# Patient Record
Sex: MALE | Race: WHITE | Employment: OTHER | URBAN - METROPOLITAN AREA
[De-identification: names, ages, dates, MRNs, and addresses within clinical notes are randomized per-mention and may not be internally consistent; named-entity substitution may affect disease eponyms.]

---

## 2017-03-27 ENCOUNTER — APPOINTMENT (OUTPATIENT)
Dept: GENERAL RADIOLOGY | Age: 77
End: 2017-03-27
Attending: EMERGENCY MEDICINE
Payer: MEDICARE

## 2017-03-27 ENCOUNTER — HOSPITAL ENCOUNTER (EMERGENCY)
Age: 77
Discharge: HOME | End: 2017-03-27
Attending: EMERGENCY MEDICINE
Payer: MEDICARE

## 2017-03-27 VITALS
WEIGHT: 174.16 LBS | SYSTOLIC BLOOD PRESSURE: 136 MMHG | BODY MASS INDEX: 27.34 KG/M2 | DIASTOLIC BLOOD PRESSURE: 79 MMHG | HEART RATE: 77 BPM | HEIGHT: 67 IN | OXYGEN SATURATION: 100 % | RESPIRATION RATE: 17 BRPM | TEMPERATURE: 99.1 F

## 2017-03-27 DIAGNOSIS — K29.90 GASTRITIS AND DUODENITIS: Primary | ICD-10-CM

## 2017-03-27 LAB
ALBUMIN SERPL BCP-MCNC: 3.6 G/DL (ref 3.5–5)
ALBUMIN/GLOB SERPL: 1.1 {RATIO} (ref 1.1–2.2)
ALP SERPL-CCNC: 48 U/L (ref 45–117)
ALT SERPL-CCNC: 20 U/L (ref 12–78)
ANION GAP BLD CALC-SCNC: 7 MMOL/L (ref 5–15)
APPEARANCE UR: CLEAR
AST SERPL W P-5'-P-CCNC: 19 U/L (ref 15–37)
BACTERIA URNS QL MICRO: NEGATIVE /HPF
BASOPHILS # BLD AUTO: 0 K/UL (ref 0–0.1)
BASOPHILS # BLD: 0 % (ref 0–1)
BILIRUB SERPL-MCNC: 2.2 MG/DL (ref 0.2–1)
BILIRUB UR QL CFM: NEGATIVE
BUN SERPL-MCNC: 21 MG/DL (ref 6–20)
BUN/CREAT SERPL: 18 (ref 12–20)
CALCIUM SERPL-MCNC: 8.7 MG/DL (ref 8.5–10.1)
CHLORIDE SERPL-SCNC: 105 MMOL/L (ref 97–108)
CO2 SERPL-SCNC: 31 MMOL/L (ref 21–32)
COLOR UR: ABNORMAL
CREAT SERPL-MCNC: 1.16 MG/DL (ref 0.7–1.3)
DIFFERENTIAL METHOD BLD: ABNORMAL
EOSINOPHIL # BLD: 0 K/UL (ref 0–0.4)
EOSINOPHIL NFR BLD: 0 % (ref 0–7)
EPITH CASTS URNS QL MICRO: ABNORMAL /LPF
ERYTHROCYTE [DISTWIDTH] IN BLOOD BY AUTOMATED COUNT: 13.5 % (ref 11.5–14.5)
GLOBULIN SER CALC-MCNC: 3.4 G/DL (ref 2–4)
GLUCOSE SERPL-MCNC: 106 MG/DL (ref 65–100)
GLUCOSE UR STRIP.AUTO-MCNC: NEGATIVE MG/DL
HCT VFR BLD AUTO: 38.7 % (ref 36.6–50.3)
HGB BLD-MCNC: 12.7 G/DL (ref 12.1–17)
HGB UR QL STRIP: ABNORMAL
HYALINE CASTS URNS QL MICRO: ABNORMAL /LPF (ref 0–5)
KETONES UR QL STRIP.AUTO: 40 MG/DL
LEUKOCYTE ESTERASE UR QL STRIP.AUTO: NEGATIVE
LIPASE SERPL-CCNC: 164 U/L (ref 73–393)
LYMPHOCYTES # BLD AUTO: 2 % (ref 12–49)
LYMPHOCYTES # BLD: 0.2 K/UL (ref 0.8–3.5)
MCH RBC QN AUTO: 31.6 PG (ref 26–34)
MCHC RBC AUTO-ENTMCNC: 32.8 G/DL (ref 30–36.5)
MCV RBC AUTO: 96.3 FL (ref 80–99)
MONOCYTES # BLD: 0.3 K/UL (ref 0–1)
MONOCYTES NFR BLD AUTO: 3 % (ref 5–13)
NEUTS SEG # BLD: 10.3 K/UL (ref 1.8–8)
NEUTS SEG NFR BLD AUTO: 95 % (ref 32–75)
NITRITE UR QL STRIP.AUTO: NEGATIVE
PH UR STRIP: 5 [PH] (ref 5–8)
PLATELET # BLD AUTO: 118 K/UL (ref 150–400)
POTASSIUM SERPL-SCNC: 3.7 MMOL/L (ref 3.5–5.1)
PROT SERPL-MCNC: 7 G/DL (ref 6.4–8.2)
PROT UR STRIP-MCNC: ABNORMAL MG/DL
RBC # BLD AUTO: 4.02 M/UL (ref 4.1–5.7)
RBC #/AREA URNS HPF: ABNORMAL /HPF (ref 0–5)
RBC MORPH BLD: ABNORMAL
SODIUM SERPL-SCNC: 143 MMOL/L (ref 136–145)
SP GR UR REFRACTOMETRY: 1.03 (ref 1–1.03)
UA: UC IF INDICATED,UAUC: ABNORMAL
UROBILINOGEN UR QL STRIP.AUTO: 0.2 EU/DL (ref 0.2–1)
WBC # BLD AUTO: 10.8 K/UL (ref 4.1–11.1)
WBC URNS QL MICRO: ABNORMAL /HPF (ref 0–4)

## 2017-03-27 PROCEDURE — 99283 EMERGENCY DEPT VISIT LOW MDM: CPT

## 2017-03-27 PROCEDURE — 81001 URINALYSIS AUTO W/SCOPE: CPT | Performed by: EMERGENCY MEDICINE

## 2017-03-27 PROCEDURE — 74011250636 HC RX REV CODE- 250/636: Performed by: EMERGENCY MEDICINE

## 2017-03-27 PROCEDURE — 83690 ASSAY OF LIPASE: CPT | Performed by: EMERGENCY MEDICINE

## 2017-03-27 PROCEDURE — 36415 COLL VENOUS BLD VENIPUNCTURE: CPT | Performed by: EMERGENCY MEDICINE

## 2017-03-27 PROCEDURE — 85025 COMPLETE CBC W/AUTO DIFF WBC: CPT | Performed by: EMERGENCY MEDICINE

## 2017-03-27 PROCEDURE — 80053 COMPREHEN METABOLIC PANEL: CPT | Performed by: EMERGENCY MEDICINE

## 2017-03-27 PROCEDURE — 74022 RADEX COMPL AQT ABD SERIES: CPT

## 2017-03-27 PROCEDURE — 96360 HYDRATION IV INFUSION INIT: CPT

## 2017-03-27 RX ORDER — POLYETHYLENE GLYCOL 3350 17 G/17G
17 POWDER, FOR SOLUTION ORAL DAILY
Status: DISCONTINUED | OUTPATIENT
Start: 2017-03-28 | End: 2017-03-27 | Stop reason: HOSPADM

## 2017-03-27 RX ORDER — ONDANSETRON 4 MG/1
4 TABLET, ORALLY DISINTEGRATING ORAL
Qty: 10 TAB | Refills: 0 | Status: SHIPPED | OUTPATIENT
Start: 2017-03-27 | End: 2020-01-16 | Stop reason: ALTCHOICE

## 2017-03-27 RX ADMIN — SODIUM CHLORIDE 500 ML: 900 INJECTION, SOLUTION INTRAVENOUS at 19:38

## 2017-03-27 NOTE — DISCHARGE INSTRUCTIONS
Gastritis: Care Instructions  Your Care Instructions    Gastritis is a sore and upset stomach. It happens when something irritates the stomach lining. Many things can cause it. These include an infection such as the flu or something you ate or drank. Medicines or a sore on the lining of the stomach (ulcer) also can cause it. Your belly may bloat and ache. You may belch, vomit, and feel sick to your stomach. You should be able to relieve the problem by taking medicine. And it may help to change your diet. If gastritis lasts, your doctor may prescribe medicine. Follow-up care is a key part of your treatment and safety. Be sure to make and go to all appointments, and call your doctor if you are having problems. It's also a good idea to know your test results and keep a list of the medicines you take. How can you care for yourself at home? · If your doctor prescribed antibiotics, take them as directed. Do not stop taking them just because you feel better. You need to take the full course of antibiotics. · Be safe with medicines. If your doctor prescribed medicine to decrease stomach acid, take it as directed. Call your doctor if you think you are having a problem with your medicine. · Do not take any other medicine, including over-the-counter pain relievers, without talking to your doctor first.  · If your doctor recommends over-the-counter medicine to reduce stomach acid, such as Pepcid AC, Prilosec, Tagamet HB, or Zantac 75, follow the directions on the label. · Drink plenty of fluids (enough so that your urine is light yellow or clear like water) to prevent dehydration. Choose water and other caffeine-free clear liquids. If you have kidney, heart, or liver disease and have to limit fluids, talk with your doctor before you increase the amount of fluids you drink. · Limit how much alcohol you drink. · Avoid coffee, tea, cola drinks, chocolate, and other foods with caffeine.  They increase stomach acid.  When should you call for help? Call 911 anytime you think you may need emergency care. For example, call if:  · You vomit blood or what looks like coffee grounds. · You pass maroon or very bloody stools. Call your doctor now or seek immediate medical care if:  · You start breathing fast and have not produced urine in the last 8 hours. · You cannot keep fluids down. Watch closely for changes in your health, and be sure to contact your doctor if:  · You do not get better as expected. Where can you learn more? Go to http://julisa-davide.info/. Enter [id number] in the search box to learn more about \"Gastritis: Care Instructions. \"  Current as of: August 9, 2016  Content Version: 11.1  © 2774-9259 [employer]. Care instructions adapted under license by [employer] (which disclaims liability or warranty for this information). If you have questions about a medical condition or this instruction, always ask your healthcare professional. [address] any warranty or liability for your use of this information.

## 2017-03-27 NOTE — ED TRIAGE NOTES
Pt presents with onset of vomiting since last night. Denies fever, or cough. Abdominal pain is a burning sensation possibly related to vomiting. Pt also complains of constipation for one day. No pain when palpating abdomin.  Pt also complains of headache

## 2017-03-27 NOTE — ED NOTES
Assumed care of patient. Call bell within reach. Pt resting comfortably. Pt complains of gen abd pain (no increased pain with palpation) with nausea and vomiting started late last night. Pt has had some coke cola prior to arrival that has stayed down.

## 2017-03-27 NOTE — ED PROVIDER NOTES
HPI Comments: America Walker. is a 65 y.o. male, pmhx significant for CAD, CABG, HTN, and high cholesterol, who presents ambulatory to the ED c/o \"burning\" diffuse abdominal discomfort, worse in the epigastric region with vomiting since last night. Pt additionally endorses concern of being constipated, reporting that he hasn't had a \"good\" bowel movement in 3 days. Pt states that he's been taking Dulcolax with no alleviation to his symptoms. Pt states that he had a small bowel movement earlier today, which is similar to the bowel movements he's been having recently. Pt has had a colonoscopy in the past which was normal. Pt denies any prior hospitalizations for GI issues and also denies h/o abdominal surgeries. He specifically denies any blood or mucous in his stool, fevers, chills, chest pain, shortness of breath, headache, rash, diarrhea, sweating or weight loss. PCP: Christian Banks MD    PSHx: Significant for CABG, cardiac bypass  Social Hx: -tobacco, +EtOH (occasionally), -Illicit Drugs    There are no other complaints, changes, or physical findings at this time. The history is provided by the patient. No  was used.         Past Medical History:   Diagnosis Date    Acute bronchitis 3/16/2016    Back pain, chronic 11/7/2014    Breast lump on left side at 11 o'clock position 5/7/2015    CAD (coronary artery disease)     Cat scratch of right forearm with infection 6/30/2016    Chronic bronchitis ([address] 4/5/2016    H/O asbestos exposure 4/5/2016    H/O asbestos exposure 4/5/2016    Hypercholesterolemia     Hypertension     Need for diphtheria-tetanus-pertussis (Tdap) vaccine, adult/adolescent 4/4/2014    Need for pneumococcal vaccination 4/4/2014    Needs flu shot 4/4/2014    S/P CABG x 3     2001    Swelling of right hand 7/7/2016    T12 compression fracture ([address] 11/10/2014       Past Surgical History:   Procedure Laterality Date    CARDIAC SURG PROCEDURE UNLIST CABG    CARDIAC SURG PROCEDURE UNLIST  2000    bypass         History reviewed. No pertinent family history. Social History     Social History    Marital status:      Spouse name: N/A    Number of children: N/A    Years of education: N/A     Occupational History    Not on file. Social History Main Topics    Smoking status: Never Smoker    Smokeless tobacco: Never Used    Alcohol use 0.0 oz/week     0 Standard drinks or equivalent per week      Comment: occasionally    Drug use: No    Sexual activity: Not on file     Other Topics Concern    Not on file     Social History Narrative         ALLERGIES: Ace inhibitors    Review of Systems   Constitutional: Negative. Negative for activity change, appetite change, chills, fatigue, fever and unexpected weight change. HENT: Negative. Negative for congestion, hearing loss, rhinorrhea, sneezing and voice change. Eyes: Negative. Negative for pain and visual disturbance. Respiratory: Negative. Negative for apnea, cough, choking, chest tightness and shortness of breath. Cardiovascular: Negative. Negative for chest pain and palpitations. Gastrointestinal: Positive for abdominal pain, constipation, nausea and vomiting. Negative for abdominal distention, blood in stool and diarrhea. Genitourinary: Negative. Negative for difficulty urinating, flank pain, frequency and urgency. No discharge   Musculoskeletal: Negative. Negative for arthralgias, back pain, myalgias and neck stiffness. Skin: Negative. Negative for color change and rash. Neurological: Negative. Negative for dizziness, seizures, syncope, speech difficulty, weakness, numbness and headaches. Hematological: Negative for adenopathy. Psychiatric/Behavioral: Negative. Negative for agitation, behavioral problems, dysphoric mood and suicidal ideas. The patient is not nervous/anxious. Physical Exam   Constitutional: He is oriented to person, place, and time.  He appears well-developed and well-nourished. No distress. Non-toxic in appearance   HENT:   Head: Normocephalic and atraumatic. Mouth/Throat: Oropharynx is clear and moist. No oropharyngeal exudate. Eyes: Conjunctivae and EOM are normal. Pupils are equal, round, and reactive to light. Right eye exhibits no discharge. Left eye exhibits no discharge. Neck: Normal range of motion. Neck supple. Cardiovascular: Normal rate, regular rhythm and intact distal pulses. Exam reveals no gallop and no friction rub. No murmur heard. Pulmonary/Chest: Effort normal and breath sounds normal. No respiratory distress. He has no wheezes. He has no rales. He exhibits no tenderness. Abdominal: Soft. Bowel sounds are normal. He exhibits no distension and no mass. There is no tenderness. There is no rebound and no guarding. Musculoskeletal: Normal range of motion. He exhibits no edema. Lymphadenopathy:     He has no cervical adenopathy. Neurological: He is alert and oriented to person, place, and time. No cranial nerve deficit. Coordination normal.   Skin: Skin is warm and dry. No rash noted. No erythema. Psychiatric: He has a normal mood and affect. Nursing note and vitals reviewed.        MDM  Number of Diagnoses or Management Options  Diagnosis management comments:   DDx: small bowel obstruction, constipation, colitis, GE, abdominal aneurysm, mesenteric ischemia     Will treat with CBS, CMP, lipase, UA, abdominal CT and Miralax        Amount and/or Complexity of Data Reviewed  Clinical lab tests: reviewed and ordered  Tests in the radiology section of CPT®: reviewed and ordered  Review and summarize past medical records: yes    Patient Progress  Patient progress: stable    ED Course       Procedures    Chief Complaint   Patient presents with    Vomiting     onset last night    Abdominal Pain     burning abd pain    Constipation       7:50 PM  The patients presenting problems have been discussed, and they are in agreement with the care plan formulated and outlined with them. I have encouraged them to ask questions as they arise throughout their visit. MEDICATIONS GIVEN:  Medications   polyethylene glycol (MIRALAX) packet 17 g (not administered)   sodium chloride 0.9 % bolus infusion 500 mL (500 mL IntraVENous New Bag 3/27/17 1938)       LABS REVIEWED:  Recent Results (from the past 24 hour(s))   URINALYSIS W/ REFLEX CULTURE    Collection Time: 03/27/17  4:07 PM   Result Value Ref Range    Color YELLOW/STRAW      Appearance CLEAR CLEAR      Specific gravity 1.027 1.003 - 1.030      pH (UA) 5.0 5.0 - 8.0      Protein TRACE (A) NEG mg/dL    Glucose NEGATIVE  NEG mg/dL    Ketone 40 (A) NEG mg/dL    Blood MODERATE (A) NEG      Urobilinogen 0.2 0.2 - 1.0 EU/dL    Nitrites NEGATIVE  NEG      Leukocyte Esterase NEGATIVE  NEG      WBC 0-4 0 - 4 /hpf    RBC 5-10 0 - 5 /hpf    Epithelial cells FEW FEW /lpf    Bacteria NEGATIVE  NEG /hpf    UA:UC IF INDICATED CULTURE NOT INDICATED BY UA RESULT CNI      Hyaline cast 0-2 0 - 5 /lpf   BILIRUBIN, CONFIRM    Collection Time: 03/27/17  4:07 PM   Result Value Ref Range    Bilirubin UA, confirm NEGATIVE  NEG     CBC WITH AUTOMATED DIFF    Collection Time: 03/27/17  7:10 PM   Result Value Ref Range    WBC 10.8 4.1 - 11.1 K/uL    RBC 4.02 (L) 4.10 - 5.70 M/uL    HGB 12.7 12.1 - 17.0 g/dL    HCT 38.7 36.6 - 50.3 %    MCV 96.3 80.0 - 99.0 FL    MCH 31.6 26.0 - 34.0 PG    MCHC 32.8 30.0 - 36.5 g/dL    RDW 13.5 11.5 - 14.5 %    PLATELET 032 (L) 072 - 400 K/uL    NEUTROPHILS 95 (H) 32 - 75 %    LYMPHOCYTES 2 (L) 12 - 49 %    MONOCYTES 3 (L) 5 - 13 %    EOSINOPHILS 0 0 - 7 %    BASOPHILS 0 0 - 1 %    ABS. NEUTROPHILS 10.3 (H) 1.8 - 8.0 K/UL    ABS. LYMPHOCYTES 0.2 (L) 0.8 - 3.5 K/UL    ABS. MONOCYTES 0.3 0.0 - 1.0 K/UL    ABS. EOSINOPHILS 0.0 0.0 - 0.4 K/UL    ABS.  BASOPHILS 0.0 0.0 - 0.1 K/UL    DF SMEAR SCANNED      RBC COMMENTS NORMOCYTIC, NORMOCHROMIC     METABOLIC PANEL, COMPREHENSIVE Collection Time: 03/27/17  7:10 PM   Result Value Ref Range    Sodium 143 136 - 145 mmol/L    Potassium 3.7 3.5 - 5.1 mmol/L    Chloride 105 97 - 108 mmol/L    CO2 31 21 - 32 mmol/L    Anion gap 7 5 - 15 mmol/L    Glucose 106 (H) 65 - 100 mg/dL    BUN 21 (H) 6 - 20 MG/DL    Creatinine 1.16 0.70 - 1.30 MG/DL    BUN/Creatinine ratio 18 12 - 20      GFR est AA >60 >60 ml/min/1.73m2    GFR est non-AA >60 >60 ml/min/1.73m2    Calcium 8.7 8.5 - 10.1 MG/DL    Bilirubin, total 2.2 (H) 0.2 - 1.0 MG/DL    ALT (SGPT) 20 12 - 78 U/L    AST (SGOT) 19 15 - 37 U/L    Alk. phosphatase 48 45 - 117 U/L    Protein, total 7.0 6.4 - 8.2 g/dL    Albumin 3.6 3.5 - 5.0 g/dL    Globulin 3.4 2.0 - 4.0 g/dL    A-G Ratio 1.1 1.1 - 2.2     LIPASE    Collection Time: 03/27/17  7:10 PM   Result Value Ref Range    Lipase 164 73 - 393 U/L       VITAL SIGNS:  Patient Vitals for the past 12 hrs:   Temp Pulse Resp BP SpO2   03/27/17 1807 99.1 °F (37.3 °C) 77 17 136/79 100 %   03/27/17 1601 98.2 °F (36.8 °C) 100 18 153/67 99 %       RADIOLOGY RESULTS:  The following have been ordered and reviewed:  XR ABD ACUTE W 1 V CHEST   Final Result     EXAM: XR ABD ACUTE W 1 V CHEST     INDICATION: Onset of vomiting since last night. Has abdominal pain with burning  sensation possibly related to vomiting. One day history of constipation.     COMPARISON: April 5, 2016 chest x-ray.     FINDINGS: The upright chest radiograph demonstrates clear lungs and pleural  margins. Bilateral basilar calcified pleural plaques are noted consistent with  asbestos exposure, unchanged. Cardiac, mediastinal and hilar contours are  normal. Median sternotomy wires are shown.     Supine and upright views of the abdomen demonstrate a nonobstructive bowel gas  pattern. There is no free intraperitoneal air. No soft tissue masses or  pathologic calcifications are identified. Mild lumbar spine degenerative changes  are shown. .     IMPRESSION  IMPRESSION: No evidence of bowel obstruction or free peritoneal air. Calcified  pleural plaques. PROGRESS NOTES:    7:50 PM  I have just reevaluated the patient. I have reviewed His vital signs and determined there is currently no worsening in their condition or physical exam.  Results have been reviewed with them and their questions have been answered. We will continue to review further results as they come available. DIAGNOSIS:    1. Gastritis and duodenitis        PLAN:  Follow-up Information     Follow up With Details Comments Contact Info    Mukul Delvalle MD Call in 2 days  [address] [place] [address]  [phone]          Current Discharge Medication List      START taking these medications    Details   ondansetron (ZOFRAN ODT) 4 mg disintegrating tablet Take 1 Tab by mouth every eight (8) hours as needed for Nausea. Qty: 10 Tab, Refills: 0         CONTINUE these medications which have NOT CHANGED    Details   pravastatin (PRAVACHOL) 40 mg tablet TAKE 1 TABLET EVERY NIGHT  Qty: 90 Tab, Refills: 3      cholecalciferol (VITAMIN D3) 1,000 unit tablet Take 1 Tab by mouth daily. Qty: 30 Tab, Refills: 11    Associated Diagnoses: Hyperbilirubinemia; Prediabetes; Essential hypertension with goal blood pressure less than 130/80      vitamin e (E GEMS) 1,000 unit capsule Take 1,000 Units by mouth daily. aspirin 81 mg tablet Take 81 mg by mouth. MULTIVITAMIN (MULTIPLE VITAMIN PO) Take 1 Tab by mouth daily. ED COURSE: The patients hospital course has been uncomplicated. Attestation: This note is prepared by Yaron Hernandez, acting as Scribe for [employer] Batsheva Barrow, [address] Batsheva Barrow MD: The scribe's documentation has been prepared under my direction and personally reviewed by me in its entirety. I confirm that the note above accurately reflects all work, treatment, procedures, and medical decision making performed by me.

## 2017-03-28 NOTE — ED NOTES
Discharge instruction reviewed by becky.  Pt verbalized understanding of dc inst and ambulated out on own after being medicated.

## 2017-04-17 ENCOUNTER — OFFICE VISIT (OUTPATIENT)
Dept: FAMILY MEDICINE CLINIC | Age: 77
End: 2017-04-17

## 2017-04-17 VITALS
HEIGHT: 67 IN | TEMPERATURE: 96.7 F | HEART RATE: 60 BPM | SYSTOLIC BLOOD PRESSURE: 126 MMHG | WEIGHT: 172.6 LBS | DIASTOLIC BLOOD PRESSURE: 61 MMHG | BODY MASS INDEX: 27.09 KG/M2 | OXYGEN SATURATION: 99 % | RESPIRATION RATE: 14 BRPM

## 2017-04-17 DIAGNOSIS — R82.4 KETONURIA: ICD-10-CM

## 2017-04-17 DIAGNOSIS — K21.9 GASTROESOPHAGEAL REFLUX DISEASE WITHOUT ESOPHAGITIS: ICD-10-CM

## 2017-04-17 DIAGNOSIS — Z13.1 ENCOUNTER FOR SCREENING EXAMINATION FOR IMPAIRED GLUCOSE REGULATION AND DIABETES MELLITUS: ICD-10-CM

## 2017-04-17 DIAGNOSIS — Z77.090 H/O ASBESTOS EXPOSURE: ICD-10-CM

## 2017-04-17 DIAGNOSIS — Z23 ENCOUNTER FOR IMMUNIZATION: ICD-10-CM

## 2017-04-17 DIAGNOSIS — K59.04 CHRONIC IDIOPATHIC CONSTIPATION: Primary | ICD-10-CM

## 2017-04-17 NOTE — PROGRESS NOTES
HISTORY OF PRESENT ILLNESS  Karie Brenner Sr. is a 65 y.o. male. HPI present with a complaint of chronic constipation stating that he also felt nauseated with vomiting for which he went to emergency room he was checked with abdominal x-ray normal result blood work was also normal except for ketone urea abnormality patient states that he does not drink any water throughout the day but he is also very active and his main drink is mostly so dollars in addition he states that he had colonoscopy less than 10 years ago and he denies any abdominal pain at this time    His Constipation with Hard stool, no fhx of colon cancer, no tarry stool, no abdominal pain, eats varieties of foods,  compliant with stool softner not working, was also told to have gastritis, and was given Zofran, no nausea and vomiting since the ER visit,    pt tates that  does not gets any heart burn, in addition, states that the discomfort worsen by fatty/spicy  Foods,  patient has any hematchezia or hematemesis, pt was not ever checked for Helicobacter pylori infection in the past    Current Outpatient Prescriptions   Medication Sig Dispense Refill    ondansetron (ZOFRAN ODT) 4 mg disintegrating tablet Take 1 Tab by mouth every eight (8) hours as needed for Nausea. 10 Tab 0    losartan-hydroCHLOROthiazide (HYZAAR) 100-25 mg per tablet Take 1 Tab by mouth daily. Indications: Hypertension 90 Tab 3    ibuprofen (ADVIL) 200 mg tablet Take 3 Tabs by mouth every eight (8) hours as needed for Pain. 40 Tab 0    pravastatin (PRAVACHOL) 40 mg tablet TAKE 1 TABLET EVERY NIGHT 90 Tab 3    cholecalciferol (VITAMIN D3) 1,000 unit tablet Take 1 Tab by mouth daily. 30 Tab 11    vitamin e (E GEMS) 1,000 unit capsule Take 1,000 Units by mouth daily.  aspirin 81 mg tablet Take 81 mg by mouth.  MULTIVITAMIN (MULTIPLE VITAMIN PO) Take 1 Tab by mouth daily.        Allergies   Allergen Reactions    Ace Inhibitors Cough     Past Medical History:   Diagnosis Date    Acute bronchitis 3/16/2016    Back pain, chronic 11/7/2014    Breast lump on left side at 11 o'clock position 5/7/2015    CAD (coronary artery disease)     Cat scratch of right forearm with infection 6/30/2016    Chronic bronchitis ([facility] [address] 4/5/2016    H/O asbestos exposure 4/5/2016    H/O asbestos exposure 4/5/2016    Hypercholesterolemia     Hypertension     Need for diphtheria-tetanus-pertussis (Tdap) vaccine, adult/adolescent 4/4/2014    Need for pneumococcal vaccination 4/4/2014    Needs flu shot 4/4/2014    S/P CABG x 3     2001    Swelling of right hand 7/7/2016    T12 compression fracture ([facility] [address] 11/10/2014     Past Surgical History:   Procedure Laterality Date    CARDIAC SURG PROCEDURE UNLIST      CABG    CARDIAC SURG PROCEDURE UNLIST  2000    bypass     History reviewed. No pertinent family history.   Social History   Substance Use Topics    Smoking status: Never Smoker    Smokeless tobacco: Never Used    Alcohol use 0.0 oz/week     0 Standard drinks or equivalent per week      Comment: occasionally      Lab Results  Component Value Date/Time   WBC 10.8 03/27/2017 07:10 PM   HGB 12.7 03/27/2017 07:10 PM   HCT 38.7 03/27/2017 07:10 PM   PLATELET 816 76/71/4854 07:10 PM   MCV 96.3 03/27/2017 07:10 PM       Lab Results  Component Value Date/Time   Hemoglobin A1c 5.7 11/07/2014 12:59 PM   Hemoglobin A1c 5.7 04/04/2014 10:48 AM   Hemoglobin A1c 5.7 09/06/2013 11:03 AM   Glucose 106 03/27/2017 07:10 PM   Glucose POC 93mg/dl 01/14/2013 02:35 PM   LDL, calculated 108 11/15/2016 08:59 AM   Creatinine (POC) 1.2 04/18/2016 09:02 AM   Creatinine 1.16 03/27/2017 07:10 PM      Lab Results  Component Value Date/Time   Cholesterol, total 199 11/15/2016 08:59 AM   HDL Cholesterol 74 11/15/2016 08:59 AM   LDL, calculated 108 11/15/2016 08:59 AM   Triglyceride 86 11/15/2016 08:59 AM       Lab Results  Component Value Date/Time   ALT (SGPT) 20 03/27/2017 07:10 PM   AST (SGOT) 19 03/27/2017 07:10 PM Alk. phosphatase 48 03/27/2017 07:10 PM   Bilirubin, direct 0.37 01/30/2013 11:16 AM   Bilirubin, total 2.2 03/27/2017 07:10 PM       Lab Results  Component Value Date/Time   GFR est AA >60 03/27/2017 07:10 PM   GFR est non-AA >60 03/27/2017 07:10 PM   Creatinine (POC) 1.2 04/18/2016 09:02 AM   Creatinine 1.16 03/27/2017 07:10 PM   BUN 21 03/27/2017 07:10 PM   Sodium 143 03/27/2017 07:10 PM   Potassium 3.7 03/27/2017 07:10 PM   Chloride 105 03/27/2017 07:10 PM   CO2 31 03/27/2017 07:10 PM         Review of Systems   Constitutional: Negative for chills and fever. HENT: Negative for ear pain and nosebleeds. Eyes: Negative for blurred vision, pain and discharge. Respiratory: Negative for shortness of breath. Cardiovascular: Negative for chest pain and leg swelling. Gastrointestinal: Positive for constipation. Negative for diarrhea, nausea and vomiting. Genitourinary: Negative for frequency. Musculoskeletal: Negative for joint pain. Skin: Negative for itching and rash. Neurological: Negative for headaches. Psychiatric/Behavioral: Negative for depression. The patient is not nervous/anxious. Physical Exam   Constitutional: He is oriented to person, place, and time. He appears well-developed and well-nourished. HENT:   Head: Normocephalic and atraumatic. Mouth/Throat: No oropharyngeal exudate. Eyes: Conjunctivae and EOM are normal.   Neck: Normal range of motion. Neck supple. Cardiovascular: Normal rate, regular rhythm and normal heart sounds. No murmur heard. Pulmonary/Chest: Effort normal and breath sounds normal. No respiratory distress. Abdominal: Soft. Bowel sounds are normal. He exhibits no distension. There is no rebound. Musculoskeletal: He exhibits no edema or tenderness. Neurological: He is alert and oriented to person, place, and time. Skin: Skin is warm. No erythema. Psychiatric: He has a normal mood and affect.  His behavior is normal.   Nursing note and vitals reviewed. ASSESSMENT and Riesa Meckel was seen today for constipation. Diagnoses and all orders for this visit:    Chronic idiopathic constipation  -     H. PYLORI ABS, IGG, IGA, IGM  -     OCCULT BLOOD, IMMUNOASSAY (FIT)  -     psyllium (METAMUCIL) powd; One cup w/ 12 oz of water twice daily and hold for loose stool  -     MN COLLECTION VENOUS BLOOD,VENIPUNCTURE    Gastroesophageal reflux disease without esophagitis  -     H. PYLORI ABS, IGG, IGA, IGM  -     OCCULT BLOOD, IMMUNOASSAY (FIT)  -     psyllium (METAMUCIL) powd; One cup w/ 12 oz of water twice daily and hold for loose stool  -     MN COLLECTION VENOUS BLOOD,VENIPUNCTURE    Ketonuria  -     MN COLLECTION VENOUS BLOOD,VENIPUNCTURE    Encounter for screening examination for impaired glucose regulation and diabetes mellitus  -     REFERRAL TO OPTOMETRY  -     MN COLLECTION VENOUS BLOOD,VENIPUNCTURE    Encounter for immunization  -     varicella zoster vacine live (ZOSTAVAX) 19,400 unit/0.65 mL susr injection; 1 Vial by SubCUTAneous route once for 1 dose.   -     MN COLLECTION VENOUS BLOOD,VENIPUNCTURE    H/O asbestos exposure  -     REFERRAL TO PULMONARY DISEASE  -     MN COLLECTION VENOUS BLOOD,VENIPUNCTURE    Increase physical acitivity, stool softner such as colace 100 mg one tab tid w/ meals, metamucil powder one cup twice daily, avoid fatty, fast and fried foods, donot miss meals, small meals and more frequent avoid late dinner avoid overeating, increase po fluid intake daily, compliance advised RTC if worsen  Pt agreed with recommendations

## 2017-04-17 NOTE — MR AVS SNAPSHOT
Visit Information Date & Time Provider Department Dept. Phone Encounter #  
 4/17/2017  9:15 AM MD Moreno Turner [facility] [phone] [id number] Follow-up Instructions Return in about 4 months (around 8/17/2017), or if symptoms worsen or fail to improve. Your Appointments 6/1/2017  8:45 AM  
ROUTINE CARE with MD Moreno Turner [facility] ([facility]) Appt Note: 6 mo f/u  
 [address] [facility] [place] [address] [id number] [phone] [address] [facility] [id number] Upcoming Health Maintenance Date Due ZOSTER VACCINE AGE 60> 3/15/1999 GLAUCOMA SCREENING Q2Y 2/28/2016 Pneumococcal 65+ Low/Medium Risk (2 of 2 - PPSV23) 5/25/2017 MEDICARE YEARLY EXAM 5/26/2017 COLONOSCOPY 4/22/2024 DTaP/Tdap/Td series (2 - Td) 9/15/2025 Allergies as of 4/17/2017  Review Complete On: 3/27/2017 By: Addi Tomlin Severity Noted Reaction Type Reactions Ace Inhibitors  04/25/2016    Cough Current Immunizations  Reviewed on 5/25/2016 Name Date Tdap 9/15/2015  8:56 PM  
  
 Not reviewed this visit You Were Diagnosed With   
  
 Codes Comments Chronic idiopathic constipation    -  Primary ICD-10-CM: K59.04 
ICD-9-CM: 564.00 Gastroesophageal reflux disease without esophagitis     ICD-10-CM: K21.9 ICD-9-CM: 530.81 Ketonuria     ICD-10-CM: R82.4 ICD-9-CM: 791.6 Encounter for screening examination for impaired glucose regulation and diabetes mellitus     ICD-10-CM: Z13.1 ICD-9-CM: V77.1 Encounter for immunization     ICD-10-CM: H45 ICD-9-CM: V03.89   
 H/O asbestos exposure     ICD-10-CM: Z77.090 
ICD-9-CM: V15.84 Vitals BP Pulse Temp Resp Height(growth percentile) Weight(growth percentile)  126/61 (BP 1 Location: Left arm, BP Patient Position: At rest) 60 96.7 °F (35.9 °C) (Oral) 14 5' 7\" (1.702 m) 172 lb 9.6 oz (78.3 kg) SpO2 BMI Smoking Status 99% 27.03 kg/m2 Never Smoker BMI and BSA Data Body Mass Index Body Surface Area  
 27.03 kg/m 2 1.92 m 2 Preferred Pharmacy Pharmacy Name Phone [facility] [place] - [address] [phone] Your Updated Medication List  
  
   
This list is accurate as of: 17 10:01 AM.  Always use your most recent med list.  
  
  
  
  
 aspirin 81 mg tablet Take 81 mg by mouth. cholecalciferol 1,000 unit tablet Commonly known as:  VITAMIN D3 Take 1 Tab by mouth daily. ibuprofen 200 mg tablet Commonly known as:  ADVIL Take 3 Tabs by mouth every eight (8) hours as needed for Pain.  
  
 losartan-hydroCHLOROthiazide 100-25 mg per tablet Commonly known as:  HYZAAR Take 1 Tab by mouth daily. Indications: Hypertension MULTIPLE VITAMIN PO Take 1 Tab by mouth daily. ondansetron 4 mg disintegrating tablet Commonly known as:  ZOFRAN ODT Take 1 Tab by mouth every eight (8) hours as needed for Nausea. pravastatin 40 mg tablet Commonly known as:  PRAVACHOL  
TAKE 1 TABLET EVERY NIGHT  
  
 psyllium Powd Commonly known as:  METAMUCIL One cup w/ 12 oz of water twice daily and hold for loose stool  
  
 varicella zoster vacine live 19,400 unit/0.65 mL Susr injection Commonly known as:  ZOSTAVAX  
1 Vial by SubCUTAneous route once for 1 dose. vitamin e 1,000 unit capsule Commonly known as:  E GEMS Take 1,000 Units by mouth daily. Prescriptions Printed Refills  
 varicella zoster vacine live (ZOSTAVAX) 19,400 unit/0.65 mL susr injection 0 Si Vial by SubCUTAneous route once for 1 dose. Class: Print Route: SubCUTAneous Prescriptions Sent to Pharmacy Refills  
 psyllium (METAMUCIL) powd 6 Sig: One cup w/ 12 oz of water twice daily and hold for loose stool Class: Normal  
 Pharmacy: [address] [facility], [address]  #: [phone] We Performed the Following H. PYLORI ABS, IGG, IGA, IGM [id number] CPT(R)] OCCULT BLOOD, IMMUNOASSAY (FIT) [id number] CPT(R)] REFERRAL TO OPTOMETRY [id number] Custom] Comments:  
 Please evaluate patient for glaucoma. REFERRAL TO PULMONARY DISEASE [TBI16 Custom] Comments:  
 Please evaluate patient for exposure to asbestos with abnl CT results Dr Nory England Pulmunologist  
  
Follow-up Instructions Return in about 4 months (around 8/17/2017), or if symptoms worsen or fail to improve. To-Do List   
 05/03/2017 9:30 AM  
  Appointment with [facility] CT 2 at [facility] ([phone]) NON-CONTRAST STUDY: 1. Bring any non [facility] facility films/images pertaining to the area of interest with you on the day of appointment. 2. Check in at registration at least 30 minutes before appt time unless you were instructed to do otherwise. 3. If you have to drink oral contrast please pick it up any weekday prior to your appointment, if you cannot please check in 2 hrs  before appt time. Referral Information Referral ID Referred By Referred To  
  
 [id number] LIZA RAMIREZ MD   
   [address]   
   [place], [address] [place] [address] Phone: [phone] Fax: [phone] Visits Status Start Date End Date 1 New Request 4/17/17 4/17/18 If your referral has a status of pending review or denied, additional information will be sent to support the outcome of this decision. Referral ID Referred By Referred To  
 [id number] Марина Adriano Not Available Visits Status Start Date End Date 1 New Request 4/17/17 4/17/18 If your referral has a status of pending review or denied, additional information will be sent to support the outcome of this decision. Patient Instructions Constipation: Care Instructions Your Care Instructions Constipation means that you have a hard time passing stools (bowel movements). People pass stools from 3 times a day to once every 3 days. What is normal for you may be different. Constipation may occur with pain in the rectum and cramping. The pain may get worse when you try to pass stools. Sometimes there are small amounts of bright red blood on toilet paper or the surface of stools. This is because of enlarged veins near the rectum (hemorrhoids). A few changes in your diet and lifestyle may help you avoid ongoing constipation. Your doctor may also prescribe medicine to help loosen your stool. Some medicines can cause constipation. These include pain medicines and antidepressants. Tell your doctor about all the medicines you take. Your doctor may want to make a medicine change to ease your symptoms. Follow-up care is a key part of your treatment and safety. Be sure to make and go to all appointments, and call your doctor if you are having problems. It's also a good idea to know your test results and keep a list of the medicines you take. How can you care for yourself at home? · Drink plenty of fluids, enough so that your urine is light yellow or clear like water. If you have kidney, heart, or liver disease and have to limit fluids, talk with your doctor before you increase the amount of fluids you drink. · Include high-fiber foods in your diet each day. These include fruits, vegetables, beans, and whole grains. · Get at least 30 minutes of exercise on most days of the week. Walking is a good choice. You also may want to do other activities, such as running, swimming, cycling, or playing tennis or team sports. · Take a fiber supplement, such as Citrucel or Metamucil, every day. Read and follow all instructions on the label. · Schedule time each day for a bowel movement. A daily routine may help. Take your time having your bowel movement. · Support your feet with a small step stool when you sit on the toilet. This helps flex your hips and places your pelvis in a squatting position. · Your doctor may recommend an over-the-counter laxative to relieve your constipation. Examples are Milk of Magnesia and MiraLax. Read and follow all instructions on the label. Do not use laxatives on a long-term basis. When should you call for help? Call your doctor now or seek immediate medical care if: 
· You have new or worse belly pain. · You have new or worse nausea or vomiting. · You have blood in your stools. Watch closely for changes in your health, and be sure to contact your doctor if: 
· Your constipation is getting worse. · You do not get better as expected. Where can you learn more? Go to http://julisa-davide.info/. Enter [phone] in the search box to learn more about \"Constipation: Care Instructions. \" Current as of: May 27, 2016 Content Version: 11.2 © 2917-4621 [employer]. Care instructions adapted under license by [employer] (which disclaims liability or warranty for this information). If you have questions about a medical condition or this instruction, always ask your healthcare professional. [place] [address] any warranty or liability for your use of this information. Introducing [facility] [place] [facility]! Dear Joe Olivo: Thank you for requesting a [employer] account. Our records indicate that you already have an active [employer] account. You can access your account anytime at [email or web address] My Ad [email or web address]/Hol[email or web address] Did you know that you can access your hospital and ER discharge instructions at any time in [employer]? You can also review all of your test results from your hospital stay or ER visit. Additional Information If you have questions, please visit the Frequently Asked Questions section of the [employer] website at [email or web address] Ad [email or web address] Remember, [employer] is NOT to be used for urgent needs. For medical emergencies, dial 911. Now available from your iPhone and Android! Please provide this summary of care documentation to your next provider. Your primary care clinician is listed as Ksenia Collazo. If you have any questions after today's visit, please call [phone].

## 2017-04-17 NOTE — PROGRESS NOTES
[facility]        Name and  verified        Chief Complaint   Patient presents with    Constipation     X 3 weeks patient stated OTC products non effective         Health Maintenance reviewed-discussed with patient.

## 2017-04-17 NOTE — PATIENT INSTRUCTIONS
Constipation: Care Instructions  Your Care Instructions  Constipation means that you have a hard time passing stools (bowel movements). People pass stools from 3 times a day to once every 3 days. What is normal for you may be different. Constipation may occur with pain in the rectum and cramping. The pain may get worse when you try to pass stools. Sometimes there are small amounts of bright red blood on toilet paper or the surface of stools. This is because of enlarged veins near the rectum (hemorrhoids). A few changes in your diet and lifestyle may help you avoid ongoing constipation. Your doctor may also prescribe medicine to help loosen your stool. Some medicines can cause constipation. These include pain medicines and antidepressants. Tell your doctor about all the medicines you take. Your doctor may want to make a medicine change to ease your symptoms. Follow-up care is a key part of your treatment and safety. Be sure to make and go to all appointments, and call your doctor if you are having problems. It's also a good idea to know your test results and keep a list of the medicines you take. How can you care for yourself at home? · Drink plenty of fluids, enough so that your urine is light yellow or clear like water. If you have kidney, heart, or liver disease and have to limit fluids, talk with your doctor before you increase the amount of fluids you drink. · Include high-fiber foods in your diet each day. These include fruits, vegetables, beans, and whole grains. · Get at least 30 minutes of exercise on most days of the week. Walking is a good choice. You also may want to do other activities, such as running, swimming, cycling, or playing tennis or team sports. · Take a fiber supplement, such as Citrucel or Metamucil, every day. Read and follow all instructions on the label. · Schedule time each day for a bowel movement. A daily routine may help.  Take your time having your bowel movement. · Support your feet with a small step stool when you sit on the toilet. This helps flex your hips and places your pelvis in a squatting position. · Your doctor may recommend an over-the-counter laxative to relieve your constipation. Examples are Milk of Magnesia and MiraLax. Read and follow all instructions on the label. Do not use laxatives on a long-term basis. When should you call for help? Call your doctor now or seek immediate medical care if:  · You have new or worse belly pain. · You have new or worse nausea or vomiting. · You have blood in your stools. Watch closely for changes in your health, and be sure to contact your doctor if:  · Your constipation is getting worse. · You do not get better as expected. Where can you learn more? Go to http://julisa-davide.info/. Enter 21 [phone] in the search box to learn more about \"Constipation: Care Instructions. \"  Current as of: May 27, 2016  Content Version: 11.2  © 3130-7890 [employer]. Care instructions adapted under license by [employer] (which disclaims liability or warranty for this information). If you have questions about a medical condition or this instruction, always ask your healthcare professional. [address] any warranty or liability for your use of this information. Constipation: Care Instructions  Your Care Instructions  Constipation means that you have a hard time passing stools (bowel movements). People pass stools from 3 times a day to once every 3 days. What is normal for you may be different. Constipation may occur with pain in the rectum and cramping. The pain may get worse when you try to pass stools. Sometimes there are small amounts of bright red blood on toilet paper or the surface of stools. This is because of enlarged veins near the rectum (hemorrhoids). A few changes in your diet and lifestyle may help you avoid ongoing constipation.  Your doctor may also prescribe medicine to help loosen your stool. Some medicines can cause constipation. These include pain medicines and antidepressants. Tell your doctor about all the medicines you take. Your doctor may want to make a medicine change to ease your symptoms. Follow-up care is a key part of your treatment and safety. Be sure to make and go to all appointments, and call your doctor if you are having problems. It's also a good idea to know your test results and keep a list of the medicines you take. How can you care for yourself at home? · Drink plenty of fluids, enough so that your urine is light yellow or clear like water. If you have kidney, heart, or liver disease and have to limit fluids, talk with your doctor before you increase the amount of fluids you drink. · Include high-fiber foods in your diet each day. These include fruits, vegetables, beans, and whole grains. · Get at least 30 minutes of exercise on most days of the week. Walking is a good choice. You also may want to do other activities, such as running, swimming, cycling, or playing tennis or team sports. · Take a fiber supplement, such as Citrucel or Metamucil, every day. Read and follow all instructions on the label. · Schedule time each day for a bowel movement. A daily routine may help. Take your time having your bowel movement. · Support your feet with a small step stool when you sit on the toilet. This helps flex your hips and places your pelvis in a squatting position. · Your doctor may recommend an over-the-counter laxative to relieve your constipation. Examples are Milk of Magnesia and MiraLax. Read and follow all instructions on the label. Do not use laxatives on a long-term basis. When should you call for help? Call your doctor now or seek immediate medical care if:  · You have new or worse belly pain. · You have new or worse nausea or vomiting. · You have blood in your stools.   Watch closely for changes in your health, and be sure to contact your doctor if:  · Your constipation is getting worse. · You do not get better as expected. Where can you learn more? Go to http://julisa-davide.info/. Enter 21 [phone] in the search box to learn more about \"Constipation: Care Instructions. \"  Current as of: May 27, 2016  Content Version: 11.2  © 4812-6415 [employer]. Care instructions adapted under license by [employer] (which disclaims liability or warranty for this information). If you have questions about a medical condition or this instruction, always ask your healthcare professional. [place] [address] any warranty or liability for your use of this information. Gastroesophageal Reflux Disease (GERD): Care Instructions  Your Care Instructions    Gastroesophageal reflux disease (GERD) is the backward flow of stomach acid into the esophagus. The esophagus is the tube that leads from your throat to your stomach. A one-way valve prevents the stomach acid from moving up into this tube. When you have GERD, this valve does not close tightly enough. If you have mild GERD symptoms including heartburn, you may be able to control the problem with antacids or over-the-counter medicine. Changing your diet, losing weight, and making other lifestyle changes can also help reduce symptoms. Follow-up care is a key part of your treatment and safety. Be sure to make and go to all appointments, and call your doctor if you are having problems. Its also a good idea to know your test results and keep a list of the medicines you take. How can you care for yourself at home? · Take your medicines exactly as prescribed. Call your doctor if you think you are having a problem with your medicine. · Your doctor may recommend over-the-counter medicine. For mild or occasional indigestion, antacids, such as Tums, Gaviscon, Mylanta, or Maalox, may help.  Your doctor also may recommend over-the-counter acid reducers, such as Pepcid AC, Tagamet HB, Zantac 75, or Prilosec. Read and follow all instructions on the label. If you use these medicines often, talk with your doctor. · Change your eating habits. ¨ Its best to eat several small meals instead of two or three large meals. ¨ After you eat, wait 2 to 3 hours before you lie down. ¨ Chocolate, mint, and alcohol can make GERD worse. ¨ Spicy foods, foods that have a lot of acid (like tomatoes and oranges), and coffee can make GERD symptoms worse in some people. If your symptoms are worse after you eat a certain food, you may want to stop eating that food to see if your symptoms get better. · Do not smoke or chew tobacco. Smoking can make GERD worse. If you need help quitting, talk to your doctor about stop-smoking programs and medicines. These can increase your chances of quitting for good. · If you have GERD symptoms at night, raise the head of your bed 6 to 8 inches by putting the frame on blocks or placing a foam wedge under the head of your mattress. (Adding extra pillows does not work.)  · Do not wear tight clothing around your middle. · Lose weight if you need to. Losing just 5 to 10 pounds can help. When should you call for help? Call your doctor now or seek immediate medical care if:  · You have new or different belly pain. · Your stools are black and tarlike or have streaks of blood. Watch closely for changes in your health, and be sure to contact your doctor if:  · Your symptoms have not improved after 2 days. · Food seems to catch in your throat or chest.  Where can you learn more? Go to http://julisa-davide.info/. Enter O260 in the search box to learn more about \"Gastroesophageal Reflux Disease (GERD): Care Instructions. \"  Current as of: August 9, 2016  Content Version: 11.2  © 1993-0600 [employer].  Care instructions adapted under license by [employer] (which disclaims liability or warranty for this information). If you have questions about a medical condition or this instruction, always ask your healthcare professional. [address] any warranty or liability for your use of this information. Constipation: Care Instructions  Your Care Instructions  Constipation means that you have a hard time passing stools (bowel movements). People pass stools from 3 times a day to once every 3 days. What is normal for you may be different. Constipation may occur with pain in the rectum and cramping. The pain may get worse when you try to pass stools. Sometimes there are small amounts of bright red blood on toilet paper or the surface of stools. This is because of enlarged veins near the rectum (hemorrhoids). A few changes in your diet and lifestyle may help you avoid ongoing constipation. Your doctor may also prescribe medicine to help loosen your stool. Some medicines can cause constipation. These include pain medicines and antidepressants. Tell your doctor about all the medicines you take. Your doctor may want to make a medicine change to ease your symptoms. Follow-up care is a key part of your treatment and safety. Be sure to make and go to all appointments, and call your doctor if you are having problems. It's also a good idea to know your test results and keep a list of the medicines you take. How can you care for yourself at home? · Drink plenty of fluids, enough so that your urine is light yellow or clear like water. If you have kidney, heart, or liver disease and have to limit fluids, talk with your doctor before you increase the amount of fluids you drink. · Include high-fiber foods in your diet each day. These include fruits, vegetables, beans, and whole grains. · Get at least 30 minutes of exercise on most days of the week. Walking is a good choice. You also may want to do other activities, such as running, swimming, cycling, or playing tennis or team sports.   · Take a fiber supplement, such as Citrucel or Metamucil, every day. Read and follow all instructions on the label. · Schedule time each day for a bowel movement. A daily routine may help. Take your time having your bowel movement. · Support your feet with a small step stool when you sit on the toilet. This helps flex your hips and places your pelvis in a squatting position. · Your doctor may recommend an over-the-counter laxative to relieve your constipation. Examples are Milk of Magnesia and MiraLax. Read and follow all instructions on the label. Do not use laxatives on a long-term basis. When should you call for help? Call your doctor now or seek immediate medical care if:  · You have new or worse belly pain. · You have new or worse nausea or vomiting. · You have blood in your stools. Watch closely for changes in your health, and be sure to contact your doctor if:  · Your constipation is getting worse. · You do not get better as expected. Where can you learn more? Go to http://julisa-davide.info/. Enter 21 [phone] in the search box to learn more about \"Constipation: Care Instructions. \"  Current as of: May 27, 2016  Content Version: 11.2  © 6625-1036 [employer]. Care instructions adapted under license by [employer] (which disclaims liability or warranty for this information). If you have questions about a medical condition or this instruction, always ask your healthcare professional. [place] [address] any warranty or liability for your use of this information. Gastroesophageal Reflux Disease (GERD): Care Instructions  Your Care Instructions    Gastroesophageal reflux disease (GERD) is the backward flow of stomach acid into the esophagus. The esophagus is the tube that leads from your throat to your stomach. A one-way valve prevents the stomach acid from moving up into this tube. When you have GERD, this valve does not close tightly enough.   If you have mild GERD symptoms including heartburn, you may be able to control the problem with antacids or over-the-counter medicine. Changing your diet, losing weight, and making other lifestyle changes can also help reduce symptoms. Follow-up care is a key part of your treatment and safety. Be sure to make and go to all appointments, and call your doctor if you are having problems. Its also a good idea to know your test results and keep a list of the medicines you take. How can you care for yourself at home? · Take your medicines exactly as prescribed. Call your doctor if you think you are having a problem with your medicine. · Your doctor may recommend over-the-counter medicine. For mild or occasional indigestion, antacids, such as Tums, Gaviscon, Mylanta, or Maalox, may help. Your doctor also may recommend over-the-counter acid reducers, such as Pepcid AC, Tagamet HB, Zantac 75, or Prilosec. Read and follow all instructions on the label. If you use these medicines often, talk with your doctor. · Change your eating habits. ¨ Its best to eat several small meals instead of two or three large meals. ¨ After you eat, wait 2 to 3 hours before you lie down. ¨ Chocolate, mint, and alcohol can make GERD worse. ¨ Spicy foods, foods that have a lot of acid (like tomatoes and oranges), and coffee can make GERD symptoms worse in some people. If your symptoms are worse after you eat a certain food, you may want to stop eating that food to see if your symptoms get better. · Do not smoke or chew tobacco. Smoking can make GERD worse. If you need help quitting, talk to your doctor about stop-smoking programs and medicines. These can increase your chances of quitting for good. · If you have GERD symptoms at night, raise the head of your bed 6 to 8 inches by putting the frame on blocks or placing a foam wedge under the head of your mattress.  (Adding extra pillows does not work.)  · Do not wear tight clothing around your middle. · Lose weight if you need to. Losing just 5 to 10 pounds can help. When should you call for help? Call your doctor now or seek immediate medical care if:  · You have new or different belly pain. · Your stools are black and tarlike or have streaks of blood. Watch closely for changes in your health, and be sure to contact your doctor if:  · Your symptoms have not improved after 2 days. · Food seems to catch in your throat or chest.  Where can you learn more? Go to http://julisa-davide.info/. Enter S584 in the search box to learn more about \"Gastroesophageal Reflux Disease (GERD): Care Instructions. \"  Current as of: August 9, 2016  Content Version: 11.2  © 4582-0010 [employer]. Care instructions adapted under license by [employer] (which disclaims liability or warranty for this information). If you have questions about a medical condition or this instruction, always ask your healthcare professional. [place] [address] any warranty or liability for your use of this information. H. Pylori: About This Test  What is it? H. pylori tests are used to check for a Helicobacter pylori bacteria infection in the stomach and upper part of the small intestine. H. pylori can cause peptic ulcers. But most people with this type of bacteria in their digestive systems do not get ulcers. Different tests may be used to check for an H. pylori infection. · Blood antibody test. This checks to see if your body has made antibodies to fight H. pylori bacteria. · Urea breath test. It tests your breath to see if you have H. pylori bacteria in your stomach. · Stool antigen test. This test looks for substances in your feces (stool) that trigger the immune system to fight an H. pylori infection. (These substances are called H. pylori antigens.)  · Stomach biopsy. A small sample (biopsy) is taken from the lining of your stomach and small intestine.  The samples are checked for H. pylori. Why is this test done? H. pylori tests are done to:  · Find out if an infection with H. pylori bacteria may be causing an ulcer or irritation of the stomach lining (gastritis). · Find out if treatment for the infection has been successful. How can you prepare for the test?  Blood antibody test  · You do not need to do anything before you have this test.  Urea breath test, stool antigen test, or stomach biopsy  · Medicines you take may change the results of these tests. Be sure to tell your doctor about all the prescription and over-the-counter medicines you take. Your doctor may advise you to stop taking some of your medicines. Urea breath test or stomach biopsy  · You will be asked to not eat or drink anything for a certain amount of time before your breath test or stomach biopsy. Follow your doctor's instructions about how long you need to avoid eating and drinking before the test. If you are going to have a stomach biopsy, your doctor will give you instructions on how to prepare. What happens during the test?  Blood antibody test  · A health professional takes a sample of your blood. Urea breath test  A breath sample is collected when you blow into a balloon or blow bubbles into a bottle of liquid. The health professional will:  · Collect a sample of your breath before the test starts. · Give you a capsule or some water to swallow that contains tagged or radioactive material.  · Collect more samples of your breath. The samples will be tested to see if they contain material formed when H. pylori comes into contact with the tagged or radioactive material.  Stool antigen test  For this test, you may be asked to collect the stool sample at home. To collect the sample, you need to:  · Pass stool into a dry container. Either solid or liquid stools can be collected. Be careful not to get urine or toilet tissue in with the stool sample. · Replace the container cap.  Label the container with your name, your doctor's name, and the date the sample was collected. · Wash your hands well after you collect the sample. · Take the sealed container to your doctor's office or to the lab as soon as you can. Stomach biopsy  · A procedure called endoscopy is used to collect samples of tissue from the stomach and the first part of the small intestine. The tissue samples are tested in a lab to see if they contain H. pylori. Follow-up care is a key part of your treatment and safety. Be sure to make and go to all appointments, and call your doctor if you are having problems. It's also a good idea to keep a list of the medicines you take. Ask your doctor when you can expect to have your test results. Where can you learn more? Go to http://julisa-davide.info/. Enter O915 in the search box to learn more about \"H. Pylori: About This Test.\"  Current as of: November 10, 2016  Content Version: 11.2  © 8604-0736 [employer]. Care instructions adapted under license by [employer] (which disclaims liability or warranty for this information). If you have questions about a medical condition or this instruction, always ask your healthcare professional. [place] [address] any warranty or liability for your use of this information. H. Pylori Bacterial Infection: Care Instructions  Your Care Instructions    Your test shows the presence of Helicobacter pylori (H. pylori), a kind of bacterium that lives in the lining of the stomach. Many people have H. pylori in their stomachs and do not develop problems. But sometimes H. pylori causes an upset stomach or a sore (ulcer) in the stomach lining. Most stomach ulcers are caused by H. pylori. Symptoms of an ulcer include gnawing or burning pain in the belly that can last minutes or hours. Eating food or taking antacids helps relieve the pain, but the symptoms may come back after a while.  Antibiotic medicine can cure an H. pylori infection. Follow-up care is a key part of your treatment and safety. Be sure to make and go to all appointments, and call your doctor if you are having problems. Its also a good idea to know your test results and keep a list of the medicines you take. How can you care for yourself at home? · Take your antibiotics as directed. Do not stop taking them just because you feel better. You need to take the full course of antibiotics. · If your doctor prescribes other medicine, take it exactly as prescribed. Call your doctor if you think you are having a problem with your medicine. You will get more details on the specific medicine your doctor prescribes. · Eat a healthy, balanced diet. ¨ Eat smaller meals, and eat more often. Be sure to eat at least three meals a day. ¨ Avoid heavily spiced or greasy foods. ¨ Do not drink beverages that have caffeine if they bother your stomach. These include coffee, tea, and soda. · Do not smoke. Smoking slows the healing of your ulcer and can make an ulcer come back. If you need help quitting, talk to your doctor about stop-smoking programs and medicines. These can increase your chances of quitting for good. · Limit how much alcohol you drink. Alcohol can slow healing of an ulcer and can make your symptoms worse. · Wash your hands after going to the bathroom. · Avoid aspirin, ibuprofen, or other anti-inflammatory medicines, because they can irritate the stomach. If you need pain medicine, try acetaminophen (Tylenol). When should you call for help? Call 911 anytime you think you may need emergency care. For example, call if:  · You passed out (lost consciousness). · You vomit blood or what looks like coffee grounds. · You pass maroon or very bloody stools. Call your doctor now or seek immediate medical care if:  · You have severe pain in your belly, back, or shoulders. · You have new or worsening belly pain.   · You are dizzy or lightheaded, or you feel like you may faint. · Your stools are black and tarlike or have streaks of blood. Watch closely for changes in your health, and be sure to contact your doctor if:  · You have new symptoms such as weight loss, nausea, or vomiting. · You do not feel better as expected. Where can you learn more? Go to http://julisa-davide.info/. Enter D820 in the search box to learn more about \"H. Pylori Bacterial Infection: Care Instructions. \"  Current as of: August 9, 2016  Content Version: 11.2  © 3458-7980 [employer]. Care instructions adapted under license by [employer] (which disclaims liability or warranty for this information). If you have questions about a medical condition or this instruction, always ask your healthcare professional. [address] any warranty or liability for your use of this information.

## 2017-04-19 LAB
H PYLORI IGA SER-ACNC: <9 UNITS (ref 0–8.9)
H PYLORI IGG SER IA-ACNC: <0.9 U/ML (ref 0–0.8)
H PYLORI IGM SER-ACNC: <9 UNITS (ref 0–8.9)

## 2017-04-22 LAB — HEMOCCULT STL QL IA: NEGATIVE

## 2017-05-03 ENCOUNTER — HOSPITAL ENCOUNTER (OUTPATIENT)
Dept: CT IMAGING | Age: 77
Discharge: HOME | End: 2017-05-03
Attending: INTERNAL MEDICINE
Payer: MEDICARE

## 2017-05-03 DIAGNOSIS — R91.1 PULMONARY NODULE: ICD-10-CM

## 2017-05-03 PROCEDURE — 71250 CT THORAX DX C-: CPT

## 2017-06-07 RX ORDER — PRAVASTATIN SODIUM 40 MG/1
TABLET ORAL
Qty: 90 TAB | Refills: 3 | Status: SHIPPED | OUTPATIENT
Start: 2017-06-07 | End: 2018-01-03 | Stop reason: ALTCHOICE

## 2017-07-05 ENCOUNTER — OFFICE VISIT (OUTPATIENT)
Dept: FAMILY MEDICINE CLINIC | Age: 77
End: 2017-07-05

## 2017-07-05 VITALS
HEART RATE: 60 BPM | OXYGEN SATURATION: 98 % | SYSTOLIC BLOOD PRESSURE: 114 MMHG | BODY MASS INDEX: 27.12 KG/M2 | DIASTOLIC BLOOD PRESSURE: 56 MMHG | TEMPERATURE: 97 F | RESPIRATION RATE: 14 BRPM | WEIGHT: 172.8 LBS | HEIGHT: 67 IN

## 2017-07-05 DIAGNOSIS — Z00.00 ROUTINE GENERAL MEDICAL EXAMINATION AT A HEALTH CARE FACILITY: Primary | ICD-10-CM

## 2017-07-05 DIAGNOSIS — S22.080D T12 COMPRESSION FRACTURE, WITH ROUTINE HEALING, SUBSEQUENT ENCOUNTER: ICD-10-CM

## 2017-07-05 DIAGNOSIS — Z13.5 SCREENING FOR GLAUCOMA: ICD-10-CM

## 2017-07-05 DIAGNOSIS — Z12.11 SCREEN FOR COLON CANCER: ICD-10-CM

## 2017-07-05 DIAGNOSIS — M54.9 CHRONIC MIDLINE BACK PAIN, UNSPECIFIED BACK LOCATION: ICD-10-CM

## 2017-07-05 DIAGNOSIS — G89.29 CHRONIC MIDLINE BACK PAIN, UNSPECIFIED BACK LOCATION: ICD-10-CM

## 2017-07-05 DIAGNOSIS — Z23 ENCOUNTER FOR IMMUNIZATION: ICD-10-CM

## 2017-07-05 DIAGNOSIS — Z13.39 SCREENING FOR ALCOHOLISM: ICD-10-CM

## 2017-07-05 LAB
BILIRUB UR QL STRIP: NEGATIVE
GLUCOSE UR-MCNC: NEGATIVE MG/DL
KETONES P FAST UR STRIP-MCNC: NORMAL MG/DL
PH UR STRIP: 7 [PH] (ref 4.6–8)
PROT UR QL STRIP: NEGATIVE MG/DL
SP GR UR STRIP: 1.02 (ref 1–1.03)
UA UROBILINOGEN AMB POC: NORMAL (ref 0.2–1)
URINALYSIS CLARITY POC: CLEAR
URINALYSIS COLOR POC: YELLOW
URINE BLOOD POC: NEGATIVE
URINE LEUKOCYTES POC: NEGATIVE
URINE NITRITES POC: NEGATIVE

## 2017-07-05 RX ORDER — VITAMINS A AND D
CAPSULE ORAL
COMMUNITY
Start: 2017-07-01

## 2017-07-05 RX ORDER — HYDROCODONE BITARTRATE AND ACETAMINOPHEN 5; 325 MG/1; MG/1
1 TABLET ORAL
Qty: 90 TAB | Refills: 0 | Status: SHIPPED | OUTPATIENT
Start: 2017-07-05 | End: 2018-01-03 | Stop reason: ALTCHOICE

## 2017-07-05 NOTE — PROGRESS NOTES
[facility]         Name and  verified        Chief Complaint   Patient presents with    Annual Wellness Visit         Health Maintenance reviewed-discussed with patient. Advance Directives Care Planning Information given, patient acknowledged understanding.

## 2017-07-05 NOTE — PROGRESS NOTES
This is a Subsequent Medicare Annual Wellness Visit providing Personalized Prevention Plan Services (PPPS) (Performed 12 months after initial AWV and PPPS )    I have reviewed the patient's medical history in detail and updated the computerized patient record. History   Present for CPE, last Complete Physical exam was >1 yr ago , not Up todate w/ all vaccination all offered but refusing, last tetanus vaccine was in <6yrs ago . Last psa exam was normal and was in  >2 yrs ago,    last colonoscopy was normal and was  9 yrs  Ago,   No past surgical hx,  last bone dexa scan was normal and was 2 yr Ago,      No family hx of breast cancer in a male  no family hx of prostate cancer   no family hx of colon cancer, father 65yo from bile duct cancer mother 67yo from heart Dz , +++sexaully active and ++++ physically active,  compliant w/ meds, no Rf needed for today for her meds.       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 The pt has tried otc pain meds, prescribed pain meds but they did not help and not only that,  they created ++ abdominal upset from NSAIDS, this pt has tried all the other Non- Narcotic meds and none have been able to help ease down the pain, the current opioid based pain meds are the only ones when taken ease the current pain level at this time, this pt offered surgical corrections,was also told they may not get rid of the pain, so that, pt denied correctional surgery for an unknown prognosis.     pt states that the pt is not RAJAN GARG [facility] shopping aware that is indicated in the pt contract that a record is obtained from other MD's writing opioid based med for such pt and if so, would be against the contract, it would terminate the care for the opioid based med refill, UA drug screen, would be also performed, if foul finding pt would be terminated, for which the pt agreed and finally,     The intensity of the pain is at10/10 w/out med,  persist without medication, a chronic condition, dull radiating to the lower legs, not better, and, compliant with medication takes it as prescribed, keeps meds at a safe place, on stool softener, not operating  machinery while on this med. Past Medical History:   Diagnosis Date    Acute bronchitis 3/16/2016    Back pain, chronic 11/7/2014    Breast lump on left side at 11 o'clock position 5/7/2015    CAD (coronary artery disease)     Cat scratch of right forearm with infection 6/30/2016    Chronic bronchitis (HCC) 4/5/2016    Chronic idiopathic constipation 4/17/2017    Gastroesophageal reflux disease without esophagitis 4/17/2017    H/O asbestos exposure 4/5/2016    H/O asbestos exposure 4/5/2016    Hypercholesterolemia     Hypertension     Ketonuria 4/17/2017    Need for diphtheria-tetanus-pertussis (Tdap) vaccine, adult/adolescent 4/4/2014    Need for pneumococcal vaccination 4/4/2014    Needs flu shot 4/4/2014    S/P CABG x 3     2001    Swelling of right hand 7/7/2016    T12 compression fracture ([facility] [address] 11/10/2014      Past Surgical History:   Procedure Laterality Date    CARDIAC SURG PROCEDURE UNLIST      CABG    CARDIAC SURG PROCEDURE UNLIST  2000    bypass     Current Outpatient Prescriptions   Medication Sig Dispense Refill    vitamins a & d cap       pravastatin (PRAVACHOL) 40 mg tablet TAKE 1 TABLET EVERY NIGHT 90 Tab 3    losartan-hydroCHLOROthiazide (HYZAAR) 100-25 mg per tablet Take 1 Tab by mouth daily. Indications: Hypertension 90 Tab 3    ibuprofen (ADVIL) 200 mg tablet Take 3 Tabs by mouth every eight (8) hours as needed for Pain.  36 Tab 0    cholecalciferol (VITAMIN D3) 1,000 unit tablet Take 1 Tab by mouth daily. 30 Tab 11    vitamin e (E GEMS) 1,000 unit capsule Take 1,000 Units by mouth daily.  aspirin 81 mg tablet Take 81 mg by mouth.  MULTIVITAMIN (MULTIPLE VITAMIN PO) Take 1 Tab by mouth daily.  psyllium (METAMUCIL) powd One cup w/ 12 oz of water twice daily and hold for loose stool 660 g 6    ondansetron (ZOFRAN ODT) 4 mg disintegrating tablet Take 1 Tab by mouth every eight (8) hours as needed for Nausea. 10 Tab 0     Allergies   Allergen Reactions    Ace Inhibitors Cough     History reviewed. No pertinent family history.   Social History   Substance Use Topics    Smoking status: Never Smoker    Smokeless tobacco: Never Used    Alcohol use 0.0 oz/week     0 Standard drinks or equivalent per week      Comment: occasionally     Patient Active Problem List   Diagnosis Code    Coronary atherosclerosis of artery bypass graft I25.810    CAD (coronary artery disease) I25.10    S/P CABG x 3 Z95.1    Chronic ischemic heart disease I25.9    Hypercholesterolemia E78.00    Urinary frequency R35.0    HTN, goal below 130/80 I10    Macrocytic anemia D53.9    Prediabetes R73.03    Hyperbilirubinemia E80.6    Elevated PSA R97.20    Need for diphtheria-tetanus-pertussis (Tdap) vaccine, adult/adolescent Z23    Needs flu shot Z23    Need for pneumococcal vaccination Z23    Back pain, chronic M54.9, G89.29    T12 compression fracture (HCC) M48.54XA    Breast lump on left side at 11 o'clock position N63    ASHD (arteriosclerotic heart disease) I25.10    Acute bronchitis J20.9    Chronic bronchitis (HCC) J42    H/O asbestos exposure Z77.090    Cough due to ACE inhibitor R05, T46.4X5A    Cat scratch of right forearm with infection S50.811A, L08.9, W55.03XA    Swelling of right hand M79.89    Chronic idiopathic constipation K59.04    Gastroesophageal reflux disease without esophagitis K21.9    Ketonuria R82.4 Depression Risk Factor Screening:     PHQ over the last two weeks 7/5/2017   Little interest or pleasure in doing things Not at all   Feeling down, depressed or hopeless Not at all   Total Score PHQ 2 0     Alcohol Risk Factor Screening: On any occasion during the past 3 months, have you had more than 4 drinks containing alcohol? No    Do you average more than 14 drinks per week? No        Functional Ability and Level of Safety:     Hearing Loss   normal-to-mild    Activities of Daily Living   Self-care. Requires assistance with: no ADLs    Fall Risk   Fall Risk Assessment, last 12 mths 7/5/2017   Able to walk? Yes   Fall in past 12 months? No     Abuse Screen   Patient is not abused    Review of Systems     Review of Systems    Constitutional: Negative for chills and fever, not obese okay body mass index for his age. HENT: Negative for ear head pain and nosebleeds. Eyes: Negative for blurred vision, pain and discharge. Respiratory: Negative for shortness of breath, wheezing cough sore throat. Cardiovascular: Negative for chest pain and leg swelling, racing heart . Gastrointestinal: Negative for constipation, diarrhea, nausea and vomiting. Genitourinary: Negative for frequency. Musculoskeletal: ++ for multiple joints pain. Skin: Negative for itching, pimples or acne rash. Neurological: Negative for headaches. Psychiatric/Behavioral: Negative for depression has normal interest to do things and not depressed the patient is not nervous/anxious. Physical Examination     Evaluation of Cognitive Function:  Mood/affect:  neutral  Appearance: age appropriate and within normal Limits  Family member/caregiver input: wife with dementia no input      General:  Alert, cooperative, no distress, appears stated age. Head:  Normocephalic, without obvious abnormality, atraumatic. Eyes:  Conjunctivae/corneas clear. PERRL, EOMs intact.  Fundi benign   Ears:  Normal TMs and external ear canals both ears. Nose: Nares normal. Septum midline. Mucosa normal. No drainage or sinus tenderness. Throat: Lips, mucosa, and tongue normal. Teeth and gums normal.   Neck: Supple, symmetrical, trachea midline, no adenopathy, thyroid: no enlargement/tenderness/nodules, no carotid bruit and no JVD. Back:   Symmetric, no curvature. ROM normal. No CVA tenderness. Lungs:   Clear to auscultation bilaterally. Chest wall:  No tenderness or deformity. Heart:  Regular rate and rhythm, S1, S2 normal, no murmur, click, rub or gallop. Abdomen:   Soft, non-tender. Bowel sounds normal. No masses,  No organomegaly. Genitalia:  Normal male without lesion, discharge . Rectal:  Pt denies incontinence  Guaiac nl stool. Extremities: Extremities abnormal, ++traumatic, tender to touch with abnl rom of lower and upper spine,  no cyanosis or edema. Pulses: 2+ and symmetric all extremities. Skin: Skin color, texture, turgor normal. No rashes or lesions   Lymph nodes: Cervical, supraclavicular, and axillary nodes normal.   Neurologic: CNII-XII intact. Normal strength, sensation and reflexes throughout.          Patient Care Team:  Crescencio Cedeno MD as PCP - General (Family Practice)  Jose Antonio Muller MD as Physician (Cardiology)    Advice/Referrals/Counseling   Education and counseling provided:  Are appropriate based on today's review and evaluation  End-of-Life planning (with patient's consent): Initial ACP discussion, pt wants the son Arnav Combs or Jill Johnson his [id number] as SDM,  Pneumococcal Vaccuptodateine, uptodate  Influenza Vaccine,   Hepatitis B Vaccine, declined  Prostate cancer screening tests not indicated  Colorectal cancer screening tests, ordered today  Cardiovascular screening blood test, addressed  Bone mass measurement (DEXA), ordered await for approval  Screening for glaucoma, done  Diabetes screening test, done today, no hx of it          Assessment/Plan   Edson Hunting was seen today for annual wellness visit. Diagnoses and all orders for this visit:    Routine general medical examination at a health care facility  -     CBC W/O DIFF  -     AMB POC URINALYSIS DIP STICK AUTO W/O MICRO  -     TSH 3RD GENERATION  -     LIPID PANEL  -     METABOLIC PANEL, COMPREHENSIVE  -     IRON PROFILE  -     FERRITIN    Screening for glaucoma  -     REFERRAL TO OPTOMETRY    Encounter for immunization  -     varicella zoster vacine live (ZOSTAVAX) 19,400 unit/0.65 mL susr injection; 1 Vial by SubCUTAneous route once for 1 dose. Screening for alcoholism    Screen for colon cancer  -     OCCULT BLOOD, IMMUNOASSAY (FIT) ([id number])    T12 compression fracture, with routine healing, subsequent encounter    Chronic midline back pain, unspecified back location    Other orders  -     HYDROcodone-acetaminophen (NORCO) 5-325 mg per tablet; Take 1 Tab by mouth every eight (8) hours as needed for Pain. Max Daily Amount: 3 Tabs. Indications: Pain      Patient medications were refilled after signing the contract consent and no harm documentations and again was told to lessen the amount of opioid-based medication continue with weight reduction do some massage therapy chiropractor exercise therapy such as resistance banding avoid heavy lifting heavy pushing at this time include ibuprofen and Tylenol over-the-counter topical cream for  day views of concerns patient was told to take some Tums or over-the-counter PPI if abdominal upset ice therapy he therapy side effect of current opioid-based medication significantly explained in detail patient acknowledged understanding and agreed with recommendation  in addition, pt was told to avoid machinary operation and driving while on any opioid based medications that will cause dizziness, drowsiness, and sleepiness.   Dependency and tolerancy were also addressed,  meds side effects and compliancy advised,  Call or rtc if worsens, radiology results and schedule of future radiology studies reviewed with patient. Pt agreed with today's recommendations. Patient was advised to stay at a healthy weight, which would lower the risk for many problems, such as the current obesity, less chance of worsening the uncontrolled diabetic state, depressing the progress of heart disease, maintaining the target level of a stable blood pressure. Patient was also for informed regarding the recommended dosage of alcohol intake, and Not to use any of the tobacco smoke, not allowing others to smoke around the patient. A lean diet also advised in addition of avoiding fast foods, the current medications and their side effects reviewed with the patient, patient acknowledged all understanding and today the patient agreed with all the recommendations.

## 2017-07-05 NOTE — MR AVS SNAPSHOT
Visit Information Date & Time Provider Department Dept. Phone Encounter #  
 7/5/2017  9:15 AM Tova Corona MD [address] [facility] OFFICE-ANNEX [phone] [id number] Follow-up Instructions Return in about 6 months (around 1/5/2018). Upcoming Health Maintenance Date Due ZOSTER VACCINE AGE 60> 3/15/1999 GLAUCOMA SCREENING Q2Y 2/28/2016 MEDICARE YEARLY EXAM 5/26/2017 INFLUENZA AGE 9 TO ADULT 8/1/2017 COLONOSCOPY 4/22/2024 DTaP/Tdap/Td series (2 - Td) 9/15/2025 Allergies as of 7/5/2017  Review Complete On: 7/5/2017 By: Harley Estrella LPN Severity Noted Reaction Type Reactions Ace Inhibitors  04/25/2016    Cough Current Immunizations  Reviewed on 5/25/2016 Name Date Tdap 9/15/2015  8:56 PM  
  
 Not reviewed this visit You Were Diagnosed With   
  
 Codes Comments Routine general medical examination at a health care facility    -  Primary ICD-10-CM: Z00.00 ICD-9-CM: V70.0 Screening for glaucoma     ICD-10-CM: Z13.5 ICD-9-CM: V80.1 Encounter for immunization     ICD-10-CM: C49 ICD-9-CM: V03.89 Screening for alcoholism     ICD-10-CM: Z13.89 ICD-9-CM: V79.1 Screen for colon cancer     ICD-10-CM: Z12.11 ICD-9-CM: V76.51   
 T12 compression fracture, with routine healing, subsequent encounter     ICD-10-CM: M48.54XD ICD-9-CM: V54.27 Chronic midline back pain, unspecified back location     ICD-10-CM: M54.9, G89.29 ICD-9-CM: 724.5, 338.29 Vitals BP Pulse Temp Resp Height(growth percentile) Weight(growth percentile) 114/56 (BP 1 Location: Left arm, BP Patient Position: At rest) 60 97 °F (36.1 °C) (Oral) 14 5' 7\" (1.702 m) 172 lb 12.8 oz (78.4 kg) SpO2 BMI Smoking Status 98% 27.06 kg/m2 Never Smoker Vitals History BMI and BSA Data Body Mass Index Body Surface Area  
 27.06 kg/m 2 1.93 m 2 Preferred Pharmacy Pharmacy Name Phone [address] [place] [address] [place] [phone] Your Updated Medication List  
  
   
This list is accurate as of: 17 10:30 AM.  Always use your most recent med list.  
  
  
  
  
 aspirin 81 mg tablet Take 81 mg by mouth. cholecalciferol 1,000 unit tablet Commonly known as:  VITAMIN D3 Take 1 Tab by mouth daily. HYDROcodone-acetaminophen 5-325 mg per tablet Commonly known as:  Catrachita Epstein Take 1 Tab by mouth every eight (8) hours as needed for Pain. Max Daily Amount: 3 Tabs. Indications: Pain  
  
 ibuprofen 200 mg tablet Commonly known as:  ADVIL Take 3 Tabs by mouth every eight (8) hours as needed for Pain.  
  
 losartan-hydroCHLOROthiazide 100-25 mg per tablet Commonly known as:  HYZAAR Take 1 Tab by mouth daily. Indications: Hypertension MULTIPLE VITAMIN PO Take 1 Tab by mouth daily. ondansetron 4 mg disintegrating tablet Commonly known as:  ZOFRAN ODT Take 1 Tab by mouth every eight (8) hours as needed for Nausea. pravastatin 40 mg tablet Commonly known as:  PRAVACHOL  
TAKE 1 TABLET EVERY NIGHT  
  
 psyllium Powd Commonly known as:  METAMUCIL One cup w/ 12 oz of water twice daily and hold for loose stool  
  
 varicella zoster vacine live 19,400 unit/0.65 mL Susr injection Commonly known as:  ZOSTAVAX  
1 Vial by SubCUTAneous route once for 1 dose. vitamin e 1,000 unit capsule Commonly known as:  E GEMS Take 1,000 Units by mouth daily. vitamins a & d Cap Prescriptions Printed Refills  
 varicella zoster vacine live (ZOSTAVAX) 19,400 unit/0.65 mL susr injection 0 Si Vial by SubCUTAneous route once for 1 dose. Class: Print Route: SubCUTAneous HYDROcodone-acetaminophen (NORCO) 5-325 mg per tablet 0 Sig: Take 1 Tab by mouth every eight (8) hours as needed for Pain. Max Daily Amount: 3 Tabs. Indications: Pain Class: Print  Route: Oral  
  
 We Performed the Following AMB POC URINALYSIS DIP STICK AUTO W/O MICRO [87334 CPT(R)] CBC W/O DIFF [65539 CPT(R)] FERRITIN [65770 CPT(R)] IRON PROFILE [id number] CPT(R)] LIPID PANEL [38313 CPT(R)] METABOLIC PANEL, COMPREHENSIVE [64094 CPT(R)] OCCULT BLOOD, IMMUNOASSAY (FIT) [id number] CPT(R)] REFERRAL TO OPTOMETRY [id number] Custom] Comments:  
 Please evaluate patient for glaucoma. Patient will schedule TSH 3RD GENERATION [57442 CPT(R)] Follow-up Instructions Return in about 6 months (around 1/5/2018). Referral Information Referral ID Referred By Referred To  
  
 [id number] LIZA RAMIREZ MD   
   [address], [address] [place] [address] Street Phone: [phone] Fax: [phone] Visits Status Start Date End Date 1 New Request 7/5/17 7/5/18 If your referral has a status of pending review or denied, additional information will be sent to support the outcome of this decision. Patient Instructions Well Visit, Over 72: Care Instructions Your Care Instructions Physical exams can help you stay healthy. Your doctor has checked your overall health and may have suggested ways to take good care of yourself. He or she also may have recommended tests. At home, you can help prevent illness with healthy eating, regular exercise, and other steps. Follow-up care is a key part of your treatment and safety. Be sure to make and go to all appointments, and call your doctor if you are having problems. It's also a good idea to know your test results and keep a list of the medicines you take. How can you care for yourself at home? · Reach and stay at a healthy weight. This will lower your risk for many problems, such as obesity, diabetes, heart disease, and high blood pressure. · Get at least 30 minutes of exercise on most days of the week. Walking is a good choice.  You also may want to do other activities, such as running, swimming, cycling, or playing tennis or team sports. · Do not smoke. Smoking can make health problems worse. If you need help quitting, talk to your doctor about stop-smoking programs and medicines. These can increase your chances of quitting for good. · Protect your skin from too much sun. When you're outdoors from 10 a.m. to 4 p.m., stay in the shade or cover up with clothing and a hat with a wide brim. Wear sunglasses that block UV rays. Even when it's cloudy, put broad-spectrum sunscreen (SPF 30 or higher) on any exposed skin. · See a dentist one or two times a year for checkups and to have your teeth cleaned. · Wear a seat belt in the car. · Limit alcohol to 2 drinks a day for men and 1 drink a day for women. Too much alcohol can cause health problems. Follow your doctor's advice about when to have certain tests. These tests can spot problems early. For men and women · Cholesterol. Your doctor will tell you how often to have this done based on your overall health and other things that can increase your risk for heart attack and stroke. · Blood pressure. Have your blood pressure checked during a routine doctor visit. Your doctor will tell you how often to check your blood pressure based on your age, your blood pressure results, and other factors. · Diabetes. Ask your doctor whether you should have tests for diabetes. · Vision. Experts recommend that you have yearly exams for glaucoma and other age-related eye problems. · Hearing. Tell your doctor if you notice any change in your hearing. You can have tests to find out how well you hear. · Colon cancer tests. Keep having colon cancer tests as your doctor recommends. You can have one of several types of tests. · Heart attack and stroke risk. At least every 4 to 6 years, you should have your risk for heart attack and stroke assessed.  Your doctor uses factors such as your age, blood pressure, cholesterol, and whether you smoke or have diabetes to show what your risk for a heart attack or stroke is over the next 10 years. · Osteoporosis. Talk to your doctor about whether you should have a bone density test to find out whether you have thinning bones. Also ask your doctor about whether you should take calcium and vitamin D supplements. For women · Pap test and pelvic exam. You may no longer need a Pap test. Talk with your doctor about whether to stop or continue to have Pap tests. · Breast exam and mammogram. Ask how often you should have a mammogram, which is an X-ray of your breasts. A mammogram can spot breast cancer before it can be felt and when it is easiest to treat. · Thyroid disease. Talk to your doctor about whether to have your thyroid checked as part of a regular physical exam. Women have an increased chance of a thyroid problem. For men · Prostate exam. Talk to your doctor about whether you should have a blood test (called a PSA test) for prostate cancer. Experts disagree on whether men should have this test. Some experts recommend that you discuss the benefits and risks of the test with your doctor. · Abdominal aortic aneurysm. Ask your doctor whether you should have a test to check for an aneurysm. You may need a test if you ever smoked or if your parent, brother, sister, or child has had an aneurysm. When should you call for help? Watch closely for changes in your health, and be sure to contact your doctor if you have any problems or symptoms that concern you. Where can you learn more? Go to http://julisa-davide.info/. Enter T756 in the search box to learn more about \"Well Visit, Over 65: Care Instructions. \" Current as of: July 19, 2016 Content Version: 11.3 © 3196-1277 [employer]wise, Incorporated. Care instructions adapted under license by [employer] (which disclaims liability or warranty for this information).  If you have questions about a medical condition or this instruction, always ask your healthcare professional. [place] [address] any warranty or liability for your use of this information. Learning About Diabetes Food Guidelines Your Care Instructions Meal planning is important to manage diabetes. It helps keep your blood sugar at a target level (which you set with your doctor). You don't have to eat special foods. You can eat what your family eats, including sweets once in a while. But you do have to pay attention to how often you eat and how much you eat of certain foods. You may want to work with a dietitian or a certified diabetes educator (CDE) to help you plan meals and snacks. A dietitian or CDE can also help you lose weight if that is one of your goals. What should you know about eating carbs? Managing the amount of carbohydrate (carbs) you eat is an important part of healthy meals when you have diabetes. Carbohydrate is found in many foods. · Learn which foods have carbs. And learn the amounts of carbs in different foods. ¨ Bread, cereal, pasta, and rice have about 15 grams of carbs in a serving. A serving is 1 slice of bread (1 ounce), ½ cup of cooked cereal, or 1/3 cup of cooked pasta or rice. ¨ Fruits have 15 grams of carbs in a serving. A serving is 1 small fresh fruit, such as an apple or orange; ½ of a banana; ½ cup of cooked or canned fruit; ½ cup of fruit juice; 1 cup of melon or raspberries; or 2 tablespoons of dried fruit. ¨ Milk and no-sugar-added yogurt have 15 grams of carbs in a serving. A serving is 1 cup of milk or 2/3 cup of no-sugar-added yogurt. ¨ Starchy vegetables have 15 grams of carbs in a serving. A serving is ½ cup of mashed potatoes or sweet potato; 1 cup winter squash; ½ of a small baked potato; ½ cup of cooked beans; or ½ cup cooked corn or green peas. · Learn how much carbs to eat each day and at each meal. A dietitian or CDE can teach you how to keep track of the amount of carbs you eat.  This is called carbohydrate counting. · If you are not sure how to count carbohydrate grams, use the Plate Method to plan meals. It is a good, quick way to make sure that you have a balanced meal. It also helps you spread carbs throughout the day. ¨ Divide your plate by types of foods. Put non-starchy vegetables on half the plate, meat or other protein food on one-quarter of the plate, and a grain or starchy vegetable in the final quarter of the plate. To this you can add a small piece of fruit and 1 cup of milk or yogurt, depending on how many carbs you are supposed to eat at a meal. 
· Try to eat about the same amount of carbs at each meal. Do not \"save up\" your daily allowance of carbs to eat at one meal. 
· Proteins have very little or no carbs per serving. Examples of proteins are beef, chicken, turkey, fish, eggs, tofu, cheese, cottage cheese, and peanut butter. A serving size of meat is 3 ounces, which is about the size of a deck of cards. Examples of meat substitute serving sizes (equal to 1 ounce of meat) are 1/4 cup of cottage cheese, 1 egg, 1 tablespoon of peanut butter, and ½ cup of tofu. How can you eat out and still eat healthy? · Learn to estimate the serving sizes of foods that have carbohydrate. If you measure food at home, it will be easier to estimate the amount in a serving of restaurant food. · If the meal you order has too much carbohydrate (such as potatoes, corn, or baked beans), ask to have a low-carbohydrate food instead. Ask for a salad or green vegetables. · If you use insulin, check your blood sugar before and after eating out to help you plan how much to eat in the future. · If you eat more carbohydrate at a meal than you had planned, take a walk or do other exercise. This will help lower your blood sugar. What else should you know? · Limit saturated fat, such as the fat from meat and dairy products.  This is a healthy choice because people who have diabetes are at higher risk of heart disease. So choose lean cuts of meat and nonfat or low-fat dairy products. Use olive or canola oil instead of butter or shortening when cooking. · Don't skip meals. Your blood sugar may drop too low if you skip meals and take insulin or certain medicines for diabetes. · Check with your doctor before you drink alcohol. Alcohol can cause your blood sugar to drop too low. Alcohol can also cause a bad reaction if you take certain diabetes medicines. Follow-up care is a key part of your treatment and safety. Be sure to make and go to all appointments, and call your doctor if you are having problems. It's also a good idea to know your test results and keep a list of the medicines you take. Where can you learn more? Go to http://julisa-davide.info/. Enter C015 in the search box to learn more about \"Learning About Diabetes Food Guidelines. \" Current as of: March 13, 2017 Content Version: 11.3 © 7290-9895 [employer]BE. Care instructions adapted under license by [employer] (which disclaims liability or warranty for this information). If you have questions about a medical condition or this instruction, always ask your healthcare professional. [address] any warranty or liability for your use of this information. High Cholesterol: Care Instructions Your Care Instructions Cholesterol is a type of fat in your blood. It is needed for many body functions, such as making new cells. Cholesterol is made by your body. It also comes from food you eat. High cholesterol means that you have too much of the fat in your blood. This raises your risk of a heart attack and stroke. LDL and HDL are part of your total cholesterol. LDL is the \"bad\" cholesterol. High LDL can raise your risk for heart disease, heart attack, and stroke. HDL is the \"good\" cholesterol.  It helps clear bad cholesterol from the body. High HDL is linked with a lower risk of heart disease, heart attack, and stroke. Your cholesterol levels help your doctor find out your risk for having a heart attack or stroke. You and your doctor can talk about whether you need to lower your risk and what treatment is best for you. A heart-healthy lifestyle along with medicines can help lower your cholesterol and your risk. The way you choose to lower your risk will depend on how high your risk is for heart attack and stroke. It will also depend on how you feel about taking medicines. Follow-up care is a key part of your treatment and safety. Be sure to make and go to all appointments, and call your doctor if you are having problems. It's also a good idea to know your test results and keep a list of the medicines you take. How can you care for yourself at home? · Eat a variety of foods every day. Good choices include fruits, vegetables, whole grains (like oatmeal), dried beans and peas, nuts and seeds, soy products (like tofu), and fat-free or low-fat dairy products. · Replace butter, margarine, and hydrogenated or partially hydrogenated oils with olive and canola oils. (Canola oil margarine without trans fat is fine.) · Replace red meat with fish, poultry, and soy protein (like tofu). · Limit processed and packaged foods like chips, crackers, and cookies. · Bake, broil, or steam foods. Don't longo them. · Be physically active. Get at least 30 minutes of exercise on most days of the week. Walking is a good choice. You also may want to do other activities, such as running, swimming, cycling, or playing tennis or team sports. · Stay at a healthy weight or lose weight by making the changes in eating and physical activity listed above. Losing just a small amount of weight, even 5 to 10 pounds, can reduce your risk for having a heart attack or stroke. · Do not smoke. When should you call for help? Watch closely for changes in your health, and be sure to contact your doctor if: 
· You need help making lifestyle changes. · You have questions about your medicine. Where can you learn more? Go to http://julisa-davide.info/. Enter J126 in the search box to learn more about \"High Cholesterol: Care Instructions. \" Current as of: April 3, 2017 Content Version: 11.3 © 4466-6176 [employer]. Care instructions adapted under license by [employer] (which disclaims liability or warranty for this information). If you have questions about a medical condition or this instruction, always ask your healthcare professional. [place] [address] any warranty or liability for your use of this information. Statins: Care Instructions Your Care Instructions Statins are medicines that lower your cholesterol and your risk for a heart attack and stroke. Cholesterol is a type of fat in your blood. If you have too much cholesterol, it can build up in blood vessels. This raises your risk of heart disease, heart attack, and stroke. Statins lower cholesterol by blocking how much cholesterol your body makes. This prevents cholesterol from building up in your blood vessels. This is called hardening of the arteries. It is the starting point for some heart and blood flow problems, such as heart disease. Statins may also reduce inflammation around the buildup (called plaque). This can lower the risk that the plaque will break apart and lead to a heart attack or stroke. A heart-healthy lifestyle is important for lowering your risk whether you take statins or not. This includes eating healthy foods, being active, staying at a healthy weight, and not smoking. You must take statins regularly for them to work well. If you stop, your cholesterol and your risk will go back up. Examples of statins include: · Atorvastatin (Lipitor). · Lovastatin (Mevacor). · Pravastatin (Pravachol). · Simvastatin (Zocor). Statins interact with many medicines. So tell your doctor all of the other medicines that you take. These include prescription medicines, over-the-counter medicines, dietary supplements, and herbal products. Follow-up care is a key part of your treatment and safety. Be sure to make and go to all appointments, and call your doctor if you are having problems. It's also a good idea to know your test results and keep a list of the medicines you take. How can you care for yourself at home? · Take statins exactly as your doctor tells you. High cholesterol has no symptoms. So it is easy to forget to take the pills. Try to make a system that reminds you to take them. · Do not take two or more medicines at the same time unless the doctor told you to. Statins can interact with other medicines. · Always tell your doctor if you think you are having a side effect. If side effects are a problem with one medicine, a different one may be used. · Keep making the lifestyle changes your doctor suggests. Eat heart-healthy foods, be active, don't smoke, and stay at a healthy weight. · Talk to your doctor about avoiding grapefruit juice if you take statins. Grapefruit juice can raise the level of this medicine in your blood. This could increase side effects. When should you call for help? Watch closely for changes in your health, and be sure to contact your doctor if: 
· You have side effects of statins. These include: ¨ Fatigue. ¨ Upset stomach. ¨ Gas. ¨ Constipation. ¨ Pain or cramps in the belly. ¨ Muscle aches. · You have any new symptoms or side effects. Where can you learn more? Go to http://julisa-davide.info/. Enter R358 in the search box to learn more about \"Statins: Care Instructions. \" Current as of: April 3, 2017 Content Version: 11.3 © 3880-4715 [employer].  Care instructions adapted under license by [address] (which disclaims liability or warranty for this information). If you have questions about a medical condition or this instruction, always ask your healthcare professional. [address] any warranty or liability for your use of this information. Heart-Healthy Diet: Care Instructions Your Care Instructions A heart-healthy diet has lots of vegetables, fruits, nuts, beans, and whole grains, and is low in salt. It limits foods that are high in saturated fat, such as meats, cheeses, and fried foods. It may be hard to change your diet, but even small changes can lower your risk of heart attack and heart disease. Follow-up care is a key part of your treatment and safety. Be sure to make and go to all appointments, and call your doctor if you are having problems. It's also a good idea to know your test results and keep a list of the medicines you take. How can you care for yourself at home? Watch your portions · Learn what a serving is. A \"serving\" and a \"portion\" are not always the same thing. Make sure that you are not eating larger portions than are recommended. For example, a serving of pasta is ½ cup. A serving size of meat is 2 to 3 ounces. A 3-ounce serving is about the size of a deck of cards. Measure serving sizes until you are good at [place]" them. Keep in mind that restaurants often serve portions that are 2 or 3 times the size of one serving. · To keep your energy level up and keep you from feeling hungry, eat often but in smaller portions. · Eat only the number of calories you need to stay at a healthy weight. If you need to lose weight, eat fewer calories than your body burns (through exercise and other physical activity). Eat more fruits and vegetables · Eat a variety of fruit and vegetables every day. Dark green, deep orange, red, or yellow fruits and vegetables are especially good for you. Examples include spinach, carrots, peaches, and berries. · Keep carrots, celery, and other veggies handy for snacks. Buy fruit that is in season and store it where you can see it so that you will be tempted to eat it. · Cook dishes that have a lot of veggies in them, such as stir-fries and soups. Limit saturated and trans fat · Read food labels, and try to avoid saturated and trans fats. They increase your risk of heart disease. Trans fat is found in many processed foods such as cookies and crackers. · Use olive or canola oil when you cook. Try cholesterol-lowering spreads, such as Benecol or Take Control. · Bake, broil, grill, or steam foods instead of frying them. · Choose lean meats instead of high-fat meats such as hot dogs and sausages. Cut off all visible fat when you prepare meat. · Eat fish, skinless poultry, and meat alternatives such as soy products instead of high-fat meats. Soy products, such as tofu, may be especially good for your heart. · Choose low-fat or fat-free milk and dairy products. Eat fish · Eat at least two servings of fish a week. Certain fish, such as salmon and tuna, contain omega-3 fatty acids, which may help reduce your risk of heart attack. Eat foods high in fiber · Eat a variety of grain products every day. Include whole-grain foods that have lots of fiber and nutrients. Examples of whole-grain foods include oats, whole wheat bread, and brown rice. · Buy whole-grain breads and cereals, instead of white bread or pastries. Limit salt and sodium · Limit how much salt and sodium you eat to help lower your blood pressure. · Taste food before you salt it. Add only a little salt when you think you need it. With time, your taste buds will adjust to less salt. · Eat fewer snack items, fast foods, and other high-salt, processed foods. Check food labels for the amount of sodium in packaged foods.  
· Choose low-sodium versions of canned goods (such as soups, vegetables, and beans). Limit sugar · Limit drinks and foods with added sugar. These include candy, desserts, and soda pop. Limit alcohol · Limit alcohol to no more than 2 drinks a day for men and 1 drink a day for women. Too much alcohol can cause health problems. When should you call for help? Watch closely for changes in your health, and be sure to contact your doctor if: 
· You would like help planning heart-healthy meals. Where can you learn more? Go to http://julisa-davide.info/. Enter V137 in the search box to learn more about \"Heart-Healthy Diet: Care Instructions. \" Current as of: April 3, 2017 Content Version: 11.3 © 5091-5289 [employer]. Care instructions adapted under license by [employer] (which disclaims liability or warranty for this information). If you have questions about a medical condition or this instruction, always ask your healthcare professional. [address] any warranty or liability for your use of this information. Introducing [facility]! Dear Vika Echavarria: Thank you for requesting a Fleksy account. Our records indicate that you already have an active Fleksy account. You can access your account anytime at [email or web address] Publishing. [employer] Publishing Did you know that you can access your hospital and ER discharge instructions at any time in Fleksy? You can also review all of your test results from your hospital stay or ER visit. Additional Information If you have questions, please visit the Frequently Asked Questions section of the Fleksy website at [email or web address] [employer]/. Remember, Fleksy is NOT to be used for urgent needs. For medical emergencies, dial 911. Now available from your iPhone and Android! Please provide this summary of care documentation to your next provider. Your primary care clinician is listed as Jennei Bajwa.  If you have any questions after today's visit, please call [phone].

## 2017-07-05 NOTE — PATIENT INSTRUCTIONS
Well Visit, Over 72: Care Instructions  Your Care Instructions  Physical exams can help you stay healthy. Your doctor has checked your overall health and may have suggested ways to take good care of yourself. He or she also may have recommended tests. At home, you can help prevent illness with healthy eating, regular exercise, and other steps. Follow-up care is a key part of your treatment and safety. Be sure to make and go to all appointments, and call your doctor if you are having problems. It's also a good idea to know your test results and keep a list of the medicines you take. How can you care for yourself at home? · Reach and stay at a healthy weight. This will lower your risk for many problems, such as obesity, diabetes, heart disease, and high blood pressure. · Get at least 30 minutes of exercise on most days of the week. Walking is a good choice. You also may want to do other activities, such as running, swimming, cycling, or playing tennis or team sports. · Do not smoke. Smoking can make health problems worse. If you need help quitting, talk to your doctor about stop-smoking programs and medicines. These can increase your chances of quitting for good. · Protect your skin from too much sun. When you're outdoors from 10 a.m. to 4 p.m., stay in the shade or cover up with clothing and a hat with a wide brim. Wear sunglasses that block UV rays. Even when it's cloudy, put broad-spectrum sunscreen (SPF 30 or higher) on any exposed skin. · See a dentist one or two times a year for checkups and to have your teeth cleaned. · Wear a seat belt in the car. · Limit alcohol to 2 drinks a day for men and 1 drink a day for women. Too much alcohol can cause health problems. Follow your doctor's advice about when to have certain tests. These tests can spot problems early. For men and women  · Cholesterol.  Your doctor will tell you how often to have this done based on your overall health and other things that can increase your risk for heart attack and stroke. · Blood pressure. Have your blood pressure checked during a routine doctor visit. Your doctor will tell you how often to check your blood pressure based on your age, your blood pressure results, and other factors. · Diabetes. Ask your doctor whether you should have tests for diabetes. · Vision. Experts recommend that you have yearly exams for glaucoma and other age-related eye problems. · Hearing. Tell your doctor if you notice any change in your hearing. You can have tests to find out how well you hear. · Colon cancer tests. Keep having colon cancer tests as your doctor recommends. You can have one of several types of tests. · Heart attack and stroke risk. At least every 4 to 6 years, you should have your risk for heart attack and stroke assessed. Your doctor uses factors such as your age, blood pressure, cholesterol, and whether you smoke or have diabetes to show what your risk for a heart attack or stroke is over the next 10 years. · Osteoporosis. Talk to your doctor about whether you should have a bone density test to find out whether you have thinning bones. Also ask your doctor about whether you should take calcium and vitamin D supplements. For women  · Pap test and pelvic exam. You may no longer need a Pap test. Talk with your doctor about whether to stop or continue to have Pap tests. · Breast exam and mammogram. Ask how often you should have a mammogram, which is an X-ray of your breasts. A mammogram can spot breast cancer before it can be felt and when it is easiest to treat. · Thyroid disease. Talk to your doctor about whether to have your thyroid checked as part of a regular physical exam. Women have an increased chance of a thyroid problem. For men  · Prostate exam. Talk to your doctor about whether you should have a blood test (called a PSA test) for prostate cancer.  Experts disagree on whether men should have this test. Some experts recommend that you discuss the benefits and risks of the test with your doctor. · Abdominal aortic aneurysm. Ask your doctor whether you should have a test to check for an aneurysm. You may need a test if you ever smoked or if your parent, brother, sister, or child has had an aneurysm. When should you call for help? Watch closely for changes in your health, and be sure to contact your doctor if you have any problems or symptoms that concern you. Where can you learn more? Go to http://julisa-davide.info/. Enter M009 in the search box to learn more about \"Well Visit, Over 65: Care Instructions. \"  Current as of: July 19, 2016  Content Version: 11.3  © 1457-8394 [employer]. Care instructions adapted under license by [employer] (which disclaims liability or warranty for this information). If you have questions about a medical condition or this instruction, always ask your healthcare professional. [address] any warranty or liability for your use of this information. Learning About Diabetes Food Guidelines  Your Care Instructions  Meal planning is important to manage diabetes. It helps keep your blood sugar at a target level (which you set with your doctor). You don't have to eat special foods. You can eat what your family eats, including sweets once in a while. But you do have to pay attention to how often you eat and how much you eat of certain foods. You may want to work with a dietitian or a certified diabetes educator (CDE) to help you plan meals and snacks. A dietitian or CDE can also help you lose weight if that is one of your goals. What should you know about eating carbs? Managing the amount of carbohydrate (carbs) you eat is an important part of healthy meals when you have diabetes. Carbohydrate is found in many foods. · Learn which foods have carbs. And learn the amounts of carbs in different foods.   ¨ Bread, cereal, pasta, and rice have about 15 grams of carbs in a serving. A serving is 1 slice of bread (1 ounce), ½ cup of cooked cereal, or 1/3 cup of cooked pasta or rice. ¨ Fruits have 15 grams of carbs in a serving. A serving is 1 small fresh fruit, such as an apple or orange; ½ of a banana; ½ cup of cooked or canned fruit; ½ cup of fruit juice; 1 cup of melon or raspberries; or 2 tablespoons of dried fruit. ¨ Milk and no-sugar-added yogurt have 15 grams of carbs in a serving. A serving is 1 cup of milk or 2/3 cup of no-sugar-added yogurt. ¨ Starchy vegetables have 15 grams of carbs in a serving. A serving is ½ cup of mashed potatoes or sweet potato; 1 cup winter squash; ½ of a small baked potato; ½ cup of cooked beans; or ½ cup cooked corn or green peas. · Learn how much carbs to eat each day and at each meal. A dietitian or CDE can teach you how to keep track of the amount of carbs you eat. This is called carbohydrate counting. · If you are not sure how to count carbohydrate grams, use the Plate Method to plan meals. It is a good, quick way to make sure that you have a balanced meal. It also helps you spread carbs throughout the day. ¨ Divide your plate by types of foods. Put non-starchy vegetables on half the plate, meat or other protein food on one-quarter of the plate, and a grain or starchy vegetable in the final quarter of the plate. To this you can add a small piece of fruit and 1 cup of milk or yogurt, depending on how many carbs you are supposed to eat at a meal.  · Try to eat about the same amount of carbs at each meal. Do not \"save up\" your daily allowance of carbs to eat at one meal.  · Proteins have very little or no carbs per serving. Examples of proteins are beef, chicken, turkey, fish, eggs, tofu, cheese, cottage cheese, and peanut butter. A serving size of meat is 3 ounces, which is about the size of a deck of cards.  Examples of meat substitute serving sizes (equal to 1 ounce of meat) are 1/4 cup of cottage cheese, 1 egg, 1 tablespoon of peanut butter, and ½ cup of tofu. How can you eat out and still eat healthy? · Learn to estimate the serving sizes of foods that have carbohydrate. If you measure food at home, it will be easier to estimate the amount in a serving of restaurant food. · If the meal you order has too much carbohydrate (such as potatoes, corn, or baked beans), ask to have a low-carbohydrate food instead. Ask for a salad or green vegetables. · If you use insulin, check your blood sugar before and after eating out to help you plan how much to eat in the future. · If you eat more carbohydrate at a meal than you had planned, take a walk or do other exercise. This will help lower your blood sugar. What else should you know? · Limit saturated fat, such as the fat from meat and dairy products. This is a healthy choice because people who have diabetes are at higher risk of heart disease. So choose lean cuts of meat and nonfat or low-fat dairy products. Use olive or canola oil instead of butter or shortening when cooking. · Don't skip meals. Your blood sugar may drop too low if you skip meals and take insulin or certain medicines for diabetes. · Check with your doctor before you drink alcohol. Alcohol can cause your blood sugar to drop too low. Alcohol can also cause a bad reaction if you take certain diabetes medicines. Follow-up care is a key part of your treatment and safety. Be sure to make and go to all appointments, and call your doctor if you are having problems. It's also a good idea to know your test results and keep a list of the medicines you take. Where can you learn more? Go to http://julisa-davide.info/. Enter R751 in the search box to learn more about \"Learning About Diabetes Food Guidelines. \"  Current as of: March 13, 2017  Content Version: 11.3  © 2813-1872 [employer].  Care instructions adapted under license by [employer] (which disclaims liability or warranty for this information). If you have questions about a medical condition or this instruction, always ask your healthcare professional. [address] any warranty or liability for your use of this information. High Cholesterol: Care Instructions  Your Care Instructions  Cholesterol is a type of fat in your blood. It is needed for many body functions, such as making new cells. Cholesterol is made by your body. It also comes from food you eat. High cholesterol means that you have too much of the fat in your blood. This raises your risk of a heart attack and stroke. LDL and HDL are part of your total cholesterol. LDL is the \"bad\" cholesterol. High LDL can raise your risk for heart disease, heart attack, and stroke. HDL is the \"good\" cholesterol. It helps clear bad cholesterol from the body. High HDL is linked with a lower risk of heart disease, heart attack, and stroke. Your cholesterol levels help your doctor find out your risk for having a heart attack or stroke. You and your doctor can talk about whether you need to lower your risk and what treatment is best for you. A heart-healthy lifestyle along with medicines can help lower your cholesterol and your risk. The way you choose to lower your risk will depend on how high your risk is for heart attack and stroke. It will also depend on how you feel about taking medicines. Follow-up care is a key part of your treatment and safety. Be sure to make and go to all appointments, and call your doctor if you are having problems. It's also a good idea to know your test results and keep a list of the medicines you take. How can you care for yourself at home? · Eat a variety of foods every day. Good choices include fruits, vegetables, whole grains (like oatmeal), dried beans and peas, nuts and seeds, soy products (like tofu), and fat-free or low-fat dairy products.   · Replace butter, margarine, and hydrogenated or partially hydrogenated oils with olive and canola oils. (Canola oil margarine without trans fat is fine.)  · Replace red meat with fish, poultry, and soy protein (like tofu). · Limit processed and packaged foods like chips, crackers, and cookies. · Bake, broil, or steam foods. Don't longo them. · Be physically active. Get at least 30 minutes of exercise on most days of the week. Walking is a good choice. You also may want to do other activities, such as running, swimming, cycling, or playing tennis or team sports. · Stay at a healthy weight or lose weight by making the changes in eating and physical activity listed above. Losing just a small amount of weight, even 5 to 10 pounds, can reduce your risk for having a heart attack or stroke. · Do not smoke. When should you call for help? Watch closely for changes in your health, and be sure to contact your doctor if:  · You need help making lifestyle changes. · You have questions about your medicine. Where can you learn more? Go to http://julisa-davide.info/. Enter X853 in the search box to learn more about \"High Cholesterol: Care Instructions. \"  Current as of: April 3, 2017  Content Version: 11.3  © 0619-7937 [employer]. Care instructions adapted under license by [employer] (which disclaims liability or warranty for this information). If you have questions about a medical condition or this instruction, always ask your healthcare professional. [place] [address] any warranty or liability for your use of this information. Statins: Care Instructions  Your Care Instructions  Statins are medicines that lower your cholesterol and your risk for a heart attack and stroke. Cholesterol is a type of fat in your blood. If you have too much cholesterol, it can build up in blood vessels. This raises your risk of heart disease, heart attack, and stroke.   Statins lower cholesterol by blocking how much cholesterol your body makes. This prevents cholesterol from building up in your blood vessels. This is called hardening of the arteries. It is the starting point for some heart and blood flow problems, such as heart disease. Statins may also reduce inflammation around the buildup (called plaque). This can lower the risk that the plaque will break apart and lead to a heart attack or stroke. A heart-healthy lifestyle is important for lowering your risk whether you take statins or not. This includes eating healthy foods, being active, staying at a healthy weight, and not smoking. You must take statins regularly for them to work well. If you stop, your cholesterol and your risk will go back up. Examples of statins include:  · Atorvastatin (Lipitor). · Lovastatin (Mevacor). · Pravastatin (Pravachol). · Simvastatin (Zocor). Statins interact with many medicines. So tell your doctor all of the other medicines that you take. These include prescription medicines, over-the-counter medicines, dietary supplements, and herbal products. Follow-up care is a key part of your treatment and safety. Be sure to make and go to all appointments, and call your doctor if you are having problems. It's also a good idea to know your test results and keep a list of the medicines you take. How can you care for yourself at home? · Take statins exactly as your doctor tells you. High cholesterol has no symptoms. So it is easy to forget to take the pills. Try to make a system that reminds you to take them. · Do not take two or more medicines at the same time unless the doctor told you to. Statins can interact with other medicines. · Always tell your doctor if you think you are having a side effect. If side effects are a problem with one medicine, a different one may be used. · Keep making the lifestyle changes your doctor suggests. Eat heart-healthy foods, be active, don't smoke, and stay at a healthy weight.   · Talk to your doctor about avoiding grapefruit juice if you take statins. Grapefruit juice can raise the level of this medicine in your blood. This could increase side effects. When should you call for help? Watch closely for changes in your health, and be sure to contact your doctor if:  · You have side effects of statins. These include:  ¨ Fatigue. ¨ Upset stomach. ¨ Gas. ¨ Constipation. ¨ Pain or cramps in the belly. ¨ Muscle aches. · You have any new symptoms or side effects. Where can you learn more? Go to http://julisa-davide.info/. Enter R358 in the search box to learn more about \"Statins: Care Instructions. \"  Current as of: April 3, 2017  Content Version: 11.3  © 4963-9376 [employer]. Care instructions adapted under license by [employer] (which disclaims liability or warranty for this information). If you have questions about a medical condition or this instruction, always ask your healthcare professional. [place] [address] any warranty or liability for your use of this information. Heart-Healthy Diet: Care Instructions  Your Care Instructions    A heart-healthy diet has lots of vegetables, fruits, nuts, beans, and whole grains, and is low in salt. It limits foods that are high in saturated fat, such as meats, cheeses, and fried foods. It may be hard to change your diet, but even small changes can lower your risk of heart attack and heart disease. Follow-up care is a key part of your treatment and safety. Be sure to make and go to all appointments, and call your doctor if you are having problems. It's also a good idea to know your test results and keep a list of the medicines you take. How can you care for yourself at home? Watch your portions  · Learn what a serving is. A \"serving\" and a \"portion\" are not always the same thing. Make sure that you are not eating larger portions than are recommended. For example, a serving of pasta is ½ cup.  A serving size of meat is 2 to 3 ounces. A 3-ounce serving is about the size of a deck of cards. Measure serving sizes until you are good at [place]" them. Keep in mind that restaurants often serve portions that are 2 or 3 times the size of one serving. · To keep your energy level up and keep you from feeling hungry, eat often but in smaller portions. · Eat only the number of calories you need to stay at a healthy weight. If you need to lose weight, eat fewer calories than your body burns (through exercise and other physical activity). Eat more fruits and vegetables  · Eat a variety of fruit and vegetables every day. Dark green, deep orange, red, or yellow fruits and vegetables are especially good for you. Examples include spinach, carrots, peaches, and berries. · Keep carrots, celery, and other veggies handy for snacks. Buy fruit that is in season and store it where you can see it so that you will be tempted to eat it. · Cook dishes that have a lot of veggies in them, such as stir-fries and soups. Limit saturated and trans fat  · Read food labels, and try to avoid saturated and trans fats. They increase your risk of heart disease. Trans fat is found in many processed foods such as cookies and crackers. · Use olive or canola oil when you cook. Try cholesterol-lowering spreads, such as Benecol or Take Control. · Bake, broil, grill, or steam foods instead of frying them. · Choose lean meats instead of high-fat meats such as hot dogs and sausages. Cut off all visible fat when you prepare meat. · Eat fish, skinless poultry, and meat alternatives such as soy products instead of high-fat meats. Soy products, such as tofu, may be especially good for your heart. · Choose low-fat or fat-free milk and dairy products. Eat fish  · Eat at least two servings of fish a week. Certain fish, such as salmon and tuna, contain omega-3 fatty acids, which may help reduce your risk of heart attack.   Eat foods high in fiber  · Eat a variety of grain products every day. Include whole-grain foods that have lots of fiber and nutrients. Examples of whole-grain foods include oats, whole wheat bread, and brown rice. · Buy whole-grain breads and cereals, instead of white bread or pastries. Limit salt and sodium  · Limit how much salt and sodium you eat to help lower your blood pressure. · Taste food before you salt it. Add only a little salt when you think you need it. With time, your taste buds will adjust to less salt. · Eat fewer snack items, fast foods, and other high-salt, processed foods. Check food labels for the amount of sodium in packaged foods. · Choose low-sodium versions of canned goods (such as soups, vegetables, and beans). Limit sugar  · Limit drinks and foods with added sugar. These include candy, desserts, and soda pop. Limit alcohol  · Limit alcohol to no more than 2 drinks a day for men and 1 drink a day for women. Too much alcohol can cause health problems. When should you call for help? Watch closely for changes in your health, and be sure to contact your doctor if:  · You would like help planning heart-healthy meals. Where can you learn more? Go to http://julisa-davide.info/. Enter V137 in the search box to learn more about \"Heart-Healthy Diet: Care Instructions. \"  Current as of: April 3, 2017  Content Version: 11.3  © 6054-0874 [employer]. Care instructions adapted under license by [employer] (which disclaims liability or warranty for this information). If you have questions about a medical condition or this instruction, always ask your healthcare professional. [place] [address] any warranty or liability for your use of this information.

## 2017-07-06 LAB
ALBUMIN SERPL-MCNC: 4.5 G/DL (ref 3.5–4.8)
ALBUMIN/GLOB SERPL: 1.9 {RATIO} (ref 1.2–2.2)
ALP SERPL-CCNC: 47 IU/L (ref 39–117)
ALT SERPL-CCNC: 16 IU/L (ref 0–44)
AST SERPL-CCNC: 23 IU/L (ref 0–40)
BILIRUB SERPL-MCNC: 1.8 MG/DL (ref 0–1.2)
BUN SERPL-MCNC: 24 MG/DL (ref 8–27)
BUN/CREAT SERPL: 22 (ref 10–24)
CALCIUM SERPL-MCNC: 10.2 MG/DL (ref 8.6–10.2)
CHLORIDE SERPL-SCNC: 101 MMOL/L (ref 96–106)
CHOLEST SERPL-MCNC: 163 MG/DL (ref 100–199)
CO2 SERPL-SCNC: 27 MMOL/L (ref 18–29)
CREAT SERPL-MCNC: 1.11 MG/DL (ref 0.76–1.27)
ERYTHROCYTE [DISTWIDTH] IN BLOOD BY AUTOMATED COUNT: 14.8 % (ref 12.3–15.4)
FERRITIN SERPL-MCNC: 68 NG/ML (ref 30–400)
GLOBULIN SER CALC-MCNC: 2.4 G/DL (ref 1.5–4.5)
GLUCOSE SERPL-MCNC: 84 MG/DL (ref 65–99)
HCT VFR BLD AUTO: 42.2 % (ref 37.5–51)
HDLC SERPL-MCNC: 70 MG/DL
HGB BLD-MCNC: 13.4 G/DL (ref 12.6–17.7)
IRON SATN MFR SERPL: 55 % (ref 15–55)
IRON SERPL-MCNC: 163 UG/DL (ref 38–169)
LDLC SERPL CALC-MCNC: 78 MG/DL (ref 0–99)
MCH RBC QN AUTO: 31.9 PG (ref 26.6–33)
MCHC RBC AUTO-ENTMCNC: 31.8 G/DL (ref 31.5–35.7)
MCV RBC AUTO: 101 FL (ref 79–97)
PLATELET # BLD AUTO: 137 X10E3/UL (ref 150–379)
POTASSIUM SERPL-SCNC: 5.1 MMOL/L (ref 3.5–5.2)
PROT SERPL-MCNC: 6.9 G/DL (ref 6–8.5)
RBC # BLD AUTO: 4.2 X10E6/UL (ref 4.14–5.8)
SODIUM SERPL-SCNC: 144 MMOL/L (ref 134–144)
TIBC SERPL-MCNC: 299 UG/DL (ref 250–450)
TRIGL SERPL-MCNC: 73 MG/DL (ref 0–149)
TSH SERPL DL<=0.005 MIU/L-ACNC: 0.67 UIU/ML (ref 0.45–4.5)
UIBC SERPL-MCNC: 136 UG/DL (ref 111–343)
VLDLC SERPL CALC-MCNC: 15 MG/DL (ref 5–40)
WBC # BLD AUTO: 5.5 X10E3/UL (ref 3.4–10.8)

## 2017-12-27 ENCOUNTER — LAB ONLY (OUTPATIENT)
Dept: FAMILY MEDICINE CLINIC | Age: 77
End: 2017-12-27

## 2017-12-27 DIAGNOSIS — E78.00 HYPERCHOLESTEROLEMIA: ICD-10-CM

## 2017-12-27 DIAGNOSIS — R97.20 ELEVATED PSA: ICD-10-CM

## 2017-12-27 DIAGNOSIS — Z00.00 GENERAL MEDICAL EXAM: ICD-10-CM

## 2017-12-27 DIAGNOSIS — I25.10 ASHD (ARTERIOSCLEROTIC HEART DISEASE): Primary | ICD-10-CM

## 2017-12-27 DIAGNOSIS — I10 HTN, GOAL BELOW 130/80: ICD-10-CM

## 2017-12-27 DIAGNOSIS — R73.03 PREDIABETES: ICD-10-CM

## 2017-12-27 NOTE — PROGRESS NOTES
Order placed for  Routine labs, per Verbal Order from Dr. Ange Hernandez on 12/27/2017 due to need for routine labs

## 2017-12-28 LAB
ALBUMIN SERPL-MCNC: 4.1 G/DL (ref 3.5–4.8)
ALBUMIN/GLOB SERPL: 2 {RATIO} (ref 1.2–2.2)
ALP SERPL-CCNC: 43 IU/L (ref 39–117)
ALT SERPL-CCNC: 23 IU/L (ref 0–44)
AST SERPL-CCNC: 29 IU/L (ref 0–40)
BILIRUB SERPL-MCNC: 0.6 MG/DL (ref 0–1.2)
BUN SERPL-MCNC: 18 MG/DL (ref 8–27)
BUN/CREAT SERPL: 17 (ref 10–24)
CALCIUM SERPL-MCNC: 9 MG/DL (ref 8.6–10.2)
CHLORIDE SERPL-SCNC: 101 MMOL/L (ref 96–106)
CHOLEST SERPL-MCNC: 167 MG/DL (ref 100–199)
CO2 SERPL-SCNC: 30 MMOL/L (ref 18–29)
CREAT SERPL-MCNC: 1.09 MG/DL (ref 0.76–1.27)
ERYTHROCYTE [DISTWIDTH] IN BLOOD BY AUTOMATED COUNT: 13.8 % (ref 12.3–15.4)
GLOBULIN SER CALC-MCNC: 2.1 G/DL (ref 1.5–4.5)
GLUCOSE SERPL-MCNC: 91 MG/DL (ref 65–99)
HCT VFR BLD AUTO: 36.7 % (ref 37.5–51)
HDLC SERPL-MCNC: 66 MG/DL
HGB BLD-MCNC: 12.6 G/DL (ref 13–17.7)
LDLC SERPL CALC-MCNC: 88 MG/DL (ref 0–99)
MCH RBC QN AUTO: 33.2 PG (ref 26.6–33)
MCHC RBC AUTO-ENTMCNC: 34.3 G/DL (ref 31.5–35.7)
MCV RBC AUTO: 97 FL (ref 79–97)
PLATELET # BLD AUTO: 138 X10E3/UL (ref 150–379)
POTASSIUM SERPL-SCNC: 4.2 MMOL/L (ref 3.5–5.2)
PROT SERPL-MCNC: 6.2 G/DL (ref 6–8.5)
PSA SERPL-MCNC: 3 NG/ML (ref 0–4)
RBC # BLD AUTO: 3.79 X10E6/UL (ref 4.14–5.8)
SODIUM SERPL-SCNC: 142 MMOL/L (ref 134–144)
TRIGL SERPL-MCNC: 64 MG/DL (ref 0–149)
VLDLC SERPL CALC-MCNC: 13 MG/DL (ref 5–40)
WBC # BLD AUTO: 5.5 X10E3/UL (ref 3.4–10.8)

## 2018-01-03 ENCOUNTER — OFFICE VISIT (OUTPATIENT)
Dept: FAMILY MEDICINE CLINIC | Age: 78
End: 2018-01-03

## 2018-01-03 VITALS
SYSTOLIC BLOOD PRESSURE: 124 MMHG | DIASTOLIC BLOOD PRESSURE: 57 MMHG | WEIGHT: 178.6 LBS | HEART RATE: 63 BPM | RESPIRATION RATE: 14 BRPM | BODY MASS INDEX: 28.03 KG/M2 | HEIGHT: 67 IN | TEMPERATURE: 97.1 F | OXYGEN SATURATION: 99 %

## 2018-01-03 DIAGNOSIS — R73.03 PREDIABETES: ICD-10-CM

## 2018-01-03 DIAGNOSIS — I25.9 CHRONIC ISCHEMIC HEART DISEASE: ICD-10-CM

## 2018-01-03 DIAGNOSIS — Z13.5 SCREENING FOR GLAUCOMA: ICD-10-CM

## 2018-01-03 DIAGNOSIS — Z95.1 S/P CABG X 3: ICD-10-CM

## 2018-01-03 DIAGNOSIS — I25.10 CORONARY ARTERY DISEASE INVOLVING NATIVE CORONARY ARTERY OF NATIVE HEART WITHOUT ANGINA PECTORIS: ICD-10-CM

## 2018-01-03 DIAGNOSIS — Z23 ENCOUNTER FOR IMMUNIZATION: Primary | ICD-10-CM

## 2018-01-03 DIAGNOSIS — E78.00 HYPERCHOLESTEROLEMIA: ICD-10-CM

## 2018-01-03 DIAGNOSIS — S22.080A T12 COMPRESSION FRACTURE (HCC): ICD-10-CM

## 2018-01-03 RX ORDER — LOSARTAN POTASSIUM AND HYDROCHLOROTHIAZIDE 25; 100 MG/1; MG/1
1 TABLET ORAL DAILY
Qty: 90 TAB | Refills: 3 | Status: SHIPPED | OUTPATIENT
Start: 2018-01-03 | End: 2018-12-10 | Stop reason: SDUPTHER

## 2018-01-03 RX ORDER — ROSUVASTATIN CALCIUM 20 MG/1
20 TABLET, COATED ORAL
Qty: 90 TAB | Refills: 2 | Status: SHIPPED | OUTPATIENT
Start: 2018-01-03 | End: 2018-10-10 | Stop reason: SDUPTHER

## 2018-01-03 NOTE — PROGRESS NOTES
HISTORY OF PRESENT ILLNESS  Mina Briggs Sr. is a 65 y.o. male. HPI       HTN  Today pt present for Bp check and the patient stating that so far there has been a Compliancy w/ the bp meds, having had the low salt diet and try to the best of pt's knowledge to avoid smoked meats, the patient has been active life style and does do the daily walking, today the pt denies Chest Pain, has no legs swelling no lightheadedness,the pat has not been feeling anxious, and  Has not been feeling stressed out, otherwise feeling better since the last visit    Hypercholestremia  Patient has no personal no family history of eruptive xanthomas, and nor of abdominal pain or pancreatitis and does daily exercises few times per week, has had few pounds of weight gain currently drink alcohol in moderation, on no hormone therapy no family history of early heart disease with the patient's parents and siblings, aware of patient's last lipoprotein profile, blood sugar, TSH, liver and renal function tests,  the patient eats out but not more than average,   and currently, there is no muscle nor abdominal pain, And patient fasting today, the patient is compliant w/ meds, in addition to the intake of daily baby aspirin        Current Outpatient Prescriptions   Medication Sig Dispense Refill    losartan-hydroCHLOROthiazide (HYZAAR) 100-25 mg per tablet Take 1 Tab by mouth daily. Indications: Hypertension 90 Tab 3    ibuprofen (ADVIL) 200 mg tablet Take 3 Tabs by mouth every eight (8) hours as needed for Pain. 40 Tab 0    cholecalciferol (VITAMIN D3) 1,000 unit tablet Take 1 Tab by mouth daily. 30 Tab 11    vitamin e (E GEMS) 1,000 unit capsule Take 1,000 Units by mouth daily.  aspirin 81 mg tablet Take 81 mg by mouth.  MULTIVITAMIN (MULTIPLE VITAMIN PO) Take 1 Tab by mouth daily.       vitamins a & d cap       psyllium (METAMUCIL) powd One cup w/ 12 oz of water twice daily and hold for loose stool (Patient not taking: Reported on 1/3/2018) 660 g 6    ondansetron (ZOFRAN ODT) 4 mg disintegrating tablet Take 1 Tab by mouth every eight (8) hours as needed for Nausea. (Patient not taking: Reported on 1/3/2018) 10 Tab 0     Allergies   Allergen Reactions    Ace Inhibitors Cough     Past Medical History:   Diagnosis Date    Acute bronchitis 3/16/2016    Back pain, chronic 11/7/2014    Breast lump on left side at 11 o'clock position 5/7/2015    CAD (coronary artery disease)     Cat scratch of right forearm with infection 6/30/2016    Chronic bronchitis (HCC) 4/5/2016    Chronic idiopathic constipation 4/17/2017    Gastroesophageal reflux disease without esophagitis 4/17/2017    H/O asbestos exposure 4/5/2016    H/O asbestos exposure 4/5/2016    Hypercholesterolemia     Hypertension     Ketonuria 4/17/2017    Need for diphtheria-tetanus-pertussis (Tdap) vaccine, adult/adolescent 4/4/2014    Need for pneumococcal vaccination 4/4/2014    Needs flu shot 4/4/2014    S/P CABG x 3     2001    Swelling of right hand 7/7/2016    T12 compression fracture ([address] 11/10/2014     Past Surgical History:   Procedure Laterality Date    CARDIAC SURG PROCEDURE UNLIST      CABG    CARDIAC SURG PROCEDURE UNLIST  2000    bypass     Family History   Problem Relation Age of Onset    Heart Disease Mother     Cancer Father      Social History   Substance Use Topics    Smoking status: Never Smoker    Smokeless tobacco: Never Used    Alcohol use 0.0 oz/week     0 Standard drinks or equivalent per week      Comment: occasionally      Lab Results  Component Value Date/Time   WBC 5.5 12/27/2017 08:37 AM   HGB 12.6 12/27/2017 08:37 AM   HCT 36.7 12/27/2017 08:37 AM   PLATELET 764 91/23/7067 08:37 AM   MCV 97 12/27/2017 08:37 AM     Lab Results  Component Value Date/Time   ALT (SGPT) 23 12/27/2017 08:37 AM   AST (SGOT) 29 12/27/2017 08:37 AM   Alk.  phosphatase 43 12/27/2017 08:37 AM   Bilirubin, direct 0.37 01/30/2013 11:16 AM   Bilirubin, total 0.6 12/27/2017 08:37 AM   Albumin 4.1 12/27/2017 08:37 AM   Protein, total 6.2 12/27/2017 08:37 AM   PLATELET 088 98/66/7794 08:37 AM          Review of Systems   Constitutional: Negative for chills and fever. HENT: Negative for ear pain and nosebleeds. Eyes: Negative for blurred vision, pain and discharge. Respiratory: Negative for shortness of breath. Cardiovascular: Negative for chest pain and leg swelling. Gastrointestinal: Negative for constipation, diarrhea, nausea and vomiting. Genitourinary: Negative for frequency. Musculoskeletal: Negative for joint pain. Skin: Negative for itching and rash. Neurological: Negative for headaches. Psychiatric/Behavioral: Negative for depression. The patient is not nervous/anxious. Physical Exam   Constitutional: He is oriented to person, place, and time. He appears well-developed and well-nourished. HENT:   Head: Normocephalic and atraumatic. Mouth/Throat: No oropharyngeal exudate. Eyes: Conjunctivae and EOM are normal.   Neck: Normal range of motion. Neck supple. Cardiovascular: Normal rate, regular rhythm and normal heart sounds. No murmur heard. Pulmonary/Chest: Effort normal and breath sounds normal. No respiratory distress. Abdominal: Soft. Bowel sounds are normal. He exhibits no distension. There is no rebound. Musculoskeletal: He exhibits no edema or tenderness. Neurological: He is alert and oriented to person, place, and time. Skin: Skin is warm. No erythema. Psychiatric: He has a normal mood and affect. His behavior is normal.   Nursing note and vitals reviewed. ASSESSMENT and PLAN  Diagnoses and all orders for this visit:    1. Encounter for immunization  -     varicella zoster vaccine live (ZOSTAVAX) 19,400 unit/0.65 mL susr injection; 1 Vial by SubCUTAneous route once for 1 dose.     2. Screening for glaucoma  -     REFERRAL TO OPTOMETRY    3. Coronary artery disease involving native coronary artery of native heart without angina pectoris    4. S/P CABG x 3    5. Chronic ischemic heart disease    6. Hypercholesterolemia    7. Prediabetes    8. T12 compression fracture (HCC)    Other orders  -     rosuvastatin (CRESTOR) 20 mg tablet; Take 1 Tab by mouth nightly. -     losartan-hydroCHLOROthiazide (HYZAAR) 100-25 mg per tablet; Take 1 Tab by mouth daily. Indications: hypertension    CVD stable, but need and Have to do a healthy lifestyle: cont with Aspirin therapy, acei and cholesterol lowering agents,  use will monitor bp control, addressed smoking cessation, stress reduction,  exercise program were all counseled and advised. Do not smoke or allow others to smoke around you, If you need help quitting, encouraged to talk to me about stop-smoking programs and medicines, Smoking makes a stroke more likely, These can increase the pt's chances of quitting for good,   The pt was told to Limit alcohol to 1-2 drinks a day , Lose weight to maintain ideal body wt,  A healthy weight will help the pt keep the heart and body healthy,  The pt was told to be active, the pt was informed which type and level of activity is safe for the pt,   The pt was also told to eat heart-healthy foods, like fruits, vegetables, and high-fiber foods.

## 2018-01-03 NOTE — PROGRESS NOTES
Name and  verified      Chief Complaint   Patient presents with    Hypertension       Health Maintenance reviewed-discussed with patient. Chief Complaint   Patient presents with    Hypertension       1. Have you been to the ER, urgent care clinic since your last visit? Hospitalized since your last visit? No    2. Have you seen or consulted any other health care providers outside of the [address] [place] [address] since your last visit? Include any pap smears or colon screening.  No

## 2018-01-03 NOTE — MR AVS SNAPSHOT
Visit Information Date & Time Provider Department Dept. Phone Encounter #  
 1/3/2018  9:00 AM Yesica Diehl MD [address] [facility] [phone] [id number] Follow-up Instructions Return in about 6 months (around 7/3/2018), or if symptoms worsen or fail to improve. Upcoming Health Maintenance Date Due ZOSTER VACCINE AGE 60> 1/15/1999 GLAUCOMA SCREENING Q2Y 2/28/2016 MEDICARE YEARLY EXAM 7/6/2018 COLONOSCOPY 4/22/2024 DTaP/Tdap/Td series (2 - Td) 9/15/2025 Allergies as of 1/3/2018  Review Complete On: 1/3/2018 By: Debi Basurto LPN Severity Noted Reaction Type Reactions Ace Inhibitors  04/25/2016    Cough Current Immunizations  Reviewed on 1/3/2018 Name Date Tdap 9/15/2015  8:56 PM  
  
 Reviewed by Yesica Diehl MD on 1/3/2018 at 10:12 AM  
You Were Diagnosed With   
  
 Codes Comments Encounter for immunization    -  Primary ICD-10-CM: H75 ICD-9-CM: V03.89 Screening for glaucoma     ICD-10-CM: Z13.5 ICD-9-CM: V80.1 Coronary artery disease involving native coronary artery of native heart without angina pectoris     ICD-10-CM: I25.10 ICD-9-CM: 414.01 S/P CABG x 3     ICD-10-CM: Z95.1 ICD-9-CM: V45.81 Chronic ischemic heart disease     ICD-10-CM: I25.9 ICD-9-CM: 414.9 Hypercholesterolemia     ICD-10-CM: E78.00 ICD-9-CM: 272.0 Prediabetes     ICD-10-CM: R73.03 
ICD-9-CM: 790.29   
 T12 compression fracture ([address]     ICD-10-CM: M95.871S ICD-9-CM: 133. 2 Vitals BP Pulse Temp Resp Height(growth percentile) Weight(growth percentile) 124/57 (BP 1 Location: Left arm, BP Patient Position: At rest) 63 97.1 °F (36.2 °C) (Oral) 14 5' 7\" (1.702 m) 178 lb 9.6 oz (81 kg) SpO2 BMI Smoking Status 99% 27.97 kg/m2 Never Smoker Vitals History BMI and BSA Data Body Mass Index Body Surface Area  
 27.97 kg/m 2 1.96 m 2 Preferred Pharmacy Pharmacy Name Phone [facility] [address] [place] [address] [facility] [address] [phone] Your Updated Medication List  
  
   
This list is accurate as of: 1/3/18 10:22 AM.  Always use your most recent med list.  
  
  
  
  
 aspirin 81 mg tablet Take 81 mg by mouth. cholecalciferol 1,000 unit tablet Commonly known as:  VITAMIN D3 Take 1 Tab by mouth daily. ibuprofen 200 mg tablet Commonly known as:  ADVIL Take 3 Tabs by mouth every eight (8) hours as needed for Pain.  
  
 losartan-hydroCHLOROthiazide 100-25 mg per tablet Commonly known as:  HYZAAR Take 1 Tab by mouth daily. Indications: hypertension MULTIPLE VITAMIN PO Take 1 Tab by mouth daily. ondansetron 4 mg disintegrating tablet Commonly known as:  ZOFRAN ODT Take 1 Tab by mouth every eight (8) hours as needed for Nausea. psyllium Powd Commonly known as:  METAMUCIL One cup w/ 12 oz of water twice daily and hold for loose stool  
  
 rosuvastatin 20 mg tablet Commonly known as:  CRESTOR Take 1 Tab by mouth nightly. varicella zoster vaccine live 19,400 unit/0.65 mL Susr injection Commonly known as:  ZOSTAVAX  
1 Vial by SubCUTAneous route once for 1 dose. vitamin e 1,000 unit capsule Commonly known as:  E GEMS Take 1,000 Units by mouth daily. vitamins a & d Cap Prescriptions Printed Refills  
 varicella zoster vaccine live (ZOSTAVAX) 19,400 unit/0.65 mL susr injection 0 Si Vial by SubCUTAneous route once for 1 dose. Class: Print Route: SubCUTAneous Prescriptions Sent to Pharmacy Refills  
 rosuvastatin (CRESTOR) 20 mg tablet 2 Sig: Take 1 Tab by mouth nightly. Class: Normal  
 Pharmacy: [address] [place] [address], [address]  #: [phone]  Route: Oral  
 losartan-hydroCHLOROthiazide (HYZAAR) 100-25 mg per tablet 3  
 Sig: Take 1 Tab by mouth daily. Indications: hypertension Class: Normal  
 Pharmacy: [id number] [address] [place] [address] #: [phone] Route: Oral  
  
We Performed the Following REFERRAL TO OPTOMETRY [id number] Custom] Comments:  
 Please evaluate patient for glaucoma. Follow-up Instructions Return in about 6 months (around 7/3/2018), or if symptoms worsen or fail to improve. Referral Information Referral ID Referred By Referred To  
  
 [id number] LIZA RAMIREZ MD   
   [address], [address] Phone: [phone] Fax: [phone] Visits Status Start Date End Date 1 New Request 1/3/18 1/3/19 If your referral has a status of pending review or denied, additional information will be sent to support the outcome of this decision. Introducing [facility]! Dear Nicolette Hernandez: Thank you for requesting a [employer] account. Our records indicate that you have previously registered for a [employer] account but its currently inactive. Please call our [employer] support line at [phone]. Additional Information If you have questions, please visit the Frequently Asked Questions section of the [employer] website at [email or web address] Remember, [employer] is NOT to be used for urgent needs. For medical emergencies, dial 911. Now available from your iPhone and Android! Please provide this summary of care documentation to your next provider. Your primary care clinician is listed as Bi Brown. If you have any questions after today's visit, please call [phone].

## 2018-06-20 ENCOUNTER — LAB ONLY (OUTPATIENT)
Dept: FAMILY MEDICINE CLINIC | Age: 78
End: 2018-06-20

## 2018-06-20 DIAGNOSIS — E78.00 HYPERCHOLESTEROLEMIA: ICD-10-CM

## 2018-06-20 DIAGNOSIS — R73.03 PREDIABETES: Primary | ICD-10-CM

## 2018-06-20 DIAGNOSIS — Z12.12 SCREENING FOR RECTAL CANCER: ICD-10-CM

## 2018-06-20 DIAGNOSIS — I10 HTN, GOAL BELOW 130/80: ICD-10-CM

## 2018-06-20 DIAGNOSIS — R97.20 ELEVATED PSA: ICD-10-CM

## 2018-06-21 LAB
ALBUMIN SERPL-MCNC: 3.9 G/DL (ref 3.5–4.8)
ALBUMIN/GLOB SERPL: 1.7 {RATIO} (ref 1.2–2.2)
ALP SERPL-CCNC: 43 IU/L (ref 39–117)
ALT SERPL-CCNC: 17 IU/L (ref 0–44)
AST SERPL-CCNC: 20 IU/L (ref 0–40)
BILIRUB SERPL-MCNC: 0.7 MG/DL (ref 0–1.2)
BUN SERPL-MCNC: 23 MG/DL (ref 8–27)
BUN/CREAT SERPL: 19 (ref 10–24)
CALCIUM SERPL-MCNC: 9.4 MG/DL (ref 8.6–10.2)
CHLORIDE SERPL-SCNC: 104 MMOL/L (ref 96–106)
CHOLEST SERPL-MCNC: 128 MG/DL (ref 100–199)
CO2 SERPL-SCNC: 26 MMOL/L (ref 20–29)
CREAT SERPL-MCNC: 1.22 MG/DL (ref 0.76–1.27)
ERYTHROCYTE [DISTWIDTH] IN BLOOD BY AUTOMATED COUNT: 13.4 % (ref 12.3–15.4)
GFR SERPLBLD CREATININE-BSD FMLA CKD-EPI: 56 ML/MIN/1.73
GFR SERPLBLD CREATININE-BSD FMLA CKD-EPI: 65 ML/MIN/1.73
GLOBULIN SER CALC-MCNC: 2.3 G/DL (ref 1.5–4.5)
GLUCOSE SERPL-MCNC: 90 MG/DL (ref 65–99)
HCT VFR BLD AUTO: 36 % (ref 37.5–51)
HDLC SERPL-MCNC: 64 MG/DL
HGB BLD-MCNC: 11.8 G/DL (ref 13–17.7)
INTERPRETATION: NORMAL
LDLC SERPL CALC-MCNC: 52 MG/DL (ref 0–99)
MCH RBC QN AUTO: 32 PG (ref 26.6–33)
MCHC RBC AUTO-ENTMCNC: 32.8 G/DL (ref 31.5–35.7)
MCV RBC AUTO: 98 FL (ref 79–97)
PLATELET # BLD AUTO: 133 X10E3/UL (ref 150–379)
POTASSIUM SERPL-SCNC: 4.5 MMOL/L (ref 3.5–5.2)
PROT SERPL-MCNC: 6.2 G/DL (ref 6–8.5)
PSA SERPL-MCNC: 3 NG/ML (ref 0–4)
RBC # BLD AUTO: 3.69 X10E6/UL (ref 4.14–5.8)
SODIUM SERPL-SCNC: 145 MMOL/L (ref 134–144)
TRIGL SERPL-MCNC: 58 MG/DL (ref 0–149)
VLDLC SERPL CALC-MCNC: 12 MG/DL (ref 5–40)
WBC # BLD AUTO: 4.9 X10E3/UL (ref 3.4–10.8)

## 2018-06-28 LAB — HEMOCCULT STL QL IA: NEGATIVE

## 2018-07-03 ENCOUNTER — OFFICE VISIT (OUTPATIENT)
Dept: FAMILY MEDICINE CLINIC | Age: 78
End: 2018-07-03

## 2018-07-03 VITALS
RESPIRATION RATE: 16 BRPM | HEIGHT: 67 IN | OXYGEN SATURATION: 96 % | SYSTOLIC BLOOD PRESSURE: 128 MMHG | DIASTOLIC BLOOD PRESSURE: 56 MMHG | BODY MASS INDEX: 27.21 KG/M2 | HEART RATE: 60 BPM | TEMPERATURE: 96.4 F | WEIGHT: 173.4 LBS

## 2018-07-03 DIAGNOSIS — Z23 ENCOUNTER FOR IMMUNIZATION: Primary | ICD-10-CM

## 2018-07-03 DIAGNOSIS — Z13.39 SCREENING FOR ALCOHOLISM: ICD-10-CM

## 2018-07-03 DIAGNOSIS — Z13.31 SCREENING FOR DEPRESSION: ICD-10-CM

## 2018-07-03 RX ORDER — DOCUSATE SODIUM 100 MG/1
100 CAPSULE ORAL
COMMUNITY
Start: 2018-04-01

## 2018-07-03 NOTE — MR AVS SNAPSHOT
[facility] [place] [facility] [place] [facility] [id number] [phone] Patient: Paulette Phillips. MRN: [id number] THE:8/09/0609 Visit Information Date & Time Provider Department Dept. Phone Encounter #  
 7/3/2018  8:00 AM Aayush Rachel MD [address] [place] [address] OFFICE-ANNEX [phone] [id number] Follow-up Instructions Return in about 1 year (around 7/3/2019), or if symptoms worsen or fail to improve. Upcoming Health Maintenance Date Due ZOSTER VACCINE AGE 60> 1/15/1999 GLAUCOMA SCREENING Q2Y 2/28/2016 MEDICARE YEARLY EXAM 7/6/2018 Influenza Age 5 to Adult 8/1/2018 COLONOSCOPY 4/22/2024 DTaP/Tdap/Td series (2 - Td) 9/15/2025 Allergies as of 7/3/2018  Review Complete On: 7/3/2018 By: Aayush Rachel MD  
  
 Severity Noted Reaction Type Reactions Ace Inhibitors  04/25/2016    Cough Current Immunizations  Reviewed on 1/3/2018 Name Date Tdap 9/15/2015  8:56 PM  
  
 Not reviewed this visit You Were Diagnosed With   
  
 Codes Comments Encounter for immunization    -  Primary ICD-10-CM: B07 ICD-9-CM: V03.89 Screening for alcoholism     ICD-10-CM: Z13.89 ICD-9-CM: V79.1 Screening for depression     ICD-10-CM: Z13.89 ICD-9-CM: V79.0 Vitals BP Pulse Temp Resp Height(growth percentile) Weight(growth percentile) 128/56 (BP 1 Location: Left arm, BP Patient Position: At rest) 60 96.4 °F (35.8 °C) (Oral) 16 5' 7\" (1.702 m) 173 lb 6.4 oz (78.7 kg) SpO2 BMI Smoking Status 96% 27.16 kg/m2 Never Smoker BMI and BSA Data Body Mass Index Body Surface Area  
 27.16 kg/m 2 1.93 m 2 Preferred Pharmacy Pharmacy Name Phone [address] [facility] [address] [place] [address] [phone] Your Updated Medication List  
  
   
This list is accurate as of 7/3/18  8:56 AM.  Always use your most recent med list.  
  
  
  
  
 aspirin 81 mg tablet Take 81 mg by mouth daily. cholecalciferol 1,000 unit tablet Commonly known as:  VITAMIN D3 Take 1 Tab by mouth daily. COLACE 100 mg capsule Generic drug:  docusate sodium Take 100 mg by mouth daily. ibuprofen 200 mg tablet Commonly known as:  ADVIL Take 3 Tabs by mouth every eight (8) hours as needed for Pain. INCONTINENT BRIEF As needed  
  
 losartan-hydroCHLOROthiazide 100-25 mg per tablet Commonly known as:  HYZAAR Take 1 Tab by mouth daily. Indications: hypertension MULTIPLE VITAMIN PO Take 1 Tab by mouth daily. ondansetron 4 mg disintegrating tablet Commonly known as:  ZOFRAN ODT Take 1 Tab by mouth every eight (8) hours as needed for Nausea. psyllium Powd Commonly known as:  METAMUCIL One cup w/ 12 oz of water twice daily and hold for loose stool  
  
 rosuvastatin 20 mg tablet Commonly known as:  CRESTOR Take 1 Tab by mouth nightly. varicella zoster vaccine live 19,400 unit/0.65 mL Susr injection Commonly known as:  ZOSTAVAX  
1 Vial by SubCUTAneous route once for 1 dose. vitamin e 1,000 unit capsule Commonly known as:  E GEMS Take 1,000 Units by mouth daily. vitamins a & d Cap Prescriptions Printed Refills  
 varicella zoster vaccine live (ZOSTAVAX) 19,400 unit/0.65 mL susr injection 0 Si Vial by SubCUTAneous route once for 1 dose. Class: Print Route: SubCUTAneous We Performed the Following Renetta 68 [[id number] [facility]] OR ANNUAL ALCOHOL SCREEN 15 MIN [id number] [facility]] Follow-up Instructions Return in about 1 year (around 7/3/2019), or if symptoms worsen or fail to improve. Patient Instructions Well Visit, Over 72: Care Instructions Your Care Instructions Physical exams can help you stay healthy. Your doctor has checked your overall health and may have suggested ways to take good care of yourself. He or she also may have recommended tests. At home, you can help prevent illness with healthy eating, regular exercise, and other steps. Follow-up care is a key part of your treatment and safety. Be sure to make and go to all appointments, and call your doctor if you are having problems. It's also a good idea to know your test results and keep a list of the medicines you take. How can you care for yourself at home? · Reach and stay at a healthy weight. This will lower your risk for many problems, such as obesity, diabetes, heart disease, and high blood pressure. · Get at least 30 minutes of exercise on most days of the week. Walking is a good choice. You also may want to do other activities, such as running, swimming, cycling, or playing tennis or team sports. · Do not smoke. Smoking can make health problems worse. If you need help quitting, talk to your doctor about stop-smoking programs and medicines. These can increase your chances of quitting for good. · Protect your skin from too much sun. When you're outdoors from 10 a.m. to 4 p.m., stay in the shade or cover up with clothing and a hat with a wide brim. Wear sunglasses that block UV rays. Even when it's cloudy, put broad-spectrum sunscreen (SPF 30 or higher) on any exposed skin. · See a dentist one or two times a year for checkups and to have your teeth cleaned. · Wear a seat belt in the car. · Limit alcohol to 2 drinks a day for men and 1 drink a day for women. Too much alcohol can cause health problems. Follow your doctor's advice about when to have certain tests. These tests can spot problems early. For men and women · Cholesterol. Your doctor will tell you how often to have this done based on your overall health and other things that can increase your risk for heart attack and stroke. · Blood pressure. Have your blood pressure checked during a routine doctor visit.  Your doctor will tell you how often to check your blood pressure based on your age, your blood pressure results, and other factors. · Diabetes. Ask your doctor whether you should have tests for diabetes. · Vision. Experts recommend that you have yearly exams for glaucoma and other age-related eye problems. · Hearing. Tell your doctor if you notice any change in your hearing. You can have tests to find out how well you hear. · Colon cancer tests. Keep having colon cancer tests as your doctor recommends. You can have one of several types of tests. · Heart attack and stroke risk. At least every 4 to 6 years, you should have your risk for heart attack and stroke assessed. Your doctor uses factors such as your age, blood pressure, cholesterol, and whether you smoke or have diabetes to show what your risk for a heart attack or stroke is over the next 10 years. · Osteoporosis. Talk to your doctor about whether you should have a bone density test to find out whether you have thinning bones. Also ask your doctor about whether you should take calcium and vitamin D supplements. For women · Pap test and pelvic exam. You may no longer need a Pap test. Talk with your doctor about whether to stop or continue to have Pap tests. · Breast exam and mammogram. Ask how often you should have a mammogram, which is an X-ray of your breasts. A mammogram can spot breast cancer before it can be felt and when it is easiest to treat. · Thyroid disease. Talk to your doctor about whether to have your thyroid checked as part of a regular physical exam. Women have an increased chance of a thyroid problem. For men · Prostate exam. Talk to your doctor about whether you should have a blood test (called a PSA test) for prostate cancer. Experts disagree on whether men should have this test. Some experts recommend that you discuss the benefits and risks of the test with your doctor. · Abdominal aortic aneurysm.  Ask your doctor whether you should have a test to check for an aneurysm. You may need a test if you ever smoked or if your parent, brother, sister, or child has had an aneurysm. When should you call for help? Watch closely for changes in your health, and be sure to contact your doctor if you have any problems or symptoms that concern you. Where can you learn more? Go to http://julisa-davide.info/. Enter R873 in the search box to learn more about \"Well Visit, Over 65: Care Instructions. \" Current as of: May 12, 2017 Content Version: 11.4 © 6678-2240 [employer]. Care instructions adapted under license by [employer] (which disclaims liability or warranty for this information). If you have questions about a medical condition or this instruction, always ask your healthcare professional. [address] any warranty or liability for your use of this information. Body Mass Index: Care Instructions Your Care Instructions Body mass index (BMI) can help you see if your weight is raising your risk for health problems. It uses a formula to compare how much you weigh with how tall you are. · A BMI lower than 18.5 is considered underweight. · A BMI between 18.5 and 24.9 is considered healthy. · A BMI between 25 and 29.9 is considered overweight. A BMI of 30 or higher is considered obese. If your BMI is in the normal range, it means that you have a lower risk for weight-related health problems. If your BMI is in the overweight or obese range, you may be at increased risk for weight-related health problems, such as high blood pressure, heart disease, stroke, arthritis or joint pain, and diabetes. If your BMI is in the underweight range, you may be at increased risk for health problems such as fatigue, lower protection (immunity) against illness, muscle loss, bone loss, hair loss, and hormone problems. BMI is just one measure of your risk for weight-related health problems. You may be at higher risk for health problems if you are not active, you eat an unhealthy diet, or you drink too much alcohol or use tobacco products. Follow-up care is a key part of your treatment and safety. Be sure to make and go to all appointments, and call your doctor if you are having problems. It's also a good idea to know your test results and keep a list of the medicines you take. How can you care for yourself at home? · Practice healthy eating habits. This includes eating plenty of fruits, vegetables, whole grains, lean protein, and low-fat dairy. · If your doctor recommends it, get more exercise. Walking is a good choice. Bit by bit, increase the amount you walk every day. Try for at least 30 minutes on most days of the week. · Do not smoke. Smoking can increase your risk for health problems. If you need help quitting, talk to your doctor about stop-smoking programs and medicines. These can increase your chances of quitting for good. · Limit alcohol to 2 drinks a day for men and 1 drink a day for women. Too much alcohol can cause health problems. If you have a BMI higher than 25 · Your doctor may do other tests to check your risk for weight-related health problems. This may include measuring the distance around your waist. A waist measurement of more than 40 inches in men or 35 inches in women can increase the risk of weight-related health problems. · Talk with your doctor about steps you can take to stay healthy or improve your health. You may need to make lifestyle changes to lose weight and stay healthy, such as changing your diet and getting regular exercise. If you have a BMI lower than 18.5 · Your doctor may do other tests to check your risk for health problems. · Talk with your doctor about steps you can take to stay healthy or improve your health.  You may need to make lifestyle changes to gain or maintain weight and stay healthy, such as getting more healthy foods in your diet and doing exercises to build muscle. Where can you learn more? Go to http://julisa-davide.info/. Enter S176 in the search box to learn more about \"Body Mass Index: Care Instructions. \" Current as of: October 13, 2016 Content Version: 11.4 © 1827-7763 [employer]. Care instructions adapted under license by [employer] (which disclaims liability or warranty for this information). If you have questions about a medical condition or this instruction, always ask your healthcare professional. [address] any warranty or liability for your use of this information. Medicare Wellness Visit, Male The best way to live healthy is to have a lifestyle where you eat a well-balanced diet, exercise regularly, limit alcohol use, and quit all forms of tobacco/nicotine, if applicable. Regular preventive services are another way to keep healthy. Preventive services (vaccines, screening tests, monitoring & exams) can help personalize your care plan, which helps you manage your own care. Screening tests can find health problems at the earliest stages, when they are easiest to treat. [facility] follows the current, evidence-based guidelines published by the [place] Javier Vincenzo (USPSTF) when recommending preventive services for our patients. Because we follow these guidelines, sometimes recommendations change over time as research supports it. (For example, a prostate screening blood test is no longer routinely recommended for men with no symptoms.) Of course, you and your provider may decide to screen more often for some diseases, based on your risk and co-morbidities (chronic disease you are already diagnosed with). Preventive services for you include: - Medicare offers their members a free annual wellness visit, which is time for you and your primary care provider to discuss and plan for your preventive service needs. Take advantage of this benefit every year! 
 
-All people over age 72 should receive the recommended pneumonia vaccines. Current USPSTF guidelines recommend a series of two vaccines for the best pneumonia protection.  
 
-All adults should have a yearly flu vaccine and a tetanus vaccine every 10 years. All adults age 61 years should receive a shingles vaccine once in their lifetime.   
 
-All adults age 38-68 years who are overweight should have a diabetes screening test once every three years.  
 
-Other screening tests & preventive services for persons with diabetes include: an eye exam to screen for diabetic retinopathy, a kidney function test, a foot exam, and stricter control over your cholesterol.  
 
-Cardiovascular screening for adults with routine risk involves an electrocardiogram (ECG) at intervals determined by the provider.  
 
-Colorectal cancer screenings should be done for adults age 54-65 years with normal risk. There are a number of acceptable methods of screening for this type of cancer. Each test has its own benefits and drawbacks. Discuss with your provider what is most appropriate for you during your annual wellness visit. The different tests include: colonoscopy (considered the best screening method), a fecal occult blood test, a fecal DNA test, and sigmoidoscopy. 
 
-All adults born between [facility] should be screened once for Hepatitis C. 
 
-An Abdominal Aortic Aneurysm (AAA) Screening is recommended for men age 73-68 who has ever smoked in their lifetime. Here is a list of your current Health Maintenance items (your personalized list of preventive services) with a due date: 
Health Maintenance Due Topic Date Due  Shingles Vaccine  01/15/1999  Glaucoma Screening   02/28/2016 Introducing [facility] [place] [facility]! Dear Hannah Warner: Thank you for requesting a [employer] account.   Our records indicate that you already have an active [employer] account. You can access your account anytime at [email or web address] Did you know that you can access your hospital and ER discharge instructions at any time in [employer]? You can also review all of your test results from your hospital stay or ER visit. Additional Information If you have questions, please visit the Frequently Asked Questions section of the [employer] website at [email or web address] Remember, [employer] is NOT to be used for urgent needs. For medical emergencies, dial 911. Now available from your iPhone and Android! Please provide this summary of care documentation to your next provider. Your primary care clinician is listed as Emani Hubbard. If you have any questions after today's visit, please call [phone].

## 2018-07-03 NOTE — PATIENT INSTRUCTIONS
Well Visit, Over 72: Care Instructions  Your Care Instructions    Physical exams can help you stay healthy. Your doctor has checked your overall health and may have suggested ways to take good care of yourself. He or she also may have recommended tests. At home, you can help prevent illness with healthy eating, regular exercise, and other steps. Follow-up care is a key part of your treatment and safety. Be sure to make and go to all appointments, and call your doctor if you are having problems. It's also a good idea to know your test results and keep a list of the medicines you take. How can you care for yourself at home? · Reach and stay at a healthy weight. This will lower your risk for many problems, such as obesity, diabetes, heart disease, and high blood pressure. · Get at least 30 minutes of exercise on most days of the week. Walking is a good choice. You also may want to do other activities, such as running, swimming, cycling, or playing tennis or team sports. · Do not smoke. Smoking can make health problems worse. If you need help quitting, talk to your doctor about stop-smoking programs and medicines. These can increase your chances of quitting for good. · Protect your skin from too much sun. When you're outdoors from 10 a.m. to 4 p.m., stay in the shade or cover up with clothing and a hat with a wide brim. Wear sunglasses that block UV rays. Even when it's cloudy, put broad-spectrum sunscreen (SPF 30 or higher) on any exposed skin. · See a dentist one or two times a year for checkups and to have your teeth cleaned. · Wear a seat belt in the car. · Limit alcohol to 2 drinks a day for men and 1 drink a day for women. Too much alcohol can cause health problems. Follow your doctor's advice about when to have certain tests. These tests can spot problems early. For men and women  · Cholesterol.  Your doctor will tell you how often to have this done based on your overall health and other things that can increase your risk for heart attack and stroke. · Blood pressure. Have your blood pressure checked during a routine doctor visit. Your doctor will tell you how often to check your blood pressure based on your age, your blood pressure results, and other factors. · Diabetes. Ask your doctor whether you should have tests for diabetes. · Vision. Experts recommend that you have yearly exams for glaucoma and other age-related eye problems. · Hearing. Tell your doctor if you notice any change in your hearing. You can have tests to find out how well you hear. · Colon cancer tests. Keep having colon cancer tests as your doctor recommends. You can have one of several types of tests. · Heart attack and stroke risk. At least every 4 to 6 years, you should have your risk for heart attack and stroke assessed. Your doctor uses factors such as your age, blood pressure, cholesterol, and whether you smoke or have diabetes to show what your risk for a heart attack or stroke is over the next 10 years. · Osteoporosis. Talk to your doctor about whether you should have a bone density test to find out whether you have thinning bones. Also ask your doctor about whether you should take calcium and vitamin D supplements. For women  · Pap test and pelvic exam. You may no longer need a Pap test. Talk with your doctor about whether to stop or continue to have Pap tests. · Breast exam and mammogram. Ask how often you should have a mammogram, which is an X-ray of your breasts. A mammogram can spot breast cancer before it can be felt and when it is easiest to treat. · Thyroid disease. Talk to your doctor about whether to have your thyroid checked as part of a regular physical exam. Women have an increased chance of a thyroid problem. For men  · Prostate exam. Talk to your doctor about whether you should have a blood test (called a PSA test) for prostate cancer.  Experts disagree on whether men should have this test. Some experts recommend that you discuss the benefits and risks of the test with your doctor. · Abdominal aortic aneurysm. Ask your doctor whether you should have a test to check for an aneurysm. You may need a test if you ever smoked or if your parent, brother, sister, or child has had an aneurysm. When should you call for help? Watch closely for changes in your health, and be sure to contact your doctor if you have any problems or symptoms that concern you. Where can you learn more? Go to http://julisa-davide.info/. Enter C372 in the search box to learn more about \"Well Visit, Over 65: Care Instructions. \"  Current as of: May 12, 2017  Content Version: 11.4  © 3385-3217 [employer]. Care instructions adapted under license by [employer] (which disclaims liability or warranty for this information). If you have questions about a medical condition or this instruction, always ask your healthcare professional. [address] any warranty or liability for your use of this information. Body Mass Index: Care Instructions  Your Care Instructions    Body mass index (BMI) can help you see if your weight is raising your risk for health problems. It uses a formula to compare how much you weigh with how tall you are. · A BMI lower than 18.5 is considered underweight. · A BMI between 18.5 and 24.9 is considered healthy. · A BMI between 25 and 29.9 is considered overweight. A BMI of 30 or higher is considered obese. If your BMI is in the normal range, it means that you have a lower risk for weight-related health problems. If your BMI is in the overweight or obese range, you may be at increased risk for weight-related health problems, such as high blood pressure, heart disease, stroke, arthritis or joint pain, and diabetes.  If your BMI is in the underweight range, you may be at increased risk for health problems such as fatigue, lower protection (immunity) against illness, muscle loss, bone loss, hair loss, and hormone problems. BMI is just one measure of your risk for weight-related health problems. You may be at higher risk for health problems if you are not active, you eat an unhealthy diet, or you drink too much alcohol or use tobacco products. Follow-up care is a key part of your treatment and safety. Be sure to make and go to all appointments, and call your doctor if you are having problems. It's also a good idea to know your test results and keep a list of the medicines you take. How can you care for yourself at home? · Practice healthy eating habits. This includes eating plenty of fruits, vegetables, whole grains, lean protein, and low-fat dairy. · If your doctor recommends it, get more exercise. Walking is a good choice. Bit by bit, increase the amount you walk every day. Try for at least 30 minutes on most days of the week. · Do not smoke. Smoking can increase your risk for health problems. If you need help quitting, talk to your doctor about stop-smoking programs and medicines. These can increase your chances of quitting for good. · Limit alcohol to 2 drinks a day for men and 1 drink a day for women. Too much alcohol can cause health problems. If you have a BMI higher than 25  · Your doctor may do other tests to check your risk for weight-related health problems. This may include measuring the distance around your waist. A waist measurement of more than 40 inches in men or 35 inches in women can increase the risk of weight-related health problems. · Talk with your doctor about steps you can take to stay healthy or improve your health. You may need to make lifestyle changes to lose weight and stay healthy, such as changing your diet and getting regular exercise. If you have a BMI lower than 18.5  · Your doctor may do other tests to check your risk for health problems. · Talk with your doctor about steps you can take to stay healthy or improve your health. You may need to make lifestyle changes to gain or maintain weight and stay healthy, such as getting more healthy foods in your diet and doing exercises to build muscle. Where can you learn more? Go to http://julisa-davide.info/. Enter S176 in the search box to learn more about \"Body Mass Index: Care Instructions. \"  Current as of: October 13, 2016  Content Version: 11.4  © 1835-5502 [employer]. Care instructions adapted under license by [employer] (which disclaims liability or warranty for this information). If you have questions about a medical condition or this instruction, always ask your healthcare professional. [place] [address] any warranty or liability for your use of this information. Medicare Wellness Visit, Male    The best way to live healthy is to have a lifestyle where you eat a well-balanced diet, exercise regularly, limit alcohol use, and quit all forms of tobacco/nicotine, if applicable. Regular preventive services are another way to keep healthy. Preventive services (vaccines, screening tests, monitoring & exams) can help personalize your care plan, which helps you manage your own care. Screening tests can find health problems at the earliest stages, when they are easiest to treat. [facility] follows the current, evidence-based guidelines published by the [facility] and [employer] (USPSTF) when recommending preventive services for our patients. Because we follow these guidelines, sometimes recommendations change over time as research supports it. (For example, a prostate screening blood test is no longer routinely recommended for men with no symptoms.)    Of course, you and your provider may decide to screen more often for some diseases, based on your risk and co-morbidities (chronic disease you are already diagnosed with).      Preventive services for you include:    - Medicare offers their members a free annual wellness visit, which is time for you and your primary care provider to discuss and plan for your preventive service needs. Take advantage of this benefit every year!    -All people over age 72 should receive the recommended pneumonia vaccines. Current USPSTF guidelines recommend a series of two vaccines for the best pneumonia protection.     -All adults should have a yearly flu vaccine and a tetanus vaccine every 10 years. All adults age 61 years should receive a shingles vaccine once in their lifetime.      -All adults age 38-68 years who are overweight should have a diabetes screening test once every three years.     -Other screening tests & preventive services for persons with diabetes include: an eye exam to screen for diabetic retinopathy, a kidney function test, a foot exam, and stricter control over your cholesterol.     -Cardiovascular screening for adults with routine risk involves an electrocardiogram (ECG) at intervals determined by the provider.     -Colorectal cancer screenings should be done for adults age 54-65 years with normal risk. There are a number of acceptable methods of screening for this type of cancer. Each test has its own benefits and drawbacks. Discuss with your provider what is most appropriate for you during your annual wellness visit. The different tests include: colonoscopy (considered the best screening method), a fecal occult blood test, a fecal DNA test, and sigmoidoscopy.    -All adults born between [facility] should be screened once for Hepatitis C.    -An Abdominal Aortic Aneurysm (AAA) Screening is recommended for men age 73-68 who has ever smoked in their lifetime.      Here is a list of your current Health Maintenance items (your personalized list of preventive services) with a due date:  Health Maintenance Due   Topic Date Due    Shingles Vaccine  01/15/1999    Glaucoma Screening   02/28/2016

## 2018-07-03 NOTE — ACP (ADVANCE CARE PLANNING)
Advance Care Planning  Other Legally Authorized Decision Maker would be son Juvencio Chung [phone] as SDM     For My patients who has currently great Decision Making Capacity:     Patient is on no presence of family member stated that he wants to be DNR at this time,  Pt was given the form, he will sign and bring us a copy on the later date.

## 2018-07-03 NOTE — PROGRESS NOTES
Discussed the patient's BMI with him. The BMI follow up plan is as follows:     dietary management education, guidance, and counseling  encourage exercise  monitor weight  prescribed dietary intake    An After Visit Summary was printed and given to the patient. This is the Subsequent Medicare Annual Wellness Exam, performed 12 months or more after the Initial AWV or the last Subsequent AWV    I have reviewed the patient's medical history in detail and updated the computerized patient record. History   Present for CPE, last Complete Physical exam was >1 yr ago , not Up todate w/ all vaccination all offered but refusing, last tetanus vaccine was in <6yrs ago .    Last psa exam was normal and was in  >1 yrs ago,    last colonoscopy was normal and was  1 yrs  Ago,   No past surgical hx,  last bone dexa scan was normal and was 3 yr Ago,       No family hx of breast cancer in a male  no family hx of prostate cancer   no family hx of colon cancer, father 63yo from bile duct cancer mother 67yo from heart Dz , +++sexaully active and ++++ physically active,  compliant w/ meds, no Rf needed for today for her meds.        pt states that he had not been depressed no hx of fall nl interest to do things           Past Medical History:   Diagnosis Date    Acute bronchitis 3/16/2016    Back pain, chronic 11/7/2014    Breast lump on left side at 11 o'clock position 5/7/2015    CAD (coronary artery disease)     Cat scratch of right forearm with infection 6/30/2016    Chronic bronchitis ([facility] [address] 4/5/2016    Chronic idiopathic constipation 4/17/2017    Gastroesophageal reflux disease without esophagitis 4/17/2017    H/O asbestos exposure 4/5/2016    H/O asbestos exposure 4/5/2016    Hypercholesterolemia     Hypertension     Ketonuria 4/17/2017    Need for diphtheria-tetanus-pertussis (Tdap) vaccine, adult/adolescent 4/4/2014    Need for pneumococcal vaccination 4/4/2014    Needs flu shot 4/4/2014    S/P CABG x 3 2001    Swelling of right hand 7/7/2016    T12 compression fracture ([address] 11/10/2014      Past Surgical History:   Procedure Laterality Date    CARDIAC SURG PROCEDURE UNLIST      CABG    CARDIAC SURG PROCEDURE UNLIST  2000    bypass     Current Outpatient Prescriptions   Medication Sig Dispense Refill    COLACE 100 mg capsule Take 100 mg by mouth daily.  diaper,brief,adult, disposable (INCONTINENT BRIEF) As needed      rosuvastatin (CRESTOR) 20 mg tablet Take 1 Tab by mouth nightly. 90 Tab 2    losartan-hydroCHLOROthiazide (HYZAAR) 100-25 mg per tablet Take 1 Tab by mouth daily. Indications: hypertension 90 Tab 3    ibuprofen (ADVIL) 200 mg tablet Take 3 Tabs by mouth every eight (8) hours as needed for Pain. 40 Tab 0    cholecalciferol (VITAMIN D3) 1,000 unit tablet Take 1 Tab by mouth daily. 30 Tab 11    aspirin 81 mg tablet Take 81 mg by mouth daily.  MULTIVITAMIN (MULTIPLE VITAMIN PO) Take 1 Tab by mouth daily.  vitamins a & d cap       psyllium (METAMUCIL) powd One cup w/ 12 oz of water twice daily and hold for loose stool (Patient not taking: Reported on 1/3/2018) 660 g 6    ondansetron (ZOFRAN ODT) 4 mg disintegrating tablet Take 1 Tab by mouth every eight (8) hours as needed for Nausea. (Patient not taking: Reported on 1/3/2018) 10 Tab 0    vitamin e (E GEMS) 1,000 unit capsule Take 1,000 Units by mouth daily.        Allergies   Allergen Reactions    Ace Inhibitors Cough     Family History   Problem Relation Age of Onset    Heart Disease Mother     Cancer Father      Social History   Substance Use Topics    Smoking status: Never Smoker    Smokeless tobacco: Never Used    Alcohol use 0.0 oz/week     0 Standard drinks or equivalent per week      Comment: occasionally     Patient Active Problem List   Diagnosis Code    Coronary atherosclerosis of artery bypass graft I25.810    CAD (coronary artery disease) I25.10    S/P CABG x 3 Z95.1    Chronic ischemic heart disease I25.9    Hypercholesterolemia E78.00    Urinary frequency R35.0    HTN, goal below 130/80 I10    Macrocytic anemia D53.9    Prediabetes R73.03    Hyperbilirubinemia E80.6    Elevated PSA R97.20    Need for diphtheria-tetanus-pertussis (Tdap) vaccine, adult/adolescent Z23    Needs flu shot Z23    Need for pneumococcal vaccination Z23    Back pain, chronic M54.9, G89.29    T12 compression fracture (HCC) S22.080A    Breast lump on left side at 11 o'clock position N63.22    ASHD (arteriosclerotic heart disease) I25.10    Acute bronchitis J20.9    Chronic bronchitis (HCC) J42    H/O asbestos exposure Z77.090    Cough due to ACE inhibitor R05, T46.4X5A    Cat scratch of right forearm with infection S50.811A, L08.9, W55.03XA    Swelling of right hand M79.89    Chronic idiopathic constipation K59.04    Gastroesophageal reflux disease without esophagitis K21.9    Ketonuria R82.4       Depression Risk Factor Screening:     PHQ over the last two weeks 7/3/2018   Little interest or pleasure in doing things Not at all   Feeling down, depressed or hopeless Not at all   Total Score PHQ 2 0     Alcohol Risk Factor Screening: You do not drink alcohol or very rarely. Functional Ability and Level of Safety:   Hearing Loss  Hearing is good. Activities of Daily Living  The home contains: handrails, grab bars and rugs  Patient does total self care    Fall Risk  Fall Risk Assessment, last 12 mths 7/3/2018   Able to walk? Yes   Fall in past 12 months?  No       Abuse Screen  Patient is not abused    Cognitive Screening   Evaluation of Cognitive Function:  Has your family/caregiver stated any concerns about your memory: no  Normal    Patient Care Team   Patient Care Team:  Germaine Christopher MD as PCP - General (Family Practice)  Pita Dalton MD as Physician (Cardiology)    Assessment/Plan   Education and counseling provided:  Are appropriate based on today's review and evaluation  End-of-Life planning (with patient's consent)    Diagnoses and all orders for this visit:    1. Encounter for immunization  -     varicella zoster vaccine live (ZOSTAVAX) 19,400 unit/0.65 mL susr injection; 1 Vial by SubCUTAneous route once for 1 dose. 2. Screening for alcoholism  -     Annual  Alcohol Screen 15 min ()    3.  Screening for depression  -     Depression Screen Annual        Health Maintenance Due   Topic Date Due    ZOSTER VACCINE AGE 60>  01/15/1999    GLAUCOMA SCREENING Q2Y  02/28/2016

## 2018-07-03 NOTE — PROGRESS NOTES
Name and  verified        Chief Complaint   Patient presents with   [address] Maintenance reviewed-discussed with patient. Advance Directives Care Planning Information given, patient acknowledged understanding. 1. Have you been to the ER, urgent care clinic since your last visit? Hospitalized since your last visit? No    2. Have you seen or consulted any other health care providers outside of the [address] [place] [address] since your last visit? Include any pap smears or colon screening.  No

## 2018-08-20 ENCOUNTER — OFFICE VISIT (OUTPATIENT)
Dept: CARDIOLOGY CLINIC | Age: 78
End: 2018-08-20

## 2018-08-20 VITALS
RESPIRATION RATE: 16 BRPM | WEIGHT: 174.3 LBS | BODY MASS INDEX: 27.36 KG/M2 | SYSTOLIC BLOOD PRESSURE: 132 MMHG | DIASTOLIC BLOOD PRESSURE: 58 MMHG | HEIGHT: 67 IN | HEART RATE: 63 BPM | OXYGEN SATURATION: 97 %

## 2018-08-20 DIAGNOSIS — K21.9 GASTROESOPHAGEAL REFLUX DISEASE WITHOUT ESOPHAGITIS: ICD-10-CM

## 2018-08-20 DIAGNOSIS — T46.4X5A COUGH DUE TO ACE INHIBITOR: ICD-10-CM

## 2018-08-20 DIAGNOSIS — Z77.090 H/O ASBESTOS EXPOSURE: ICD-10-CM

## 2018-08-20 DIAGNOSIS — I25.9 CHRONIC ISCHEMIC HEART DISEASE: Primary | ICD-10-CM

## 2018-08-20 DIAGNOSIS — E78.00 HYPERCHOLESTEROLEMIA: ICD-10-CM

## 2018-08-20 DIAGNOSIS — R05.8 COUGH DUE TO ACE INHIBITOR: ICD-10-CM

## 2018-08-20 DIAGNOSIS — Z95.1 S/P CABG X 3: ICD-10-CM

## 2018-08-20 NOTE — PROGRESS NOTES
[address] [facility]        [phone]                             NEW PATIENT HPI/FOLLOW-UP    NAME:  Bird Lincoln Sr.   :   1940   MRN:   [id number]   PCP:  Tom Daniel MD           Subjective: The patient is a 77y.o. year old male  who returns for a routine follow-up. Since the last visit, patient reports no new symptoms. Lamenting that wife has progressive Alzheimers. Was palced in SNF. Hardly recognizes him--broke down and cried when revealing this. Denies change in exercise tolerance, chest pain, edema, medication intolerance, palpitations, shortness of breath, PND/orthopnea wheezing, sputum, syncope, dizziness or light headedness. Doing satisfactorily. Review of Systems  General: Pt denies excessive weight gain or loss. Pt is able to conduct ADL's. Respiratory: Denies shortness of breath, SINGH, wheezing or stridor.   Cardiovascular: Denies precordial pain, palpitations, edema or PND  Gastrointestinal: Denies poor appetite, indigestion, abdominal pain or blood in stool  Peripheral vascular: Denies claudication, leg cramps  Neurological: Denies paresthesias, tingling.numbness  Psychiatric: Denies anxiety,depression,fatigue  Musculoskeletal: Denies pain,tenderness, soreness,swelling      Past Medical History:   Diagnosis Date    Acute bronchitis 3/16/2016    Back pain, chronic 2014    Breast lump on left side at 11 o'clock position 2015    CAD (coronary artery disease)     Cat scratch of right forearm with infection 2016    Chronic bronchitis ([address] 2016    Chronic idiopathic constipation 2017    Gastroesophageal reflux disease without esophagitis 2017    H/O asbestos exposure 2016    H/O asbestos exposure 2016    Hypercholesterolemia     Hypertension     Ketonuria 2017    Need for diphtheria-tetanus-pertussis (Tdap) vaccine, adult/adolescent 2014    Need for pneumococcal vaccination 2014  Needs flu shot 4/4/2014    S/P CABG x 3     2001    Swelling of right hand 7/7/2016    T12 compression fracture ([facility] [address] 11/10/2014     Patient Active Problem List    Diagnosis Date Noted    Chronic idiopathic constipation 04/17/2017    Gastroesophageal reflux disease without esophagitis 04/17/2017    Ketonuria 04/17/2017    Swelling of right hand 07/07/2016    Cat scratch of right forearm with infection 06/30/2016    Cough due to ACE inhibitor 04/25/2016    Chronic bronchitis ([facility] [facility] 04/05/2016    H/O asbestos exposure 04/05/2016    Acute bronchitis 03/16/2016    ASHD (arteriosclerotic heart disease) 10/19/2015    Breast lump on left side at 11 o'clock position 05/07/2015    T12 compression fracture (HCC) 11/10/2014    Back pain, chronic 11/07/2014    Need for diphtheria-tetanus-pertussis (Tdap) vaccine, adult/adolescent 04/04/2014    Needs flu shot 04/04/2014    Need for pneumococcal vaccination 04/04/2014    Macrocytic anemia 01/30/2013    Prediabetes 01/30/2013    Hyperbilirubinemia 01/30/2013    Elevated PSA 01/30/2013    Hypercholesterolemia 01/14/2013    Urinary frequency 01/14/2013    HTN, goal below 130/80 01/14/2013    Coronary atherosclerosis of artery bypass graft 12/24/2012    Chronic ischemic heart disease 12/24/2012    CAD (coronary artery disease)     S/P CABG x 3       Past Surgical History:   Procedure Laterality Date    CARDIAC SURG PROCEDURE UNLIST      CABG    CARDIAC SURG PROCEDURE UNLIST  2000    bypass     Allergies   Allergen Reactions    Ace Inhibitors Cough      Family History   Problem Relation Age of Onset    Heart Disease Mother     Cancer Father       Social History     Social History    Marital status:      Spouse name: N/A    Number of children: N/A    Years of education: N/A     Occupational History    Not on file.      Social History Main Topics    Smoking status: Never Smoker    Smokeless tobacco: Never Used    Alcohol use 0.0 oz/week 0 Standard drinks or equivalent per week      Comment: occasionally    Drug use: No    Sexual activity: Not on file     Other Topics Concern    Not on file     Social History Narrative      Current Outpatient Prescriptions   Medication Sig    diaper,brief,adult, disposable (INCONTINENT BRIEF) As needed    rosuvastatin (CRESTOR) 20 mg tablet Take 1 Tab by mouth nightly.  losartan-hydroCHLOROthiazide (HYZAAR) 100-25 mg per tablet Take 1 Tab by mouth daily. Indications: hypertension    ibuprofen (ADVIL) 200 mg tablet Take 3 Tabs by mouth every eight (8) hours as needed for Pain.  cholecalciferol (VITAMIN D3) 1,000 unit tablet Take 1 Tab by mouth daily.  aspirin 81 mg tablet Take 81 mg by mouth daily.  COLACE 100 mg capsule Take 100 mg by mouth daily.  vitamins a & d cap     psyllium (METAMUCIL) powd One cup w/ 12 oz of water twice daily and hold for loose stool (Patient not taking: Reported on 1/3/2018)    ondansetron (ZOFRAN ODT) 4 mg disintegrating tablet Take 1 Tab by mouth every eight (8) hours as needed for Nausea. (Patient not taking: Reported on 1/3/2018)    vitamin e (E GEMS) 1,000 unit capsule Take 1,000 Units by mouth daily.  MULTIVITAMIN (MULTIPLE VITAMIN PO) Take 1 Tab by mouth daily. No current facility-administered medications for this visit. I have reviewed the nurses notes, vitals, problem list, allergy list, medical history, family medical, social history and medications. Objective:     Physical Exam:     Vitals:    08/20/18 1018 08/20/18 1027   BP: 122/58 132/58   Pulse: 63    Resp: 16    SpO2: 97%    Weight: 174 lb 4.8 oz (79.1 kg)    Height: 5' 7\" (1.702 m)     Body mass index is 27.3 kg/(m^2). General: Well developed, in no acute distress. HEENT: No carotid bruits, no JVD, trach is midline. Heart:  Normal S1/S2 negative S3 or S4.  Regular, no murmur, gallop or rub.   Respiratory: Clear bilaterally, no wheezing or rales  Abdomen:   Soft, non-tender, bowel sounds are active.   Extremities:  No edema, normal cap refill, no cyanosis. Neuro: A&Ox3, speech clear, gait stable. Skin: Skin color is normal. No rashes or lesions. No diaphoresis. Vascular: 2+ pulses symmetric in all extremities        Data Review:       Cardiographics:    EKG: NSR, incomplete RBBB, PVC    Cardiology Labs:    Results for orders placed or performed during the hospital encounter of 02/09/15   EKG, 12 LEAD, INITIAL   Result Value Ref Range    Ventricular Rate 64 BPM    Atrial Rate 64 BPM    P-R Interval 140 ms    QRS Duration 88 ms    Q-T Interval 408 ms    QTC Calculation (Bezet) 420 ms    Calculated P Axis 45 degrees    Calculated R Axis 70 degrees    Calculated T Axis 67 degrees    Diagnosis       Normal sinus rhythm  RSR' or QR pattern in V1 suggests right ventricular conduction delay    When compared with ECG of 31-DEC-2012 17:42,  No significant change was found  Confirmed by Garrett Quinones ([id number]) on 2/9/2015 3:53:36 PM         Lab Results   Component Value Date/Time    Cholesterol, total 128 06/20/2018 09:01 AM    HDL Cholesterol 64 06/20/2018 09:01 AM    LDL, calculated 52 06/20/2018 09:01 AM    Triglyceride 58 06/20/2018 09:01 AM       Lab Results   Component Value Date/Time    Sodium 145 (H) 06/20/2018 09:01 AM    Potassium 4.5 06/20/2018 09:01 AM    Chloride 104 06/20/2018 09:01 AM    CO2 26 06/20/2018 09:01 AM    Anion gap 7 03/27/2017 07:10 PM    Glucose 90 06/20/2018 09:01 AM    BUN 23 06/20/2018 09:01 AM    Creatinine 1.22 06/20/2018 09:01 AM    BUN/Creatinine ratio 19 06/20/2018 09:01 AM    GFR est AA 65 06/20/2018 09:01 AM    GFR est non-AA 56 (L) 06/20/2018 09:01 AM    Calcium 9.4 06/20/2018 09:01 AM    Bilirubin, total 0.7 06/20/2018 09:01 AM    AST (SGOT) 20 06/20/2018 09:01 AM    Alk.  phosphatase 43 06/20/2018 09:01 AM    Protein, total 6.2 06/20/2018 09:01 AM    Albumin 3.9 06/20/2018 09:01 AM    Globulin 3.4 03/27/2017 07:10 PM    A-G Ratio 1.7 06/20/2018 09:01 AM    ALT (SGPT) 17 06/20/2018 09:01 AM          Assessment:       ICD-10-CM ICD-9-CM    1. Chronic ischemic heart disease I25.9 414.9 AMB POC EKG ROUTINE W/ 12 LEADS, INTER & REP   2. Hypercholesterolemia E78.00 272.0    3. Gastroesophageal reflux disease without esophagitis K21.9 530.81    4. Cough due to ACE inhibitor R05 786.2     T46.4X5A E942.6    5. H/O asbestos exposure Z77.090 V15.84    6. S/P CABG x 3 Z95.1 V45.81          Discussion: Patient presents at this time stable from a cardiac perspective. No cardiac complaints. Stress abounds--wife with severe progressive Alzheimer's in SNF. Pleased with present cardiac status. Plan: 1. Continue same meds. Lipid profile and labs followed by PCP. 2.Encouraged to exercise to tolerance and follow low fat, low cholesterol, low sodium predominantly Plant-based (consider Mediterranean) diet. Call with questions or concerns. Will follow up any test results by phone and/or f/u here in office if needed. Son Alatorre 3.Follow up: 1 YEAR    I have discussed the diagnosis with the patient and the intended plan as seen in the above orders. The patient has received an after-visit summary and questions were answered concerning future plans. I have discussed any concerning medication side effects and warnings with the patient as well.     Thanh Gomez MD  8/20/2018

## 2018-08-20 NOTE — PROGRESS NOTES
1. Have you been to the ER, urgent care clinic since your last visit? Hospitalized since your last visit? NO.    2. Have you seen or consulted any other health care providers outside of the [facility] since your last visit? Include any pap smears or colon screening. NO.       Chief Complaint   Patient presents with    Other     LAST SEEN IN 2016- CHEST DISCOMFORT IN MIDDLE OF CHEST FOR 2 MONTHS

## 2018-08-20 NOTE — MR AVS SNAPSHOT
[facility] [address] 
[phone] Patient: Tino Brewer. MRN: [id number] VEI:[birth year] Visit Information Date & Time Provider Department Dept. Phone Encounter #  
 8/20/2018 10:30 AM Jaz Coello, [facility] [phone] [id number] Your Appointments 8/20/2018 10:30 AM  
ESTABLISHED PATIENT with MD Noemi Mathews Cardiology Associate [facility] [address]) Appt Note: overdue for check up [address] [facility] [address] [facility] [address]  
[phone]  
  
   
 [address] [address] Upcoming Health Maintenance Date Due ZOSTER VACCINE AGE 60> 1/15/1999 GLAUCOMA SCREENING Q2Y 2/28/2016 Influenza Age 5 to Adult 8/1/2018 MEDICARE YEARLY EXAM 7/4/2019 COLONOSCOPY 4/22/2024 DTaP/Tdap/Td series (2 - Td) 9/15/2025 Allergies as of 8/20/2018  Review Complete On: 7/3/2018 By: Brionna Angel MD  
  
 Severity Noted Reaction Type Reactions Ace Inhibitors  04/25/2016    Cough Current Immunizations  Reviewed on 1/3/2018 Name Date Tdap 9/15/2015  8:56 PM  
  
 Not reviewed this visit Vitals Smoking Status Never Smoker Preferred Pharmacy Pharmacy Name Phone [address] [address] [phone] Your Updated Medication List  
  
   
This list is accurate as of 8/20/18 10:11 AM.  Always use your most recent med list.  
  
  
  
  
 aspirin 81 mg tablet Take 81 mg by mouth daily. cholecalciferol 1,000 unit tablet Commonly known as:  VITAMIN D3 Take 1 Tab by mouth daily. COLACE 100 mg capsule Generic drug:  docusate sodium Take 100 mg by mouth daily. ibuprofen 200 mg tablet Commonly known as:  ADVIL Take 3 Tabs by mouth every eight (8) hours as needed for Pain.   
  
 INCONTINENT BRIEF  
 As needed  
  
 losartan-hydroCHLOROthiazide 100-25 mg per tablet Commonly known as:  HYZAAR Take 1 Tab by mouth daily. Indications: hypertension MULTIPLE VITAMIN PO Take 1 Tab by mouth daily. ondansetron 4 mg disintegrating tablet Commonly known as:  ZOFRAN ODT Take 1 Tab by mouth every eight (8) hours as needed for Nausea. psyllium Powd Commonly known as:  METAMUCIL One cup w/ 12 oz of water twice daily and hold for loose stool  
  
 rosuvastatin 20 mg tablet Commonly known as:  CRESTOR Take 1 Tab by mouth nightly. vitamin e 1,000 unit capsule Commonly known as:  E GEMS Take 1,000 Units by mouth daily. vitamins a & d Cap Introducing [facility]! Dear Maria Eugenia Cheeny: Thank you for requesting a [employer] account. Our records indicate that you already have an active [employer] account. You can access your account anytime at [email or web address] Did you know that you can access your hospital and ER discharge instructions at any time in [employer]? You can also review all of your test results from your hospital stay or ER visit. Additional Information If you have questions, please visit the Frequently Asked Questions section of the [employer] website at [email or web address] Remember, [employer] is NOT to be used for urgent needs. For medical emergencies, dial 911. Now available from your iPhone and Android! Please provide this summary of care documentation to your next provider. Your primary care clinician is listed as Aaron Sands. If you have any questions after today's visit, please call [phone].

## 2018-10-15 RX ORDER — ROSUVASTATIN CALCIUM 20 MG/1
TABLET, COATED ORAL
Qty: 90 TAB | Refills: 2 | Status: SHIPPED | OUTPATIENT
Start: 2018-10-15 | End: 2018-12-11 | Stop reason: SDUPTHER

## 2018-10-17 ENCOUNTER — OFFICE VISIT (OUTPATIENT)
Dept: CARDIOLOGY CLINIC | Age: 78
End: 2018-10-17

## 2018-10-17 ENCOUNTER — TELEPHONE (OUTPATIENT)
Dept: CARDIOLOGY CLINIC | Age: 78
End: 2018-10-17

## 2018-10-17 VITALS
DIASTOLIC BLOOD PRESSURE: 72 MMHG | SYSTOLIC BLOOD PRESSURE: 140 MMHG | HEIGHT: 67 IN | RESPIRATION RATE: 18 BRPM | OXYGEN SATURATION: 96 % | BODY MASS INDEX: 26.9 KG/M2 | HEART RATE: 60 BPM | WEIGHT: 171.4 LBS

## 2018-10-17 DIAGNOSIS — I25.9 CHRONIC ISCHEMIC HEART DISEASE: ICD-10-CM

## 2018-10-17 DIAGNOSIS — R06.09 DOE (DYSPNEA ON EXERTION): ICD-10-CM

## 2018-10-17 DIAGNOSIS — F32.A ANXIETY AND DEPRESSION: ICD-10-CM

## 2018-10-17 DIAGNOSIS — K21.9 GASTROESOPHAGEAL REFLUX DISEASE WITHOUT ESOPHAGITIS: ICD-10-CM

## 2018-10-17 DIAGNOSIS — R73.03 PREDIABETES: ICD-10-CM

## 2018-10-17 DIAGNOSIS — R07.9 CHEST PAIN AT REST: Primary | ICD-10-CM

## 2018-10-17 DIAGNOSIS — S22.080A T12 COMPRESSION FRACTURE (HCC): ICD-10-CM

## 2018-10-17 DIAGNOSIS — E78.00 HYPERCHOLESTEROLEMIA: ICD-10-CM

## 2018-10-17 DIAGNOSIS — Z95.1 S/P CABG X 3: ICD-10-CM

## 2018-10-17 DIAGNOSIS — F41.9 ANXIETY AND DEPRESSION: ICD-10-CM

## 2018-10-17 NOTE — PROGRESS NOTES
[address] [place] [address]        [phone]                             NEW PATIENT HPI/FOLLOW-UP    NAME:  Gino Clark Sr.   :   1940   MRN:   [id number]   PCP:  Iban Cervantes MD           Subjective: The patient is a 77y.o. year old male  who returns prematurely for a routine follow-up to evaluate new onset progressive rest and exertional epigastric and substernal chest ingestion/burning and SINGH over last 4 weeks or more during and since slow demise and death of spouse due to Alzheimers after 61 yrs of marriage. Describes pain as mild-moderate, burning indigestion which comes and goes anytime and slightly worse with exertion though not always. Not necessarily worse at night. Has tried antacids with mixed response. Does not have NTG to use. Has a bit more SOB he believes on exertion. Denies change in exercise tolerance, edema, medication intolerance, palpitations,PND/orthopnea wheezing, sputum, syncope, dizziness or light headedness. Is here with Son. Review of Systems  General: Pt denies excessive weight gain or loss. Pt is able to conduct ADL's. Respiratory: +SINGH, -heezing or stridor.   Cardiovascular: + epigastric and substernal burning pain, -palpitations, edema or PND  Gastrointestinal: Denies poor appetite,+ indigestion, abdominal pain or blood in stool  Peripheral vascular: Denies claudication, leg cramps  Neurological: Denies paresthesias, tingling.numbness  Psychiatric: ++anxiety,depression,fatigue  Musculoskeletal: Denies pain,tenderness, soreness,swelling      Past Medical History:   Diagnosis Date    Acute bronchitis 3/16/2016    Back pain, chronic 2014    Breast lump on left side at 11 o'clock position 2015    CAD (coronary artery disease)     Cat scratch of right forearm with infection 2016    Chronic bronchitis (HCC) 2016    Chronic idiopathic constipation 2017    Gastroesophageal reflux disease without esophagitis 4/17/2017    H/O asbestos exposure 4/5/2016    H/O asbestos exposure 4/5/2016    Hypercholesterolemia     Hypertension     Ketonuria 4/17/2017    Need for diphtheria-tetanus-pertussis (Tdap) vaccine, adult/adolescent 4/4/2014    Need for pneumococcal vaccination 4/4/2014    Needs flu shot 4/4/2014    S/P CABG x 3     2001    Swelling of right hand 7/7/2016    T12 compression fracture ([address] 11/10/2014     Patient Active Problem List    Diagnosis Date Noted    Chest pain at rest 10/17/2018    Chronic idiopathic constipation 04/17/2017    Gastroesophageal reflux disease without esophagitis 04/17/2017    Ketonuria 04/17/2017    Swelling of right hand 07/07/2016    Cat scratch of right forearm with infection 06/30/2016    Cough due to ACE inhibitor 04/25/2016    Chronic bronchitis ([address] 04/05/2016    H/O asbestos exposure 04/05/2016    Acute bronchitis 03/16/2016    ASHD (arteriosclerotic heart disease) 10/19/2015    Breast lump on left side at 11 o'clock position 05/07/2015    T12 compression fracture ([address] 11/10/2014    Back pain, chronic 11/07/2014    Need for diphtheria-tetanus-pertussis (Tdap) vaccine, adult/adolescent 04/04/2014    Needs flu shot 04/04/2014    Need for pneumococcal vaccination 04/04/2014    Macrocytic anemia 01/30/2013    Prediabetes 01/30/2013    Hyperbilirubinemia 01/30/2013    Elevated PSA 01/30/2013    Hypercholesterolemia 01/14/2013    Urinary frequency 01/14/2013    HTN, goal below 130/80 01/14/2013    Coronary atherosclerosis of artery bypass graft 12/24/2012    Chronic ischemic heart disease 12/24/2012    CAD (coronary artery disease)     S/P CABG x 3       Past Surgical History:   Procedure Laterality Date    CARDIAC SURG PROCEDURE UNLIST      CABG    CARDIAC SURG PROCEDURE UNLIST  2000    bypass     Allergies   Allergen Reactions    Ace Inhibitors Cough      Family History   Problem Relation Age of Onset    Heart Disease Mother    [facility] Cancer Father       Social History     Socioeconomic History    Marital status:      Spouse name: Not on file    Number of children: Not on file    Years of education: Not on file    Highest education level: Not on file   Social Needs    Financial resource strain: Not on file    Food insecurity - worry: Not on file    Food insecurity - inability: Not on file    Transportation needs - medical: Not on file   [employer]ingua[employer] needs - non-medical: Not on file   Occupational History    Not on file   Tobacco Use    Smoking status: Never Smoker    Smokeless tobacco: Never Used   Substance and Sexual Activity    Alcohol use: Yes     Alcohol/week: 0.0 oz     Comment: occasionally    Drug use: No    Sexual activity: Not on file   Other Topics Concern    Not on file   Social History Narrative    Not on file      Current Outpatient Medications   Medication Sig    rosuvastatin (CRESTOR) 20 mg tablet TAKE 1 TABLET EVERY NIGHT    diaper,brief,adult, disposable (INCONTINENT BRIEF) As needed    losartan-hydroCHLOROthiazide (HYZAAR) 100-25 mg per tablet Take 1 Tab by mouth daily. Indications: hypertension    ibuprofen (ADVIL) 200 mg tablet Take 3 Tabs by mouth every eight (8) hours as needed for Pain.  cholecalciferol (VITAMIN D3) 1,000 unit tablet Take 1 Tab by mouth daily.  aspirin 81 mg tablet Take 81 mg by mouth daily.  COLACE 100 mg capsule Take 100 mg by mouth daily.  vitamins a & d cap     psyllium (METAMUCIL) powd One cup w/ 12 oz of water twice daily and hold for loose stool (Patient not taking: Reported on 1/3/2018)    ondansetron (ZOFRAN ODT) 4 mg disintegrating tablet Take 1 Tab by mouth every eight (8) hours as needed for Nausea. (Patient not taking: Reported on 1/3/2018)    vitamin e (E GEMS) 1,000 unit capsule Take 1,000 Units by mouth daily.  MULTIVITAMIN (MULTIPLE VITAMIN PO) Take 1 Tab by mouth daily. No current facility-administered medications for this visit. I have reviewed the nurses notes, vitals, problem list, allergy list, medical history, family medical, social history and medications. Objective:     Physical Exam:     Vitals:    10/17/18 1411 10/17/18 1417   BP: 136/70 140/72   Pulse: 60    Resp: 18    SpO2: 96%    Weight: 171 lb 6.4 oz (77.7 kg)    Height: 5' 7\" (1.702 m)     Body mass index is 26.85 kg/m². General: Well developed, in no acute distress. HEENT: No carotid bruits, no JVD, trach is midline. Heart:  Normal S1/S2 negative S3 or S4. Regular, no murmur, gallop or rub.   Respiratory: Clear bilaterally, no wheezing or rales  Abdomen:   Soft, non-tender, bowel sounds are active.   Extremities:  No edema, normal cap refill, no cyanosis. Neuro: A&Ox3, speech clear, gait stable. Skin: Skin color is normal. No rashes or lesions. No diaphoresis.   Vascular: 2+ pulses symmetric in all extremities        Data Review:       Cardiographics:    EKG: SB, LAE,incomplete RBBB    Cardiology Labs:    Results for orders placed or performed during the hospital encounter of 02/09/15   EKG, 12 LEAD, INITIAL   Result Value Ref Range    Ventricular Rate 64 BPM    Atrial Rate 64 BPM    P-R Interval 140 ms    QRS Duration 88 ms    Q-T Interval 408 ms    QTC Calculation (Bezet) 420 ms    Calculated P Axis 45 degrees    Calculated R Axis 70 degrees    Calculated T Axis 67 degrees    Diagnosis       Normal sinus rhythm  RSR' or QR pattern in V1 suggests right ventricular conduction delay    When compared with ECG of 31-DEC-2012 17:42,  No significant change was found  Confirmed by Amada Weber ([id number]) on 2/9/2015 3:53:36 PM         Lab Results   Component Value Date/Time    Cholesterol, total 128 06/20/2018 09:01 AM    HDL Cholesterol 64 06/20/2018 09:01 AM    LDL, calculated 52 06/20/2018 09:01 AM    Triglyceride 58 06/20/2018 09:01 AM       Lab Results   Component Value Date/Time    Sodium 145 (H) 06/20/2018 09:01 AM    Potassium 4.5 06/20/2018 09:01 AM Chloride 104 06/20/2018 09:01 AM    CO2 26 06/20/2018 09:01 AM    Anion gap 7 03/27/2017 07:10 PM    Glucose 90 06/20/2018 09:01 AM    BUN 23 06/20/2018 09:01 AM    Creatinine 1.22 06/20/2018 09:01 AM    BUN/Creatinine ratio 19 06/20/2018 09:01 AM    GFR est AA 65 06/20/2018 09:01 AM    GFR est non-AA 56 (L) 06/20/2018 09:01 AM    Calcium 9.4 06/20/2018 09:01 AM    Bilirubin, total 0.7 06/20/2018 09:01 AM    AST (SGOT) 20 06/20/2018 09:01 AM    Alk. phosphatase 43 06/20/2018 09:01 AM    Protein, total 6.2 06/20/2018 09:01 AM    Albumin 3.9 06/20/2018 09:01 AM    Globulin 3.4 03/27/2017 07:10 PM    A-G Ratio 1.7 06/20/2018 09:01 AM    ALT (SGPT) 17 06/20/2018 09:01 AM          Assessment:       ICD-10-CM ICD-9-CM    1. Chest pain at rest R07.9 786.50 AMB POC EKG ROUTINE W/ 12 LEADS, INTER & REP      NUCLEAR STRESS TEST      NM CARDIAC SPECT W STRS/REST MULT      2D ECHO COMPLETE ADULT (TTE) W OR WO CONTR   2. T12 compression fracture ([address] S22.080A 805.2    3. S/P CABG x 3 Z95.1 V45.81 NUCLEAR STRESS TEST      NM CARDIAC SPECT W STRS/REST MULT      2D ECHO COMPLETE ADULT (TTE) W OR WO CONTR   4. Prediabetes R73.03 790.29 NUCLEAR STRESS TEST      NM CARDIAC SPECT W STRS/REST MULT      2D ECHO COMPLETE ADULT (TTE) W OR WO CONTR   5. Hypercholesterolemia E78.00 272.0 NUCLEAR STRESS TEST      NM CARDIAC SPECT W STRS/REST MULT      2D ECHO COMPLETE ADULT (TTE) W OR WO CONTR   6. Gastroesophageal reflux disease without esophagitis K21.9 530.81 2D ECHO COMPLETE ADULT (TTE) W OR WO CONTR   7. Chronic ischemic heart disease I25.9 414.9 NUCLEAR STRESS TEST      NM CARDIAC SPECT W STRS/REST MULT      2D ECHO COMPLETE ADULT (TTE) W OR WO CONTR   8. SINGH (dyspnea on exertion) R06.09 786.09 NUCLEAR STRESS TEST      NM CARDIAC SPECT W STRS/REST MULT      2D ECHO COMPLETE ADULT (TTE) W OR WO CONTR   9.  Anxiety and depression--reactive--recent death of Spouse F41.9 300.00 NUCLEAR STRESS TEST    F32.9 311 NM CARDIAC SPECT W STRS/REST MULT      2D ECHO COMPLETE ADULT (TTE) W OR WO CONTR         Discussion: Patient presents at this time with progressive rest and exertional chest pain and SINGH suspicious of ACS intermixed with reflux esophagitis due to recent stress, anxiety and reactive depression since death of wife recently. Given CAD risk factors and s/p CABG in remote past, will start imdur 30 mg every day and sl NTG prn. Have also recommended OTC Prilosec 20 mg BID and Zantac 150 mg BID for now til w/u complete. Will schedule for Lexiscan NST and  Echo. If worse then to ED ED [facility] via EMS. Discussed with Son as well. Plan: 1. Continue same meds. Lipid profile and labs followed by PCP    2. Encouraged to exercise to tolerance and follow low fat, low cholesterol, low sodium predominantly Plant-based (consider Mediterranean) diet. Call with questions or concerns. Will follow up any test results by phone and/or f/u here in office if needed. [place] 3.Follow up: 1-2 weeks    I have discussed the diagnosis with the patient and the intended plan as seen in the above orders. The patient has received an after-visit summary and questions were answered concerning future plans. I have discussed any concerning medication side effects and warnings with the patient as well.     Chary Elmore MD  10/17/2018

## 2018-10-17 NOTE — PROGRESS NOTES
1. Have you been to the ER, urgent care clinic since your last visit? Hospitalized since your last visit? No    2. Have you seen or consulted any other health care providers outside of the [address] [place] [address] since your last visit? Include any pap smears or colon screening.  No

## 2018-10-17 NOTE — TELEPHONE ENCOUNTER
Pt called because he is at the pharmacy waiting for his prescriptions and the pharmacy said they have not received anything from the office yet. This pt was seen today in the office.      Thanks

## 2018-10-17 NOTE — TELEPHONE ENCOUNTER
Verbal order per Dr. Jacky Whelan, start Nitrodur 0.3 mg/hr, apply one patch every morning, remove at night.

## 2018-10-18 PROBLEM — R06.09 DOE (DYSPNEA ON EXERTION): Status: ACTIVE | Noted: 2018-10-18

## 2018-10-18 PROBLEM — F41.9 ANXIETY AND DEPRESSION: Status: ACTIVE | Noted: 2018-10-18

## 2018-10-18 PROBLEM — F32.A ANXIETY AND DEPRESSION: Status: ACTIVE | Noted: 2018-10-18

## 2018-10-18 RX ORDER — NITROGLYCERIN 0.4 MG/1
0.4 TABLET SUBLINGUAL
Qty: 30 TAB | Refills: 3 | Status: SHIPPED | OUTPATIENT
Start: 2018-10-18

## 2018-10-18 RX ORDER — ISOSORBIDE MONONITRATE 30 MG/1
30 TABLET, EXTENDED RELEASE ORAL DAILY
Qty: 30 TAB | Refills: 3 | Status: SHIPPED | OUTPATIENT
Start: 2018-10-18 | End: 2019-06-17 | Stop reason: SDUPTHER

## 2018-10-18 RX ORDER — ISOSORBIDE MONONITRATE 30 MG/1
TABLET, EXTENDED RELEASE ORAL DAILY
COMMUNITY
End: 2018-10-18 | Stop reason: SDUPTHER

## 2018-10-18 NOTE — TELEPHONE ENCOUNTER
Verbal order per Dr. Mile Zarco, STOP NTG patch. Start Isosorbide 30 mg po every day. Spoke with patient. Verified patient with two patient identifiers. Advised stop NTG patch. Start Isosorbide 30 mg po every day. If develops headaches, cut in half. If lightheadedness or dizziness develops, call office for med adjustment. Patient verbalized understanding.

## 2018-10-19 ENCOUNTER — CLINICAL SUPPORT (OUTPATIENT)
Dept: CARDIOLOGY CLINIC | Age: 78
End: 2018-10-19

## 2018-10-19 DIAGNOSIS — F32.A ANXIETY AND DEPRESSION: ICD-10-CM

## 2018-10-19 DIAGNOSIS — R07.9 CHEST PAIN AT REST: ICD-10-CM

## 2018-10-19 DIAGNOSIS — I25.9 CHRONIC ISCHEMIC HEART DISEASE: ICD-10-CM

## 2018-10-19 DIAGNOSIS — R73.03 PREDIABETES: ICD-10-CM

## 2018-10-19 DIAGNOSIS — R06.00 DYSPNEA, UNSPECIFIED TYPE: Primary | ICD-10-CM

## 2018-10-19 DIAGNOSIS — F41.9 ANXIETY AND DEPRESSION: ICD-10-CM

## 2018-10-19 DIAGNOSIS — R06.09 DOE (DYSPNEA ON EXERTION): ICD-10-CM

## 2018-10-19 DIAGNOSIS — E78.00 HYPERCHOLESTEROLEMIA: ICD-10-CM

## 2018-10-19 DIAGNOSIS — K21.9 GASTROESOPHAGEAL REFLUX DISEASE WITHOUT ESOPHAGITIS: ICD-10-CM

## 2018-10-19 DIAGNOSIS — Z95.1 S/P CABG X 3: ICD-10-CM

## 2018-10-23 ENCOUNTER — TELEPHONE (OUTPATIENT)
Dept: CARDIOLOGY CLINIC | Age: 78
End: 2018-10-23

## 2018-10-24 NOTE — TELEPHONE ENCOUNTER
Spoke with patient. Verified patient with two patient identifiers. Advised Nuc EST normal.  Patient verbalized understanding.

## 2018-10-26 ENCOUNTER — TELEPHONE (OUTPATIENT)
Dept: CARDIOLOGY CLINIC | Age: 78
End: 2018-10-26

## 2018-10-26 NOTE — TELEPHONE ENCOUNTER
Spoke with patient. Verified patient with two patient identifiers. Advised echo normal.  Patient verbalized understanding.

## 2018-10-26 NOTE — TELEPHONE ENCOUNTER
----- Message from Lucía Roque MD sent at 10/25/2018 11:44 PM EDT -----  Regarding: ECHO  NORMAL    B  ----- Message -----  From: Wm, Card Result In  Sent: 10/25/2018  11:51 AM  To: Lucía Roque MD

## 2018-10-26 NOTE — TELEPHONE ENCOUNTER
Spoke with patient. Verified patient with two patient identifiers. Pt asking since EST and echo normal, should he continue the Imdur or stop it. Please advise.

## 2018-10-29 ENCOUNTER — LAB ONLY (OUTPATIENT)
Dept: FAMILY MEDICINE CLINIC | Age: 78
End: 2018-10-29

## 2018-10-29 DIAGNOSIS — I25.9 CHRONIC ISCHEMIC HEART DISEASE: Primary | ICD-10-CM

## 2018-10-29 DIAGNOSIS — E78.00 HYPERCHOLESTEROLEMIA: ICD-10-CM

## 2018-10-29 DIAGNOSIS — I10 HTN, GOAL BELOW 130/80: ICD-10-CM

## 2018-10-29 DIAGNOSIS — R73.03 PREDIABETES: ICD-10-CM

## 2018-10-29 NOTE — PROGRESS NOTES
Order placed for routine labs , per Verbal Order from Dr. Mark Taylor on 10/29/2018 due to need for routine labs

## 2018-10-30 LAB
ALBUMIN SERPL-MCNC: 4.2 G/DL (ref 3.5–4.8)
ALBUMIN/GLOB SERPL: 1.8 {RATIO} (ref 1.2–2.2)
ALP SERPL-CCNC: 43 IU/L (ref 39–117)
ALT SERPL-CCNC: 20 IU/L (ref 0–44)
AST SERPL-CCNC: 19 IU/L (ref 0–40)
BILIRUB SERPL-MCNC: 0.9 MG/DL (ref 0–1.2)
BUN SERPL-MCNC: 20 MG/DL (ref 8–27)
BUN/CREAT SERPL: 18 (ref 10–24)
CALCIUM SERPL-MCNC: 9.6 MG/DL (ref 8.6–10.2)
CHLORIDE SERPL-SCNC: 104 MMOL/L (ref 96–106)
CHOLEST SERPL-MCNC: 128 MG/DL (ref 100–199)
CO2 SERPL-SCNC: 26 MMOL/L (ref 20–29)
CREAT SERPL-MCNC: 1.09 MG/DL (ref 0.76–1.27)
ERYTHROCYTE [DISTWIDTH] IN BLOOD BY AUTOMATED COUNT: 13.8 % (ref 12.3–15.4)
GLOBULIN SER CALC-MCNC: 2.3 G/DL (ref 1.5–4.5)
GLUCOSE SERPL-MCNC: 93 MG/DL (ref 65–99)
HCT VFR BLD AUTO: 35.5 % (ref 37.5–51)
HDLC SERPL-MCNC: 63 MG/DL
HGB BLD-MCNC: 11.2 G/DL (ref 13–17.7)
LDLC SERPL CALC-MCNC: 52 MG/DL (ref 0–99)
MCH RBC QN AUTO: 30.1 PG (ref 26.6–33)
MCHC RBC AUTO-ENTMCNC: 31.5 G/DL (ref 31.5–35.7)
MCV RBC AUTO: 95 FL (ref 79–97)
PLATELET # BLD AUTO: 160 X10E3/UL (ref 150–379)
POTASSIUM SERPL-SCNC: 3.9 MMOL/L (ref 3.5–5.2)
PROT SERPL-MCNC: 6.5 G/DL (ref 6–8.5)
RBC # BLD AUTO: 3.72 X10E6/UL (ref 4.14–5.8)
SODIUM SERPL-SCNC: 144 MMOL/L (ref 134–144)
TRIGL SERPL-MCNC: 64 MG/DL (ref 0–149)
VLDLC SERPL CALC-MCNC: 13 MG/DL (ref 5–40)
WBC # BLD AUTO: 5.5 X10E3/UL (ref 3.4–10.8)

## 2018-10-30 NOTE — TELEPHONE ENCOUNTER
Spoke with patient. Verified patient with two patient identifiers. Advised to stop Isosorbide. Patient verbalized understanding.

## 2018-11-05 ENCOUNTER — OFFICE VISIT (OUTPATIENT)
Dept: FAMILY MEDICINE CLINIC | Age: 78
End: 2018-11-05

## 2018-11-05 VITALS
RESPIRATION RATE: 18 BRPM | SYSTOLIC BLOOD PRESSURE: 124 MMHG | DIASTOLIC BLOOD PRESSURE: 60 MMHG | TEMPERATURE: 96.9 F | HEART RATE: 82 BPM | HEIGHT: 67 IN | OXYGEN SATURATION: 97 % | WEIGHT: 173.8 LBS | BODY MASS INDEX: 27.28 KG/M2

## 2018-11-05 DIAGNOSIS — Z13.5 SCREENING FOR GLAUCOMA: ICD-10-CM

## 2018-11-05 DIAGNOSIS — Z23 ENCOUNTER FOR IMMUNIZATION: Primary | ICD-10-CM

## 2018-11-05 DIAGNOSIS — I10 HTN, GOAL BELOW 140/90: ICD-10-CM

## 2018-11-05 DIAGNOSIS — I25.10 CORONARY ARTERY DISEASE INVOLVING NATIVE CORONARY ARTERY OF NATIVE HEART WITHOUT ANGINA PECTORIS: ICD-10-CM

## 2018-11-05 RX ORDER — FERROUS SULFATE 325(65) MG
325 TABLET, DELAYED RELEASE (ENTERIC COATED) ORAL
Qty: 90 TAB | Refills: 3
Start: 2018-11-05 | End: 2018-12-11 | Stop reason: SDUPTHER

## 2018-11-05 NOTE — PROGRESS NOTES
Name and  verified Chief Complaint Patient presents with  Hypertension f/u PATIENT'S LAST ANNUAL WELLNESS EXAM 7/3/2018. Health Maintenance reviewed-discussed with patient. 1. Have you been to the ER, urgent care clinic since your last visit? Hospitalized since your last visit? Yes, Cardiology office visit . 2. Have you seen or consulted any other health care providers outside of the [address] [place] [address] since your last visit? Include any pap smears or colon screening.  no

## 2018-11-05 NOTE — PATIENT INSTRUCTIONS
DASH Diet: Care Instructions Your Care Instructions The DASH diet is an eating plan that can help lower your blood pressure. DASH stands for Dietary Approaches to Stop Hypertension. Hypertension is high blood pressure. The DASH diet focuses on eating foods that are high in calcium, potassium, and magnesium. These nutrients can lower blood pressure. The foods that are highest in these nutrients are fruits, vegetables, low-fat dairy products, nuts, seeds, and legumes. But taking calcium, potassium, and magnesium supplements instead of eating foods that are high in those nutrients does not have the same effect. The DASH diet also includes whole grains, fish, and poultry. The DASH diet is one of several lifestyle changes your doctor may recommend to lower your high blood pressure. Your doctor may also want you to decrease the amount of sodium in your diet. Lowering sodium while following the DASH diet can lower blood pressure even further than just the DASH diet alone. Follow-up care is a key part of your treatment and safety. Be sure to make and go to all appointments, and call your doctor if you are having problems. It's also a good idea to know your test results and keep a list of the medicines you take. How can you care for yourself at home? Following the DASH diet · Eat 4 to 5 servings of fruit each day. A serving is 1 medium-sized piece of fruit, ½ cup chopped or canned fruit, 1/4 cup dried fruit, or 4 ounces (½ cup) of fruit juice. Choose fruit more often than fruit juice. · Eat 4 to 5 servings of vegetables each day. A serving is 1 cup of lettuce or raw leafy vegetables, ½ cup of chopped or cooked vegetables, or 4 ounces (½ cup) of vegetable juice. Choose vegetables more often than vegetable juice. · Get 2 to 3 servings of low-fat and fat-free dairy each day. A serving is 8 ounces of milk, 1 cup of yogurt, or 1 ½ ounces of cheese. · Eat 6 to 8 servings of grains each day. A serving is 1 slice of bread, 1 ounce of dry cereal, or ½ cup of cooked rice, pasta, or cooked cereal. Try to choose whole-grain products as much as possible. · Limit lean meat, poultry, and fish to 2 servings each day. A serving is 3 ounces, about the size of a deck of cards. · Eat 4 to 5 servings of nuts, seeds, and legumes (cooked dried beans, lentils, and split peas) each week. A serving is 1/3 cup of nuts, 2 tablespoons of seeds, or ½ cup of cooked beans or peas. · Limit fats and oils to 2 to 3 servings each day. A serving is 1 teaspoon of vegetable oil or 2 tablespoons of salad dressing. · Limit sweets and added sugars to 5 servings or less a week. A serving is 1 tablespoon jelly or jam, ½ cup sorbet, or 1 cup of lemonade. · Eat less than 2,300 milligrams (mg) of sodium a day. If you limit your sodium to 1,500 mg a day, you can lower your blood pressure even more. Tips for success · Start small. Do not try to make dramatic changes to your diet all at once. You might feel that you are missing out on your favorite foods and then be more likely to not follow the plan. Make small changes, and stick with them. Once those changes become habit, add a few more changes. · Try some of the following: ? Make it a goal to eat a fruit or vegetable at every meal and at snacks. This will make it easy to get the recommended amount of fruits and vegetables each day. ? Try yogurt topped with fruit and nuts for a snack or healthy dessert. ? Add lettuce, tomato, cucumber, and onion to sandwiches. ? Combine a ready-made pizza crust with low-fat mozzarella cheese and lots of vegetable toppings. Try using tomatoes, squash, spinach, broccoli, carrots, cauliflower, and onions. ? Have a variety of cut-up vegetables with a low-fat dip as an appetizer instead of chips and dip. ? Sprinkle sunflower seeds or chopped almonds over salads.  Or try adding chopped walnuts or almonds to cooked vegetables. ? Try some vegetarian meals using beans and peas. Add garbanzo or kidney beans to salads. Make burritos and tacos with mashed leigh beans or black beans. Where can you learn more? Go to http://julisa-davide.info/. Enter R189 in the search box to learn more about \"DASH Diet: Care Instructions. \" Current as of: December 6, 2017 Content Version: 11.8 © 8819-1605 [employer]. Care instructions adapted under license by [employer] (which disclaims liability or warranty for this information). If you have questions about a medical condition or this instruction, always ask your healthcare professional. [address] any warranty or liability for your use of this information.

## 2018-11-05 NOTE — PROGRESS NOTES
HISTORY OF PRESENT ILLNESS Leonia Meigs is a 77 y.o. male. HPI Present with hx of HTN, stressed out recently lost his wife to advanced dementia, stating that he is trying to cope with the lost has a good family support, recnely his lab results nl except for low H/h not into critical range, Today pt present for Bp check and the patient stating that so far there has been a Compliancy w/ the bp meds, he is having had the low salt diet the patient has been active life style and does do the daily walking, He states that patien does obtain the bp at home few times a week and the average of 130/70 , today the pt denies Chest Pain, has no legs swelling no lightheadedness, 
the pat has not been feeling anxious, and  Has not been feeling stressed out, feeling lost  for 62 yrs, does not want to be medicated,  
otherwise feeling better since the last visit Current Outpatient Medications Medication Sig Dispense Refill  nitroglycerin (NITROSTAT) 0.4 mg SL tablet 1 Tab by SubLINGual route every five (5) minutes as needed for Chest Pain. Up to 3 doses. 30 Tab 3  
 rosuvastatin (CRESTOR) 20 mg tablet TAKE 1 TABLET EVERY NIGHT 90 Tab 2  
 COLACE 100 mg capsule Take 100 mg by mouth daily as needed.  losartan-hydroCHLOROthiazide (HYZAAR) 100-25 mg per tablet Take 1 Tab by mouth daily. Indications: hypertension 90 Tab 3  ibuprofen (ADVIL) 200 mg tablet Take 3 Tabs by mouth every eight (8) hours as needed for Pain. 40 Tab 0  cholecalciferol (VITAMIN D3) 1,000 unit tablet Take 1 Tab by mouth daily. 30 Tab 11  
 aspirin 81 mg tablet Take 81 mg by mouth daily.  MULTIVITAMIN (MULTIPLE VITAMIN PO) Take 1 Tab by mouth daily.  isosorbide mononitrate ER (IMDUR) 30 mg tablet Take 1 Tab by mouth daily. 30 Tab 3  
 vitamins a & d cap  psyllium (METAMUCIL) powd One cup w/ 12 oz of water twice daily and hold for loose stool (Patient not taking: Reported on 1/3/2018) 660 g 6  
  ondansetron (ZOFRAN ODT) 4 mg disintegrating tablet Take 1 Tab by mouth every eight (8) hours as needed for Nausea. (Patient not taking: Reported on 1/3/2018) 10 Tab 0  
 vitamin e (E GEMS) 1,000 unit capsule Take 1,000 Units by mouth daily. Allergies Allergen Reactions  Ace Inhibitors Cough Past Medical History:  
Diagnosis Date  Acute bronchitis 3/16/2016  Back pain, chronic 11/7/2014  Breast lump on left side at 11 o'clock position 5/7/2015  CAD (coronary artery disease)  Cat scratch of right forearm with infection 6/30/2016  Chronic bronchitis ([address] 4/5/2016  Chronic idiopathic constipation 4/17/2017  Gastroesophageal reflux disease without esophagitis 4/17/2017  H/O asbestos exposure 4/5/2016  H/O asbestos exposure 4/5/2016  Hypercholesterolemia  Hypertension  Ketonuria 4/17/2017  Need for diphtheria-tetanus-pertussis (Tdap) vaccine, adult/adolescent 4/4/2014  Need for pneumococcal vaccination 4/4/2014  Needs flu shot 4/4/2014  S/P CABG x 3   
 2001  Swelling of right hand 7/7/2016  T12 compression fracture ([address] 11/10/2014 Past Surgical History:  
Procedure Laterality Date  CARDIAC SURG PROCEDURE UNLIST CABG  
 CARDIAC SURG PROCEDURE UNLIST  2000  
 bypass Family History Problem Relation Age of Onset  Heart Disease Mother  Cancer Father Social History Tobacco Use  Smoking status: Never Smoker  Smokeless tobacco: Never Used Substance Use Topics  Alcohol use: Yes Alcohol/week: 0.0 oz  
  Comment: occasionally Lab Results Component Value Date/Time WBC 5.5 10/29/2018 09:38 AM  
 HGB 11.2 (L) 10/29/2018 09:38 AM  
 HCT 35.5 (L) 10/29/2018 09:38 AM  
 PLATELET 812 98/46/7636 09:38 AM  
 MCV 95 10/29/2018 09:38 AM  
 
Lab Results Component Value Date/Time  GFR est non-AA 64 10/29/2018 09:38 AM  
 GFRNA, POC 59 (L) 04/18/2016 09:02 AM  
 GFR est AA 74 10/29/2018 09:38 AM  
 GFRAA, POC >60 04/18/2016 09:02 AM  
 Creatinine 1.09 10/29/2018 09:38 AM  
 Creatinine (POC) 1.2 04/18/2016 09:02 AM  
 BUN 20 10/29/2018 09:38 AM  
 Sodium 144 10/29/2018 09:38 AM  
 Potassium 3.9 10/29/2018 09:38 AM  
 Chloride 104 10/29/2018 09:38 AM  
 CO2 26 10/29/2018 09:38 AM  
  
Review of Systems Constitutional: Negative for chills and fever. HENT: Negative for congestion and nosebleeds. Eyes: Negative for blurred vision and pain. Respiratory: Negative for cough, shortness of breath and wheezing. Cardiovascular: Negative for chest pain and leg swelling. Gastrointestinal: Negative for constipation, diarrhea, nausea and vomiting. Genitourinary: Negative for dysuria and frequency. Musculoskeletal: Negative for joint pain and myalgias. Skin: Negative for itching and rash. Neurological: Negative for dizziness, loss of consciousness and headaches. Psychiatric/Behavioral: Negative for depression. The patient is not nervous/anxious and does not have insomnia. Physical Exam  
Constitutional: He is oriented to person, place, and time. He appears well-developed and well-nourished. HENT:  
Head: Normocephalic and atraumatic. Mouth/Throat: No oropharyngeal exudate. Eyes: Conjunctivae and EOM are normal.  
Neck: Normal range of motion. Neck supple. Cardiovascular: Normal rate, regular rhythm and normal heart sounds. No murmur heard. Pulmonary/Chest: Effort normal and breath sounds normal. No respiratory distress. Abdominal: Soft. Bowel sounds are normal. He exhibits no distension. There is no rebound. Musculoskeletal: He exhibits no edema or tenderness. Neurological: He is alert and oriented to person, place, and time. Skin: Skin is warm. No erythema. Psychiatric: He has a normal mood and affect. His behavior is normal.  
Nursing note and vitals reviewed. ASSESSMENT and PLAN Diagnoses and all orders for this visit: 1. Encounter for immunization -     varicella-zoster recombinant, PF, (SHINGRIX) 50 mcg/0.5 mL susr injection; 0.5 mL by IntraMUSCular route once for 1 dose. 2. HTN, goal below 140/90 3. Coronary artery disease involving native coronary artery of native heart without angina pectoris 4. Screening for glaucoma 
-     REFERRAL TO OPTOMETRY Other orders 
-     ferrous sulfate (IRON) 325 mg (65 mg iron) EC tablet; Take 1 Tab by mouth Daily (before breakfast). HTN controlled, no change of bp meds at this time,  Discussed sodium restriction, high k rich diet,  maintaining ideal body weight and regular exercise program such as daily walking 30 min perday 4-5 times per week,  medication compliance advised, was told to call back for rfs or of any concern

## 2018-12-11 RX ORDER — ROSUVASTATIN CALCIUM 20 MG/1
20 TABLET, COATED ORAL
Qty: 90 TAB | Refills: 2 | Status: SHIPPED | OUTPATIENT
Start: 2018-12-11 | End: 2019-06-17 | Stop reason: SDUPTHER

## 2018-12-11 RX ORDER — FERROUS SULFATE 325(65) MG
325 TABLET, DELAYED RELEASE (ENTERIC COATED) ORAL
Qty: 90 TAB | Refills: 3 | Status: SHIPPED | OUTPATIENT
Start: 2018-12-11 | End: 2020-01-16 | Stop reason: ALTCHOICE

## 2018-12-11 RX ORDER — LOSARTAN POTASSIUM AND HYDROCHLOROTHIAZIDE 25; 100 MG/1; MG/1
TABLET ORAL
Qty: 90 TAB | Refills: 3 | Status: SHIPPED | OUTPATIENT
Start: 2018-12-11 | End: 2019-06-17 | Stop reason: SDUPTHER

## 2018-12-11 NOTE — TELEPHONE ENCOUNTER
Last Visit: 11/5  Next Appt: none  Previous Refill Encounter: 11/5-Says no print    Requested Prescriptions     Pending Prescriptions Disp Refills    rosuvastatin (CRESTOR) 20 mg tablet 90 Tab 2    ferrous sulfate (IRON) 325 mg (65 mg iron) EC tablet 90 Tab 3     Sig: Take 1 Tab by mouth Daily (before breakfast).

## 2019-02-17 ENCOUNTER — OFFICE VISIT (OUTPATIENT)
Dept: URGENT CARE | Age: 79
End: 2019-02-17

## 2019-02-17 VITALS
TEMPERATURE: 98.3 F | HEART RATE: 75 BPM | SYSTOLIC BLOOD PRESSURE: 156 MMHG | RESPIRATION RATE: 18 BRPM | OXYGEN SATURATION: 98 % | WEIGHT: 177 LBS | BODY MASS INDEX: 27.78 KG/M2 | DIASTOLIC BLOOD PRESSURE: 72 MMHG | HEIGHT: 67 IN

## 2019-02-17 DIAGNOSIS — R05.9 COUGH: Primary | ICD-10-CM

## 2019-02-17 DIAGNOSIS — K21.9 GASTROESOPHAGEAL REFLUX DISEASE WITHOUT ESOPHAGITIS: ICD-10-CM

## 2019-02-17 RX ORDER — RANITIDINE 150 MG/1
150 TABLET, FILM COATED ORAL
Qty: 30 TAB | Refills: 0 | Status: SHIPPED | OUTPATIENT
Start: 2019-02-17 | End: 2020-01-16 | Stop reason: ALTCHOICE

## 2019-02-17 RX ORDER — BENZONATATE 200 MG/1
200 CAPSULE ORAL
Qty: 21 CAP | Refills: 0 | Status: SHIPPED | OUTPATIENT
Start: 2019-02-17 | End: 2019-02-24

## 2019-02-17 NOTE — PATIENT INSTRUCTIONS
Follow up with PCP in 1 week for re evaluation especially if symptoms have continued/not improved     Cough: Care Instructions  Your Care Instructions    A cough is your body's response to something that bothers your throat or airways. Many things can cause a cough. You might cough because of a cold or the flu, bronchitis, or asthma. Smoking, postnasal drip, allergies, and stomach acid that backs up into your throat also can cause coughs. A cough is a symptom, not a disease. Most coughs stop when the cause, such as a cold, goes away. You can take a few steps at home to cough less and feel better. Follow-up care is a key part of your treatment and safety. Be sure to make and go to all appointments, and call your doctor if you are having problems. It's also a good idea to know your test results and keep a list of the medicines you take. How can you care for yourself at home? · Drink lots of water and other fluids. This helps thin the mucus and soothes a dry or sore throat. Honey or lemon juice in hot water or tea may ease a dry cough. · Take cough medicine as directed by your doctor. · Prop up your head on pillows to help you breathe and ease a dry cough. · Try cough drops to soothe a dry or sore throat. Cough drops don't stop a cough. Medicine-flavored cough drops are no better than candy-flavored drops or hard candy. · Do not smoke. Avoid secondhand smoke. If you need help quitting, talk to your doctor about stop-smoking programs and medicines. These can increase your chances of quitting for good. When should you call for help? Call 911 anytime you think you may need emergency care.  For example, call if:    · You have severe trouble breathing.    Call your doctor now or seek immediate medical care if:    · You cough up blood.     · You have new or worse trouble breathing.     · You have a new or higher fever.     · You have a new rash.    Watch closely for changes in your health, and be sure to contact your doctor if:    · You cough more deeply or more often, especially if you notice more mucus or a change in the color of your mucus.     · You have new symptoms, such as a sore throat, an earache, or sinus pain.     · You do not get better as expected. Where can you learn more? Go to http://julisa-davide.info/. Enter D279 in the search box to learn more about \"Cough: Care Instructions. \"  Current as of: September 5, 2018  Content Version: 11.9  © 4426-6617 [employer]. Care instructions adapted under license by [employer] (which disclaims liability or warranty for this information). If you have questions about a medical condition or this instruction, always ask your healthcare professional. [address] any warranty or liability for your use of this information.

## 2019-02-17 NOTE — PROGRESS NOTES
2 weeks of cough present at night  Dry. No wheezing, SOB, chest tightness or orthopnea, chest pain or LE swelling. \"breathing fine\"  Cough is an annoyance. Does promote history of GERD. Has not tried any particular medications               Past Medical History:   Diagnosis Date    Acute bronchitis 3/16/2016    Back pain, chronic 11/7/2014    Breast lump on left side at 11 o'clock position 5/7/2015    CAD (coronary artery disease)     Cat scratch of right forearm with infection 6/30/2016    Chronic bronch[facility] [address] 4/5/2016    Chronic idiopathic constipation 4/17/2017    Gastroesophageal reflux disease without esophagitis 4/17/2017    H/O asbestos exposure 4/5/2016    H/O asbestos exposure 4/5/2016    Hypercholesterolemia     Hypertension     Ketonuria 4/17/2017    Need for diphtheria-tetanus-pertussis (Tdap) vaccine, adult/adolescent 4/4/2014    Need for pneumococcal vaccination 4/4/2014    Needs flu shot 4/4/2014    S/P CABG x 3     2001    Swelling of right hand 7/7/2016    T12 compression fracture ([facility] [address] 11/10/2014        Past Surgical History:   Procedure Laterality Date    CARDIAC SURG PROCEDURE UNLIST      CABG    CARDIAC SURG PROCEDURE UNLIST  2000    bypass         Family History   Problem Relation Age of Onset    Heart Disease Mother     Cancer Father         Social History     Socioeconomic History    Marital status:      Spouse name: Not on file    Number of children: Not on file    Years of education: Not on file    Highest education level: Not on file   Social Needs    Financial resource strain: Not on file    Food insecurity - worry: Not on file    Food insecurity - inability: Not on file   [employer] needs - medical: Not on file   [employer] needs - non-medical: Not on file   Occupational History    Not on file   Tobacco Use    Smoking status: Never Smoker    Smokeless tobacco: Never Used   Substance and Sexual Activity    Alcohol use:  Yes Alcohol/week: 0.0 oz     Comment: occasionally    Drug use: No    Sexual activity: Not on file   Other Topics Concern    Not on file   Social History Narrative    Not on file                ALLERGIES: Ace inhibitors    Review of Systems   All other systems reviewed and are negative. Vitals:    02/17/19 0922   BP: 156/72   Pulse: 75   Resp: 18   Temp: 98.3 °F (36.8 °C)   SpO2: 98%   Weight: 177 lb (80.3 kg)   Height: 5' 7\" (1.702 m)       Physical Exam   Constitutional: He is oriented to person, place, and time. No distress. Non-toxic appearing, well hydrated   HENT:   Head: Normocephalic and atraumatic. Right Ear: External ear normal.   Left Ear: External ear normal.   Nose: Nose normal.   Mouth/Throat: Oropharynx is clear and moist. No oropharyngeal exudate. TMs normal.   Eyes: Conjunctivae and EOM are normal. Pupils are equal, round, and reactive to light. No scleral icterus. Neck: Normal range of motion. Neck supple. Cardiovascular: Normal rate, regular rhythm and normal heart sounds. Exam reveals no gallop and no friction rub. No murmur heard. Pulmonary/Chest: Effort normal and breath sounds normal. No respiratory distress. He has no wheezes. He has no rales. Musculoskeletal:   No LE edema   Lymphadenopathy:     He has no cervical adenopathy. Neurological: He is alert and oriented to person, place, and time. Skin: Skin is warm and dry. No rash noted. He is not diaphoretic. No erythema. No pallor. Psychiatric: He has a normal mood and affect. His behavior is normal. Thought content normal.   Nursing note and vitals reviewed. MDM     Differential Diagnosis; Clinical Impression; Plan:       CLINICAL IMPRESSION:  (R05) Cough  (primary encounter diagnosis)  (K21.9) Gastroesophageal reflux disease without esophagitis    Orders Placed This Encounter      raNITIdine (ZANTAC) 150 mg tablet          Sig: Take 1 Tab by mouth nightly.           Dispense:  30 Tab          Refill:  0 benzonatate (TESSALON) 200 mg capsule          Sig: Take 1 Cap by mouth three (3) times daily as needed for Cough for up to 7 days. Dispense:  21 Cap          Refill:  0      Plan:  Suspect GERD  ddx post nasal drip  See above orders  Advised PCP follow up in 1 week if not improved. We have reviewed concerning signs/symptoms, normal vs abnormal progression of medical condition and when to seek immediate medical attention. Schedule with PCP or Urgent Care immediately for worsening or new symptoms.               Procedures

## 2019-02-27 ENCOUNTER — HOSPITAL ENCOUNTER (OUTPATIENT)
Dept: GENERAL RADIOLOGY | Age: 79
Discharge: HOME | End: 2019-02-27
Payer: MEDICARE

## 2019-02-27 DIAGNOSIS — R05.9 COUGH: ICD-10-CM

## 2019-02-27 PROCEDURE — 71046 X-RAY EXAM CHEST 2 VIEWS: CPT

## 2019-03-05 ENCOUNTER — OFFICE VISIT (OUTPATIENT)
Dept: FAMILY MEDICINE CLINIC | Age: 79
End: 2019-03-05

## 2019-03-05 VITALS
RESPIRATION RATE: 18 BRPM | BODY MASS INDEX: 28.05 KG/M2 | HEART RATE: 61 BPM | OXYGEN SATURATION: 99 % | DIASTOLIC BLOOD PRESSURE: 63 MMHG | TEMPERATURE: 96.7 F | HEIGHT: 67 IN | SYSTOLIC BLOOD PRESSURE: 127 MMHG | WEIGHT: 178.7 LBS

## 2019-03-05 DIAGNOSIS — Z77.090 H/O ASBESTOS EXPOSURE: ICD-10-CM

## 2019-03-05 DIAGNOSIS — J41.0 SIMPLE CHRONIC BRONCHITIS (HCC): Primary | ICD-10-CM

## 2019-03-05 RX ORDER — LEVOFLOXACIN 500 MG/1
500 TABLET, FILM COATED ORAL DAILY
Qty: 7 TAB | Refills: 0 | Status: SHIPPED | OUTPATIENT
Start: 2019-03-05 | End: 2019-03-12

## 2019-03-05 RX ORDER — CLEMASTINE FUMARATE 2.68 MG/1
TABLET ORAL 2 TIMES DAILY
COMMUNITY
Start: 2019-03-01

## 2019-03-05 RX ORDER — METHYLPREDNISOLONE 4 MG/1
TABLET ORAL
Qty: 1 DOSE PACK | Refills: 0 | Status: SHIPPED | OUTPATIENT
Start: 2019-03-05 | End: 2019-06-17 | Stop reason: ALTCHOICE

## 2019-03-05 NOTE — PATIENT INSTRUCTIONS
Cough: Care Instructions  Your Care Instructions    A cough is your body's response to something that bothers your throat or airways. Many things can cause a cough. You might cough because of a cold or the flu, bronchitis, or asthma. Smoking, postnasal drip, allergies, and stomach acid that backs up into your throat also can cause coughs. A cough is a symptom, not a disease. Most coughs stop when the cause, such as a cold, goes away. You can take a few steps at home to cough less and feel better. Follow-up care is a key part of your treatment and safety. Be sure to make and go to all appointments, and call your doctor if you are having problems. It's also a good idea to know your test results and keep a list of the medicines you take. How can you care for yourself at home? · Drink lots of water and other fluids. This helps thin the mucus and soothes a dry or sore throat. Honey or lemon juice in hot water or tea may ease a dry cough. · Take cough medicine as directed by your doctor. · Prop up your head on pillows to help you breathe and ease a dry cough. · Try cough drops to soothe a dry or sore throat. Cough drops don't stop a cough. Medicine-flavored cough drops are no better than candy-flavored drops or hard candy. · Do not smoke. Avoid secondhand smoke. If you need help quitting, talk to your doctor about stop-smoking programs and medicines. These can increase your chances of quitting for good. When should you call for help? Call 911 anytime you think you may need emergency care.  For example, call if:    · You have severe trouble breathing.    Call your doctor now or seek immediate medical care if:    · You cough up blood.     · You have new or worse trouble breathing.     · You have a new or higher fever.     · You have a new rash.    Watch closely for changes in your health, and be sure to contact your doctor if:    · You cough more deeply or more often, especially if you notice more mucus or a change in the color of your mucus.     · You have new symptoms, such as a sore throat, an earache, or sinus pain.     · You do not get better as expected. Where can you learn more? Go to http://julisa-davide.info/. Enter D279 in the search box to learn more about \"Cough: Care Instructions. \"  Current as of: September 5, 2018  Content Version: 11.9  © 4575-2098 [employer]. Care instructions adapted under license by [employer] (which disclaims liability or warranty for this information). If you have questions about a medical condition or this instruction, always ask your healthcare professional. [address] any warranty or liability for your use of this information. Body Mass Index: Care Instructions  Your Care Instructions    Body mass index (BMI) can help you see if your weight is raising your risk for health problems. It uses a formula to compare how much you weigh with how tall you are. · A BMI lower than 18.5 is considered underweight. · A BMI between 18.5 and 24.9 is considered healthy. · A BMI between 25 and 29.9 is considered overweight. A BMI of 30 or higher is considered obese. If your BMI is in the normal range, it means that you have a lower risk for weight-related health problems. If your BMI is in the overweight or obese range, you may be at increased risk for weight-related health problems, such as high blood pressure, heart disease, stroke, arthritis or joint pain, and diabetes. If your BMI is in the underweight range, you may be at increased risk for health problems such as fatigue, lower protection (immunity) against illness, muscle loss, bone loss, hair loss, and hormone problems. BMI is just one measure of your risk for weight-related health problems. You may be at higher risk for health problems if you are not active, you eat an unhealthy diet, or you drink too much alcohol or use tobacco products.   Follow-up care is a key part of your treatment and safety. Be sure to make and go to all appointments, and call your doctor if you are having problems. It's also a good idea to know your test results and keep a list of the medicines you take. How can you care for yourself at home? · Practice healthy eating habits. This includes eating plenty of fruits, vegetables, whole grains, lean protein, and low-fat dairy. · If your doctor recommends it, get more exercise. Walking is a good choice. Bit by bit, increase the amount you walk every day. Try for at least 30 minutes on most days of the week. · Do not smoke. Smoking can increase your risk for health problems. If you need help quitting, talk to your doctor about stop-smoking programs and medicines. These can increase your chances of quitting for good. · Limit alcohol to 2 drinks a day for men and 1 drink a day for women. Too much alcohol can cause health problems. If you have a BMI higher than 25  · Your doctor may do other tests to check your risk for weight-related health problems. This may include measuring the distance around your waist. A waist measurement of more than 40 inches in men or 35 inches in women can increase the risk of weight-related health problems. · Talk with your doctor about steps you can take to stay healthy or improve your health. You may need to make lifestyle changes to lose weight and stay healthy, such as changing your diet and getting regular exercise. If you have a BMI lower than 18.5  · Your doctor may do other tests to check your risk for health problems. · Talk with your doctor about steps you can take to stay healthy or improve your health. You may need to make lifestyle changes to gain or maintain weight and stay healthy, such as getting more healthy foods in your diet and doing exercises to build muscle. Where can you learn more? Go to http://julisa-davide.info/.   Enter S176 in the search box to learn more about \"Body Mass Index: Care Instructions. \"  Current as of: October 13, 2016  Content Version: 11.4  © 5728-0787 [employer]. Care instructions adapted under license by [employer] (which disclaims liability or warranty for this information). If you have questions about a medical condition or this instruction, always ask your healthcare professional. [place] [address] any warranty or liability for your use of this information.

## 2019-03-05 NOTE — PROGRESS NOTES
Name and  verified      Chief Complaint   Patient presents with    Cough     for three weeks    Sore Throat     started today 3/5/2019   Ear Pain      Health Maintenance reviewed-discussed with patient. 1. Have you been to the ER, urgent care clinic since your last visit? Hospitalized since your last visit? Yes, [facility] on [facility] for Cough. 2. Have you seen or consulted any other health care providers outside of the [address] [place] [address] since your last visit? Include any pap smears or colon screening. Yes, Pulmonary one week ago for cough.

## 2019-03-05 NOTE — PROGRESS NOTES
HISTORY OF PRESENT ILLNESS  Fernandez Miller is a 77 y.o. male. HPI   Upper respiratory problem    Started >9 days ago not better,   otc not helping, have a bad Sore throat, with a lot of painful Cough which are Productive yellowish , no hx of asthma but maybe of COPD, there has been a lot of decrease in the patient's sleep pattern, in addition there has been some muscle ache responding to OTC , no diarhea, no ear ache,also there has been a decrease in the appetite, has been had an exposure to sick person, fortunately a nonesmoker but based on the report pt has asbestos expo and he is aware of it,     IMPRESSION:  1. No acute process  2. Asbestos exposure  Went to  and was given raNITIdine (ZANTAC) 150 mg tablet to beTaken 1 Tab by mouth nightly,  benzonatate (TESSALON) 200 mg capsule to be taken 1 Cap by mouth three (3) times daily as needed for Cough for up to 7 days. Also stating that he Went to pulmonologist  Was given more antihistamine not better still coughing +sore throat, +dry cough,  , nofever no chills   Did not get his flu nor pna shots  But got his Tdap, declining all today        Current Outpatient Medications   Medication Sig Dispense Refill    clemastine 2.68 mg tab tablet Take  by mouth two (2) times a day.  losartan-hydroCHLOROthiazide (HYZAAR) 100-25 mg per tablet TAKE 1 TABLET EVERY DAY FOR HYPERTENSION 90 Tab 3    rosuvastatin (CRESTOR) 20 mg tablet Take 1 Tab by mouth nightly. 90 Tab 2    ferrous sulfate (IRON) 325 mg (65 mg iron) EC tablet Take 1 Tab by mouth Daily (before breakfast). 90 Tab 3    nitroglycerin (NITROSTAT) 0.4 mg SL tablet 1 Tab by SubLINGual route every five (5) minutes as needed for Chest Pain. Up to 3 doses. 30 Tab 3    ibuprofen (ADVIL) 200 mg tablet Take 3 Tabs by mouth every eight (8) hours as needed for Pain. 40 Tab 0    cholecalciferol (VITAMIN D3) 1,000 unit tablet Take 1 Tab by mouth daily. 30 Tab 11    aspirin 81 mg tablet Take 81 mg by mouth daily.  raNITIdine (ZANTAC) 150 mg tablet Take 1 Tab by mouth nightly. 30 Tab 0    isosorbide mononitrate ER (IMDUR) 30 mg tablet Take 1 Tab by mouth daily. 30 Tab 3    COLACE 100 mg capsule Take 100 mg by mouth daily as needed.  vitamins a & d cap       psyllium (METAMUCIL) powd One cup w/ 12 oz of water twice daily and hold for loose stool (Patient not taking: Reported on 1/3/2018) 660 g 6    ondansetron (ZOFRAN ODT) 4 mg disintegrating tablet Take 1 Tab by mouth every eight (8) hours as needed for Nausea. (Patient not taking: Reported on 1/3/2018) 10 Tab 0    vitamin e (E GEMS) 1,000 unit capsule Take 1,000 Units by mouth daily.  MULTIVITAMIN (MULTIPLE VITAMIN PO) Take 1 Tab by mouth daily.        Allergies   Allergen Reactions    Ace Inhibitors Cough     Past Medical History:   Diagnosis Date    Acute bronchitis 3/16/2016    Back pain, chronic 11/7/2014    Breast lump on left side at 11 o'clock position 5/7/2015    CAD (coronary artery disease)     Cat scratch of right forearm with infection 6/30/2016    Chronic bronchitis (HCC) 4/5/2016    Chronic idiopathic constipation 4/17/2017    Gastroesophageal reflux disease without esophagitis 4/17/2017    H/O asbestos exposure 4/5/2016    H/O asbestos exposure 4/5/2016    Hypercholesterolemia     Hypertension     Ketonuria 4/17/2017    Need for diphtheria-tetanus-pertussis (Tdap) vaccine, adult/adolescent 4/4/2014    Need for pneumococcal vaccination 4/4/2014    Needs flu shot 4/4/2014    S/P CABG x 3     2001    Swelling of right hand 7/7/2016    T12 compression fracture ([facility] [address] 11/10/2014     Past Surgical History:   Procedure Laterality Date    CARDIAC SURG PROCEDURE UNLIST      CABG    CARDIAC SURG PROCEDURE UNLIST  2000    bypass     Family History   Problem Relation Age of Onset    Heart Disease Mother     Cancer Father      Social History     Tobacco Use    Smoking status: Never Smoker    Smokeless tobacco: Never Used Substance Use Topics    Alcohol use: Yes     Alcohol/week: 0.0 oz     Comment: occasionally      Lab Results   Component Value Date/Time    WBC 5.5 10/29/2018 09:38 AM    HGB 11.2 (L) 10/29/2018 09:38 AM    HCT 35.5 (L) 10/29/2018 09:38 AM    PLATELET 918 91/32/5591 09:38 AM    MCV 95 10/29/2018 09:38 AM     Lab Results   Component Value Date/Time    TSH 0.671 07/05/2017 10:35 AM         Review of Systems   Constitutional: Negative for chills and fever. HENT: Positive for congestion and sore throat. Negative for ear pain and nosebleeds. Eyes: Positive for pain. Negative for blurred vision and discharge. Respiratory: Positive for cough and wheezing. Negative for shortness of breath. Cardiovascular: Negative for chest pain and leg swelling. Gastrointestinal: Negative for constipation, diarrhea, nausea and vomiting. Genitourinary: Negative for frequency. Musculoskeletal: Negative for joint pain. Skin: Negative for itching and rash. Neurological: Negative for headaches. Psychiatric/Behavioral: Negative for depression. The patient is not nervous/anxious. Physical Exam   Constitutional: He is oriented to person, place, and time. He appears well-developed and well-nourished. HENT:   Head: Normocephalic and atraumatic. Mouth/Throat: No oropharyngeal exudate. Eyes: Conjunctivae and EOM are normal.   Neck: Normal range of motion. Neck supple. Cardiovascular: Normal rate, regular rhythm and normal heart sounds. No murmur heard. Pulmonary/Chest: Effort normal and breath sounds normal. No respiratory distress. Abdominal: Soft. Bowel sounds are normal. He exhibits no distension. There is no rebound. Musculoskeletal: He exhibits no edema or tenderness. Neurological: He is alert and oriented to person, place, and time. Skin: Skin is warm. No erythema. Psychiatric: He has a normal mood and affect. His behavior is normal.   Nursing note and vitals reviewed.       ASSESSMENT and PLAN  Diagnoses and all orders for this visit:    1. Simple chronic bronchitis ([address]    2. H/O asbestos exposure    Other orders  -     levoFLOXacin (LEVAQUIN) 500 mg tablet; Take 1 Tab by mouth daily for 7 days. -     ipratropium (ATROVENT HFA) 17 mcg/actuation inhaler; Take 1 Puff by inhalation every six (6) hours as needed for Wheezing.  -     methylPREDNISolone (MEDROL DOSEPACK) 4 mg tablet; As directed for 6 days one package      Discussed the patient's BMI with him. The BMI follow up plan is as follows:     dietary management education, guidance, and counseling  encourage exercise  monitor weight  prescribed dietary intake    An After Visit Summary was printed and given to the patient.

## 2019-06-10 ENCOUNTER — LAB ONLY (OUTPATIENT)
Dept: FAMILY MEDICINE CLINIC | Age: 79
End: 2019-06-10

## 2019-06-10 DIAGNOSIS — J41.8 MIXED SIMPLE AND MUCOPURULENT CHRONIC BRONCHITIS (HCC): ICD-10-CM

## 2019-06-10 DIAGNOSIS — K21.9 GASTROESOPHAGEAL REFLUX DISEASE WITHOUT ESOPHAGITIS: ICD-10-CM

## 2019-06-10 DIAGNOSIS — Z77.090 H/O ASBESTOS EXPOSURE: Primary | ICD-10-CM

## 2019-06-11 LAB
ALBUMIN SERPL-MCNC: 4.1 G/DL (ref 3.5–4.7)
ALBUMIN/GLOB SERPL: 1.7 {RATIO} (ref 1.2–2.2)
ALP SERPL-CCNC: 47 IU/L (ref 39–117)
ALT SERPL-CCNC: 15 IU/L (ref 0–44)
AST SERPL-CCNC: 19 IU/L (ref 0–40)
BILIRUB SERPL-MCNC: 0.9 MG/DL (ref 0–1.2)
BUN SERPL-MCNC: 16 MG/DL (ref 8–27)
BUN/CREAT SERPL: 10 (ref 10–24)
CALCIUM SERPL-MCNC: 9.2 MG/DL (ref 8.6–10.2)
CHLORIDE SERPL-SCNC: 103 MMOL/L (ref 96–106)
CHOLEST SERPL-MCNC: 127 MG/DL (ref 100–199)
CO2 SERPL-SCNC: 26 MMOL/L (ref 20–29)
CREAT SERPL-MCNC: 1.54 MG/DL (ref 0.76–1.27)
ERYTHROCYTE [DISTWIDTH] IN BLOOD BY AUTOMATED COUNT: 14.3 % (ref 12.3–15.4)
EST. AVERAGE GLUCOSE BLD GHB EST-MCNC: 114 MG/DL
FERRITIN SERPL-MCNC: 96 NG/ML (ref 30–400)
FOLATE SERPL-MCNC: 16.2 NG/ML
GLOBULIN SER CALC-MCNC: 2.4 G/DL (ref 1.5–4.5)
GLUCOSE SERPL-MCNC: 91 MG/DL (ref 65–99)
HBA1C MFR BLD: 5.6 % (ref 4.8–5.6)
HCT VFR BLD AUTO: 41.1 % (ref 37.5–51)
HDLC SERPL-MCNC: 63 MG/DL
HGB BLD-MCNC: 13.5 G/DL (ref 13–17.7)
INTERPRETATION: NORMAL
LDLC SERPL CALC-MCNC: 52 MG/DL (ref 0–99)
MCH RBC QN AUTO: 32.2 PG (ref 26.6–33)
MCHC RBC AUTO-ENTMCNC: 32.8 G/DL (ref 31.5–35.7)
MCV RBC AUTO: 98 FL (ref 79–97)
PLATELET # BLD AUTO: 145 X10E3/UL (ref 150–450)
POTASSIUM SERPL-SCNC: 3.7 MMOL/L (ref 3.5–5.2)
PROT SERPL-MCNC: 6.5 G/DL (ref 6–8.5)
RBC # BLD AUTO: 4.19 X10E6/UL (ref 4.14–5.8)
SODIUM SERPL-SCNC: 143 MMOL/L (ref 134–144)
TRIGL SERPL-MCNC: 59 MG/DL (ref 0–149)
TSH SERPL DL<=0.005 MIU/L-ACNC: 1.36 UIU/ML (ref 0.45–4.5)
VIT B12 SERPL-MCNC: 268 PG/ML (ref 232–1245)
VLDLC SERPL CALC-MCNC: 12 MG/DL (ref 5–40)
WBC # BLD AUTO: 5.4 X10E3/UL (ref 3.4–10.8)

## 2019-06-17 ENCOUNTER — OFFICE VISIT (OUTPATIENT)
Dept: FAMILY MEDICINE CLINIC | Age: 79
End: 2019-06-17

## 2019-06-17 VITALS
WEIGHT: 173.3 LBS | TEMPERATURE: 96.6 F | HEART RATE: 63 BPM | BODY MASS INDEX: 27.2 KG/M2 | OXYGEN SATURATION: 97 % | DIASTOLIC BLOOD PRESSURE: 66 MMHG | HEIGHT: 67 IN | SYSTOLIC BLOOD PRESSURE: 130 MMHG | RESPIRATION RATE: 16 BRPM

## 2019-06-17 DIAGNOSIS — I25.110 CORONARY ARTERY DISEASE INVOLVING NATIVE HEART WITH UNSTABLE ANGINA PECTORIS, UNSPECIFIED VESSEL OR LESION TYPE (HCC): ICD-10-CM

## 2019-06-17 DIAGNOSIS — J41.8 MIXED SIMPLE AND MUCOPURULENT CHRONIC BRONCHITIS (HCC): ICD-10-CM

## 2019-06-17 DIAGNOSIS — N17.9 ACUTE RENAL FAILURE, UNSPECIFIED ACUTE RENAL FAILURE TYPE (HCC): ICD-10-CM

## 2019-06-17 DIAGNOSIS — Z71.89 ADVANCED DIRECTIVES, COUNSELING/DISCUSSION: ICD-10-CM

## 2019-06-17 DIAGNOSIS — Z13.39 SCREENING FOR ALCOHOLISM: ICD-10-CM

## 2019-06-17 DIAGNOSIS — D69.6 PLATELETS DECREASED (HCC): ICD-10-CM

## 2019-06-17 DIAGNOSIS — E53.8 B12 DEFICIENCY: ICD-10-CM

## 2019-06-17 DIAGNOSIS — S22.080A T12 COMPRESSION FRACTURE (HCC): ICD-10-CM

## 2019-06-17 DIAGNOSIS — Z00.00 MEDICARE ANNUAL WELLNESS VISIT, SUBSEQUENT: Primary | ICD-10-CM

## 2019-06-17 DIAGNOSIS — Z23 ENCOUNTER FOR IMMUNIZATION: ICD-10-CM

## 2019-06-17 DIAGNOSIS — Z13.31 SCREENING FOR DEPRESSION: ICD-10-CM

## 2019-06-17 DIAGNOSIS — N18.30 CKD (CHRONIC KIDNEY DISEASE) STAGE 3, GFR 30-59 ML/MIN (HCC): ICD-10-CM

## 2019-06-17 DIAGNOSIS — R13.14 PHARYNGOESOPHAGEAL DYSPHAGIA: ICD-10-CM

## 2019-06-17 RX ORDER — MULTIVITAMIN
1 TABLET ORAL DAILY
Qty: 60 TAB | Refills: 11 | Status: SHIPPED | OUTPATIENT
Start: 2019-06-17

## 2019-06-17 RX ORDER — LOSARTAN POTASSIUM AND HYDROCHLOROTHIAZIDE 25; 100 MG/1; MG/1
TABLET ORAL
Qty: 90 TAB | Refills: 3 | Status: SHIPPED | OUTPATIENT
Start: 2019-06-17 | End: 2020-01-16 | Stop reason: SDUPTHER

## 2019-06-17 RX ORDER — ROSUVASTATIN CALCIUM 20 MG/1
20 TABLET, COATED ORAL
Qty: 90 TAB | Refills: 2 | Status: SHIPPED | OUTPATIENT
Start: 2019-06-17 | End: 2020-01-16 | Stop reason: SDUPTHER

## 2019-06-17 RX ORDER — CYANOCOBALAMIN 1000 UG/ML
1000 INJECTION, SOLUTION INTRAMUSCULAR; SUBCUTANEOUS ONCE
Qty: 1 ML | Refills: 0
Start: 2019-06-17 | End: 2019-06-17

## 2019-06-17 RX ORDER — ISOSORBIDE MONONITRATE 30 MG/1
30 TABLET, EXTENDED RELEASE ORAL DAILY
Qty: 90 TAB | Refills: 3 | Status: SHIPPED | OUTPATIENT
Start: 2019-06-17 | End: 2020-09-16 | Stop reason: SDUPTHER

## 2019-06-17 NOTE — ACP (ADVANCE CARE PLANNING)
Advance Care Planning  Other Legally Authorized Decision Maker would be hCuy Shafer Child     as SDM     For My patients who has currently great Decision Making Capacity:     Patient is on presence of family member  stated that the pt wants to be not DNR at this time,  The pt likes to be a full code individual,  The patient states that there is a lot of will to live,  Pt was given the form,   will sign and bring us a copy on the later date.

## 2019-06-17 NOTE — PATIENT INSTRUCTIONS
Medicare Wellness Visit, Male The best way to live healthy is to have a lifestyle where you eat a well-balanced diet, exercise regularly, limit alcohol use, and quit all forms of tobacco/nicotine, if applicable. Regular preventive services are another way to keep healthy. Preventive services (vaccines, screening tests, monitoring & exams) can help personalize your care plan, which helps you manage your own care. Screening tests can find health problems at the earliest stages, when they are easiest to treat. [facility] follows the current, evidence-based guidelines published by the [facility] Vincen[facility] ([facility]) when recommending preventive services for our patients. Because we follow these guidelines, sometimes recommendations change over time as research supports it. (For example, a prostate screening blood test is no longer routinely recommended for men with no symptoms.) Of course, you and your doctor may decide to screen more often for some diseases, based on your risk and co-morbidities (chronic disease you are already diagnosed with). Preventive services for you include: - Medicare offers their members a free annual wellness visit, which is time for you and your primary care provider to discuss and plan for your preventive service needs. Take advantage of this benefit every year! 
-All adults over age 72 should receive the recommended pneumonia vaccines. Current USPSTF guidelines recommend a series of two vaccines for the best pneumonia protection.  
-All adults should have a flu vaccine yearly and an ECG.  All adults age 61 and older should receive a shingles vaccine once in their lifetime.   
-All adults age 38-68 who are overweight should have a diabetes screening test once every three years.  
-Other screening tests & preventive services for persons with diabetes include: an eye exam to screen for diabetic retinopathy, a kidney function test, a foot exam, and stricter control over your cholesterol.  
-Cardiovascular screening for adults with routine risk involves an electrocardiogram (ECG) at intervals determined by the provider.  
-Colorectal cancer screening should be done for adults age 54-65 with no increased risk factors for colorectal cancer. There are a number of acceptable methods of screening for this type of cancer. Each test has its own benefits and drawbacks. Discuss with your provider what is most appropriate for you during your annual wellness visit. The different tests include: colonoscopy (considered the best screening method), a fecal occult blood test, a fecal DNA test, and sigmoidoscopy. 
-All adults born between [facility] should be screened once for Hepatitis C. 
-An Abdominal Aortic Aneurysm (AAA) Screening is recommended for men age 73-68 who has ever smoked in their lifetime. Here is a list of your current Health Maintenance items (your personalized list of preventive services) with a due date: 
Health Maintenance Due Topic Date Due  Shingles Vaccine (1 of 2) 03/15/1989  Pneumococcal Vaccine (1 of 2 - PCV13) 03/15/2004  Glaucoma Screening   02/28/2016 [employer] Annual Well Visit  07/04/2019 Well Visit, Over 72: Care Instructions Your Care Instructions Physical exams can help you stay healthy. Your doctor has checked your overall health and may have suggested ways to take good care of yourself. He or she also may have recommended tests. At home, you can help prevent illness with healthy eating, regular exercise, and other steps. Follow-up care is a key part of your treatment and safety. Be sure to make and go to all appointments, and call your doctor if you are having problems. It's also a good idea to know your test results and keep a list of the medicines you take. How can you care for yourself at home? · Reach and stay at a healthy weight.  This will lower your risk for many problems, such as obesity, diabetes, heart disease, and high blood pressure. · Get at least 30 minutes of exercise on most days of the week. Walking is a good choice. You also may want to do other activities, such as running, swimming, cycling, or playing tennis or team sports. · Do not smoke. Smoking can make health problems worse. If you need help quitting, talk to your doctor about stop-smoking programs and medicines. These can increase your chances of quitting for good. · Protect your skin from too much sun. When you're outdoors from 10 a.m. to 4 p.m., stay in the shade or cover up with clothing and a hat with a wide brim. Wear sunglasses that block UV rays. Even when it's cloudy, put broad-spectrum sunscreen (SPF 30 or higher) on any exposed skin. · See a dentist one or two times a year for checkups and to have your teeth cleaned. · Wear a seat belt in the car. · Limit alcohol to 2 drinks a day for men and 1 drink a day for women. Too much alcohol can cause health problems. Follow your doctor's advice about when to have certain tests. These tests can spot problems early. For men and women · Cholesterol. Your doctor will tell you how often to have this done based on your overall health and other things that can increase your risk for heart attack and stroke. · Blood pressure. Have your blood pressure checked during a routine doctor visit. Your doctor will tell you how often to check your blood pressure based on your age, your blood pressure results, and other factors. · Diabetes. Ask your doctor whether you should have tests for diabetes. · Vision. Experts recommend that you have yearly exams for glaucoma and other age-related eye problems. · Hearing. Tell your doctor if you notice any change in your hearing. You can have tests to find out how well you hear. · Colon cancer tests. Keep having colon cancer tests as your doctor recommends. You can have one of several types of tests. · Heart attack and stroke risk. At least every 4 to 6 years, you should have your risk for heart attack and stroke assessed. Your doctor uses factors such as your age, blood pressure, cholesterol, and whether you smoke or have diabetes to show what your risk for a heart attack or stroke is over the next 10 years. · Osteoporosis. Talk to your doctor about whether you should have a bone density test to find out whether you have thinning bones. Also ask your doctor about whether you should take calcium and vitamin D supplements. For women · Pap test and pelvic exam. You may no longer need a Pap test. Talk with your doctor about whether to stop or continue to have Pap tests. · Breast exam and mammogram. Ask how often you should have a mammogram, which is an X-ray of your breasts. A mammogram can spot breast cancer before it can be felt and when it is easiest to treat. · Thyroid disease. Talk to your doctor about whether to have your thyroid checked as part of a regular physical exam. Women have an increased chance of a thyroid problem. For men · Prostate exam. Talk to your doctor about whether you should have a blood test (called a PSA test) for prostate cancer. Experts disagree on whether men should have this test. Some experts recommend that you discuss the benefits and risks of the test with your doctor. · Abdominal aortic aneurysm. Ask your doctor whether you should have a test to check for an aneurysm. You may need a test if you ever smoked or if your parent, brother, sister, or child has had an aneurysm. When should you call for help? Watch closely for changes in your health, and be sure to contact your doctor if you have any problems or symptoms that concern you. Where can you learn more? Go to http://julisa-davide.info/. Enter R954 in the search box to learn more about \"Well Visit, Over 65: Care Instructions. \" Current as of: March 28, 2018 Content Version: 11.9 © 9976-5542 [employer]. Care instructions adapted under license by [employer] (which disclaims liability or warranty for this information). If you have questions about a medical condition or this instruction, always ask your healthcare professional. [address] any warranty or liability for your use of this information. Vitamin B12: About This Test 
What is it? This blood test measures the amount of vitamin B12 in your blood. Your body needs this B vitamin to make blood cells and to keep your nervous system healthy. Why is this test done? This test is used to: · Check for vitamin B12 deficiency anemia, especially in those who have had stomach or intestinal surgery or who have small intestine problems or a family history of this anemia. · Diagnose the cause of certain types of anemia. · Help find the cause of a decrease in mental abilities or other nervous system symptoms, such as tingling or numbness of the arms or legs. How can you prepare for the test? 
· In general, you don't need to prepare before having this test. Your doctor may give you some specific instructions. What happens during the test? 
· A health professional takes a sample of your blood. What else should you know about the test? 
· Your results will include an explanation of what a \"normal\" result is. This is called a \"reference range. \" It is just a guide. Your doctor will evaluate your results based on your health and other factors. This means that a value that falls outside the normal values listed may still be normal for you. · Vitamin B12 is usually measured at the same time as folic acid is tested, because a lack of either one can lead to a form of anemia called megaloblastic anemia. How long does the test take? · The test will take a few minutes. What happens after the test? 
· You will probably be able to go home right away. · You can go back to your usual activities right away. Follow-up care is a key part of your treatment and safety. Be sure to make and go to all appointments, and call your doctor if you are having problems. It's also a good idea to keep a list of the medicines you take. Ask your doctor when you can expect to have your test results. Where can you learn more? Go to http://julisa-davide.info/. Enter Q213 in the search box to learn more about \"Vitamin B12: About This Test.\" Current as of: May 6, 2018 Content Version: 11.9 © 6163-8062 [employer], [employer]. Care instructions adapted under license by [employer] (which disclaims liability or warranty for this information). If you have questions about a medical condition or this instruction, always ask your healthcare professional. [address] any warranty or liability for your use of this information.

## 2019-06-17 NOTE — PROGRESS NOTES
This is the Subsequent Medicare Annual Wellness Exam, performed 12 months or more after the Initial AWV or the last Subsequent AWV    I have reviewed the patient's medical history in detail and updated the computerized patient record. History   Present for CPE, last Complete Physical exam was >1 yr ago , not Up todate w/ all vaccination all offered but refusing, last tetanus vaccine was in <6yrs ago .    Last psa exam was normal and was in  >2 yrs ago,    last colonoscopy was normal and was 2 yrs  Ago,   No past surgical hx,  last bone dexa scan was normal and was in 2015, pt has had hx of compression fx, currently on no caltrate bid      No family hx of breast cancer in a male  no family hx of prostate cancer   no family hx of colon cancer, father 65yo from bile duct cancer mother 67yo from heart Dz , +++sexaully active and ++++ physically active,  compliant w/ meds, no Rf needed for today for her meds.        pt states that he had not been depressed no hx of fall nl interest to do things     b12 def   Currently on no oral tablet daily, more fatigued less active, no metformin no etoh intake, on no multivitamin daily    Low plts, patient states that he gags on liquid every time he drinks or eat he has trouble swallowing he has to throw up has never had any upper endoscopy but has had history of reflux denies any red vomitus nor green    Cough   Hx of asbestosis, better    Past Medical History:   Diagnosis Date    Acute bronchitis 3/16/2016    Back pain, chronic 11/7/2014    Breast lump on left side at 11 o'clock position 5/7/2015    CAD (coronary artery disease)     Cat scratch of right forearm with infection 6/30/2016    Chronic bronchitis ([address] 4/5/2016    Chronic idiopathic constipation 4/17/2017    Gastroesophageal reflux disease without esophagitis 4/17/2017    H/O asbestos exposure 4/5/2016    H/O asbestos exposure 4/5/2016    Hypercholesterolemia     Hypertension     Ketonuria 4/17/2017    Need for diphtheria-tetanus-pertussis (Tdap) vaccine, adult/adolescent 4/4/2014    Need for pneumococcal vaccination 4/4/2014    Needs flu shot 4/4/2014    S/P CABG x 3     2001    Swelling of right hand 7/7/2016    T12 compression fracture ([address] 11/10/2014      Past Surgical History:   Procedure Laterality Date    CARDIAC SURG PROCEDURE UNLIST      CABG    CARDIAC SURG PROCEDURE UNLIST  2000    bypass     Current Outpatient Medications   Medication Sig Dispense Refill    clemastine 2.68 mg tab tablet Take  by mouth two (2) times a day.  ipratropium (ATROVENT HFA) 17 mcg/actuation inhaler Take 1 Puff by inhalation every six (6) hours as needed for Wheezing. 1 Inhaler 5    methylPREDNISolone (MEDROL DOSEPACK) 4 mg tablet As directed for 6 days one package 1 Dose Pack 0    raNITIdine (ZANTAC) 150 mg tablet Take 1 Tab by mouth nightly. 30 Tab 0    losartan-hydroCHLOROthiazide (HYZAAR) 100-25 mg per tablet TAKE 1 TABLET EVERY DAY FOR HYPERTENSION 90 Tab 3    rosuvastatin (CRESTOR) 20 mg tablet Take 1 Tab by mouth nightly. 90 Tab 2    ferrous sulfate (IRON) 325 mg (65 mg iron) EC tablet Take 1 Tab by mouth Daily (before breakfast). 90 Tab 3    isosorbide mononitrate ER (IMDUR) 30 mg tablet Take 1 Tab by mouth daily. 30 Tab 3    nitroglycerin (NITROSTAT) 0.4 mg SL tablet 1 Tab by SubLINGual route every five (5) minutes as needed for Chest Pain. Up to 3 doses. 30 Tab 3    COLACE 100 mg capsule Take 100 mg by mouth daily as needed.  vitamins a & d cap       psyllium (METAMUCIL) powd One cup w/ 12 oz of water twice daily and hold for loose stool (Patient not taking: Reported on 1/3/2018) 660 g 6    ondansetron (ZOFRAN ODT) 4 mg disintegrating tablet Take 1 Tab by mouth every eight (8) hours as needed for Nausea. (Patient not taking: Reported on 1/3/2018) 10 Tab 0    ibuprofen (ADVIL) 200 mg tablet Take 3 Tabs by mouth every eight (8) hours as needed for Pain.  40 Tab 0    cholecalciferol (VITAMIN D3) 1,000 unit tablet Take 1 Tab by mouth daily. 30 Tab 11    vitamin e (E GEMS) 1,000 unit capsule Take 1,000 Units by mouth daily.  aspirin 81 mg tablet Take 81 mg by mouth daily.  MULTIVITAMIN (MULTIPLE VITAMIN PO) Take 1 Tab by mouth daily. Allergies   Allergen Reactions    Ace Inhibitors Cough     Family History   Problem Relation Age of Onset    Heart Disease Mother     Cancer Father      Social History     Tobacco Use    Smoking status: Never Smoker    Smokeless tobacco: Never Used   Substance Use Topics    Alcohol use:  Yes     Alcohol/week: 0.0 oz     Comment: occasionally     Patient Active Problem List   Diagnosis Code    Coronary atherosclerosis of artery bypass graft I25.810    CAD (coronary artery disease) I25.10    S/P CABG x 3 Z95.1    Chronic ischemic heart disease I25.9    Hypercholesterolemia E78.00    Urinary frequency R35.0    HTN, goal below 130/80 I10    Macrocytic anemia D53.9    Prediabetes R73.03    Hyperbilirubinemia E80.6    Elevated PSA R97.20    Need for diphtheria-tetanus-pertussis (Tdap) vaccine, adult/adolescent Z23    Needs flu shot Z23    Need for pneumococcal vaccination Z23    Back pain, chronic M54.9, G89.29    T12 compression fracture (HCC) S22.080A    Breast lump on left side at 11 o'clock position N63.22    ASHD (arteriosclerotic heart disease) I25.10    Acute bronchitis J20.9    Chronic bronchitis (HCC) J42    H/O asbestos exposure Z77.090    Cough due to ACE inhibitor R05, T46.4X5A    Cat scratch of right forearm with infection S50.811A, L08.9, W55.03XA    Swelling of right hand M79.89    Chronic idiopathic constipation K59.04    Gastroesophageal reflux disease without esophagitis K21.9    Ketonuria R82.4    Chest pain at rest R07.9    SINGH (dyspnea on exertion) R06.09    Anxiety and depression F41.9, F32.9       Depression Risk Factor Screening:     3 most recent PHQ Screens 7/3/2018   Little interest or pleasure in doing things Not at all   Feeling down, depressed, irritable, or hopeless Not at all   Total Score PHQ 2 0     Alcohol Risk Factor Screening: You do not drink alcohol or very rarely. Functional Ability and Level of Safety:   Hearing Loss  Hearing is good. Activities of Daily Living  The home contains: handrails, grab bars and rugs  Patient does total self care    Fall Risk  Fall Risk Assessment, last 12 mths 11/5/2018   Able to walk? Yes   Fall in past 12 months? No       Abuse Screen  Patient is not abused    Cognitive Screening   Evaluation of Cognitive Function:  Has your family/caregiver stated any concerns about your memory: no  Normal    Patient Care Team   Patient Care Team:  Josef Forman MD as PCP - General (Family Practice)  Norma Hall MD as Physician (Cardiology)    Assessment/Plan   Education and counseling provided:  Are appropriate based on today's review and evaluation  End-of-Life planning (with patient's consent)  Bone mass measurement (DEXA)    Diagnoses and all orders for this visit:    1. Medicare annual wellness visit, subsequent    2. Advanced directives, counseling/discussion  -     ADVANCE CARE PLANNING FIRST 30 MINS  -     FULL CODE    3. Screening for alcoholism  -     [place] ANNUAL ALCOHOL SCREEN 15 MIN    4. Screening for depression  -     DEPRESSION SCREEN ANNUAL    5. Encounter for immunization  -     ADMIN PNEUMOCOCCAL VACCINE  -     PNEUMOCOCCAL CONJ VACCINE 13 VALENT IM  -     TETANUS, DIPHTHERIA TOXOIDS AND ACELLULAR PERTUSSIS VACCINE (TDAP), IN INDIVIDS. >=7, IM    6. Platelets decreased (HCC)  -     VITAMIN B12 INJECTION  -     THER/PROPH/DIAG INJECTION, SUBCUT/IM  -     cyanocobalamin (VITAMIN B-12) 1,000 mcg/mL injection; 1 mL by IntraMUSCular route once for 1 dose. (Patient not taking: Reported on 6/17/2019)  -     losartan-hydroCHLOROthiazide (HYZAAR) 100-25 mg per tablet; TAKE 1 TABLET EVERY DAY FOR HYPERTENSION  -     rosuvastatin (CRESTOR) 20 mg tablet;  Take 1 Tab by mouth nightly. -     isosorbide mononitrate ER (IMDUR) 30 mg tablet; Take 1 Tab by mouth daily.  -     CBC W/O DIFF; Future    7. Acute renal failure, unspecified acute renal failure type (HCC)  -     VITAMIN B12 INJECTION  -     THER/PROPH/DIAG INJECTION, SUBCUT/IM  -     cyanocobalamin (VITAMIN B-12) 1,000 mcg/mL injection; 1 mL by IntraMUSCular route once for 1 dose. (Patient not taking: Reported on 6/17/2019)  -     losartan-hydroCHLOROthiazide (HYZAAR) 100-25 mg per tablet; TAKE 1 TABLET EVERY DAY FOR HYPERTENSION  -     rosuvastatin (CRESTOR) 20 mg tablet; Take 1 Tab by mouth nightly. -     isosorbide mononitrate ER (IMDUR) 30 mg tablet; Take 1 Tab by mouth daily.  -     METABOLIC PANEL, BASIC; Future    8. B12 deficiency  -     VITAMIN B12 INJECTION  -     THER/PROPH/DIAG INJECTION, SUBCUT/IM  -     cyanocobalamin (VITAMIN B-12) 1,000 mcg/mL injection; 1 mL by IntraMUSCular route once for 1 dose. (Patient not taking: Reported on 6/17/2019)  -     losartan-hydroCHLOROthiazide (HYZAAR) 100-25 mg per tablet; TAKE 1 TABLET EVERY DAY FOR HYPERTENSION  -     rosuvastatin (CRESTOR) 20 mg tablet; Take 1 Tab by mouth nightly. -     isosorbide mononitrate ER (IMDUR) 30 mg tablet; Take 1 Tab by mouth daily.  -     CBC W/O DIFF; Future    9. T12 compression fracture ([facility])  -     calcium-cholecalciferol, D3, (CALTRATE 600+D) tablet; Take 1 Tab by mouth daily. 10. Coronary artery disease involving native heart with unstable angina pectoris, unspecified vessel or lesion type ([facility])  -     losartan-hydroCHLOROthiazide (HYZAAR) 100-25 mg per tablet; TAKE 1 TABLET EVERY DAY FOR HYPERTENSION  -     rosuvastatin (CRESTOR) 20 mg tablet; Take 1 Tab by mouth nightly. -     isosorbide mononitrate ER (IMDUR) 30 mg tablet; Take 1 Tab by mouth daily.  -     METABOLIC PANEL, BASIC; Future    11.  CKD (chronic kidney disease) stage 3, GFR 30-59 ml/min ([facility])  -     losartan-hydroCHLOROthiazide (HYZAAR) 100-25 mg per tablet; TAKE 1 TABLET EVERY DAY FOR HYPERTENSION  -     rosuvastatin (CRESTOR) 20 mg tablet; Take 1 Tab by mouth nightly. -     isosorbide mononitrate ER (IMDUR) 30 mg tablet; Take 1 Tab by mouth daily.  -     METABOLIC PANEL, BASIC; Future    12. Mixed simple and mucopurulent chronic bronchitis (HCC)  -     CT CHEST WO CONT; Future    13. Pharyngoesophageal dysphagia  -     REFERRAL TO GASTROENTEROLOGY    Other orders  -     varicella zoster vaccine live (VARICELLA-ZOSTER VACINE LIVE) 19,400 unit/0.65 mL susr injection; 1 Vial by SubCUTAneous route once for 1 dose. [address] education and counseling provided:  Age appropriate evidence-based preventive care recommendations based on today's review and evaluation; including relevant cancer screening guidelines, and vaccination recommendations. An After Visit Summary was printed and given to the patient with information about these guidelines, and a personalized schedule for health maintenance items. When appropriate and with patient agreement, orders noted below were placed to complete missing health maintenance items.       Health Maintenance Due   Topic Date Due    Shingrix Vaccine Age 49> (1 of 2) 03/15/1989    Pneumococcal 65+ years (1 of 2 - PCV13) 03/15/2004    GLAUCOMA SCREENING Q2Y  02/28/2016    MEDICARE YEARLY EXAM  07/04/2019

## 2019-06-17 NOTE — PROGRESS NOTES
Chief Complaint   Patient presents with   [facility] Annual Wellness Visit     1. Have you been to the ER, urgent care clinic since your last visit? Hospitalized since your last visit? No    2. Have you seen or consulted any other health care providers outside of the [address] [place] [address] since your last visit? Include any pap smears or colon screening. No      C/o coughing with eating or drinking  x months    Declines vaccines at this time    After obtaining consent, and per orders of , B12 injection 100 mcg/mlgiven to  Left deltoid IM. Patient instructed to remain in clinic for 15 minutes afterwards, and to report any adverse reaction to me immediately.  Patient did not have any adverse reactions during this office visit

## 2019-06-26 ENCOUNTER — HOSPITAL ENCOUNTER (OUTPATIENT)
Dept: CT IMAGING | Age: 79
Discharge: HOME | End: 2019-06-26
Attending: FAMILY MEDICINE
Payer: MEDICARE

## 2019-06-26 DIAGNOSIS — J41.8 MIXED SIMPLE AND MUCOPURULENT CHRONIC BRONCHITIS (HCC): ICD-10-CM

## 2019-06-26 PROCEDURE — 71250 CT THORAX DX C-: CPT

## 2019-07-15 ENCOUNTER — CLINICAL SUPPORT (OUTPATIENT)
Dept: FAMILY MEDICINE CLINIC | Age: 79
End: 2019-07-15

## 2019-07-15 DIAGNOSIS — E53.8 VITAMIN B12 DEFICIENCY NEUROPATHY (HCC): Primary | ICD-10-CM

## 2019-07-15 DIAGNOSIS — G63 VITAMIN B12 DEFICIENCY NEUROPATHY (HCC): Primary | ICD-10-CM

## 2019-07-15 NOTE — PROGRESS NOTES
Chief Complaint   Patient presents with    Immunization/Injection     b12     After obtaining consent, and per orders of , b12 1000mcg/ml  given to  Left deltoid IM  . Patient instructed to remain in clinic for 15 minutes afterwards, and to report any adverse reaction to me immediately.  Patient did not have any adverse reactions during this office visit

## 2019-07-15 NOTE — PATIENT INSTRUCTIONS
Cyanocobalamin (Vitamin B-12) (By injection)   Cyanocobalamin ([id number]-zm-[id number]-BAL-a-min)  Treats vitamin B-12 deficiency. Brand Name(s): B-12 Compliance Injection Kit, B-12 Kit, Physicians EZ Use B-12 Compliance, Vitamin Deficiency Injectable System - B12   There may be other brand names for this medicine. When This Medicine Should Not Be Used: This medicine is not right for everyone. Do not use it if you had an allergic reaction to vitamin B-12. How to Use This Medicine:   Injectable  · Your doctor will prescribe your exact dose and tell you how often it should be given. This medicine is given as a shot into one of your muscles. · Missed dose: Call your doctor or pharmacist for instructions. Drugs and Foods to Avoid:   Ask your doctor or pharmacist before using any other medicine, including over-the-counter medicines, vitamins, and herbal products. · Do not use folic acid supplements unless your doctor says it is okay. Warnings While Using This Medicine:   · Tell your doctor if you are pregnant or breastfeeding, or if you have kidney disease. · Keep all medicine out of the reach of children. Never share your medicine with anyone. Possible Side Effects While Using This Medicine:   Call your doctor right away if you notice any of these side effects:  · Allergic reaction: Itching or hives, swelling in your face or hands, swelling or tingling in your mouth or throat, chest tightness, trouble breathing  If you notice other side effects that you think are caused by this medicine, tell your doctor. Call your doctor for medical advice about side effects. You may report side effects to FDA at 5-600-FDA-9107  © 2017 [facility] Information is for End User's use only and may not be sold, redistributed or otherwise used for commercial purposes. The above information is an  only. It is not intended as medical advice for individual conditions or treatments.  Talk to your doctor, nurse or pharmacist before following any medical regimen to see if it is safe and effective for you.

## 2019-07-16 RX ORDER — CYANOCOBALAMIN 1000 UG/ML
1000 INJECTION, SOLUTION INTRAMUSCULAR; SUBCUTANEOUS ONCE
Qty: 1 ML | Refills: 0
Start: 2019-07-16 | End: 2019-07-16

## 2019-07-19 ENCOUNTER — HOSPITAL ENCOUNTER (OUTPATIENT)
Dept: GENERAL RADIOLOGY | Age: 79
Discharge: HOME | End: 2019-07-19
Attending: INTERNAL MEDICINE
Payer: MEDICARE

## 2019-07-19 DIAGNOSIS — R13.10 DYSPHAGIA: ICD-10-CM

## 2019-07-19 PROCEDURE — 74220 X-RAY XM ESOPHAGUS 1CNTRST: CPT

## 2019-10-10 ENCOUNTER — ANESTHESIA (OUTPATIENT)
Dept: ENDOSCOPY | Age: 79
End: 2019-10-10
Payer: MEDICARE

## 2019-10-10 ENCOUNTER — HOSPITAL ENCOUNTER (OUTPATIENT)
Age: 79
Setting detail: OUTPATIENT SURGERY
Discharge: HOME | End: 2019-10-10
Attending: INTERNAL MEDICINE | Admitting: INTERNAL MEDICINE
Payer: MEDICARE

## 2019-10-10 ENCOUNTER — ANESTHESIA EVENT (OUTPATIENT)
Dept: ENDOSCOPY | Age: 79
End: 2019-10-10
Payer: MEDICARE

## 2019-10-10 VITALS
DIASTOLIC BLOOD PRESSURE: 83 MMHG | WEIGHT: 175 LBS | HEART RATE: 66 BPM | BODY MASS INDEX: 27.47 KG/M2 | OXYGEN SATURATION: 99 % | TEMPERATURE: 97.7 F | HEIGHT: 67 IN | SYSTOLIC BLOOD PRESSURE: 148 MMHG | RESPIRATION RATE: 28 BRPM

## 2019-10-10 PROCEDURE — 88305 TISSUE EXAM BY PATHOLOGIST: CPT

## 2019-10-10 PROCEDURE — 74011250636 HC RX REV CODE- 250/636: Performed by: INTERNAL MEDICINE

## 2019-10-10 PROCEDURE — 74011250636 HC RX REV CODE- 250/636: Performed by: NURSE ANESTHETIST, CERTIFIED REGISTERED

## 2019-10-10 PROCEDURE — 76060000031 HC ANESTHESIA FIRST 0.5 HR: Performed by: INTERNAL MEDICINE

## 2019-10-10 PROCEDURE — 76040000019: Performed by: INTERNAL MEDICINE

## 2019-10-10 PROCEDURE — 74011000250 HC RX REV CODE- 250: Performed by: NURSE ANESTHETIST, CERTIFIED REGISTERED

## 2019-10-10 PROCEDURE — 77030019988 HC FCPS ENDOSC DISP BSC -B: Performed by: INTERNAL MEDICINE

## 2019-10-10 PROCEDURE — 77030018712 HC DEV BLLN INFL BSC -B: Performed by: INTERNAL MEDICINE

## 2019-10-10 RX ORDER — PROPOFOL 10 MG/ML
INJECTION, EMULSION INTRAVENOUS AS NEEDED
Status: DISCONTINUED | OUTPATIENT
Start: 2019-10-10 | End: 2019-10-10 | Stop reason: HOSPADM

## 2019-10-10 RX ORDER — SODIUM CHLORIDE 9 MG/ML
50 INJECTION, SOLUTION INTRAVENOUS CONTINUOUS
Status: DISCONTINUED | OUTPATIENT
Start: 2019-10-10 | End: 2019-10-10 | Stop reason: HOSPADM

## 2019-10-10 RX ORDER — LIDOCAINE HYDROCHLORIDE 20 MG/ML
INJECTION, SOLUTION EPIDURAL; INFILTRATION; INTRACAUDAL; PERINEURAL AS NEEDED
Status: DISCONTINUED | OUTPATIENT
Start: 2019-10-10 | End: 2019-10-10 | Stop reason: HOSPADM

## 2019-10-10 RX ADMIN — SODIUM CHLORIDE 50 ML/HR: 900 INJECTION, SOLUTION INTRAVENOUS at 09:54

## 2019-10-10 RX ADMIN — LIDOCAINE HYDROCHLORIDE 100 MG: 20 INJECTION, SOLUTION EPIDURAL; INFILTRATION; INTRACAUDAL; PERINEURAL at 11:05

## 2019-10-10 RX ADMIN — PROPOFOL 60 MG: 10 INJECTION, EMULSION INTRAVENOUS at 11:08

## 2019-10-10 RX ADMIN — PROPOFOL 80 MG: 10 INJECTION, EMULSION INTRAVENOUS at 11:05

## 2019-10-10 NOTE — PROGRESS NOTES
Anesthesia reports 140mg Propofol, 100mg Lidocaine and 400mL NS given during procedure. Received report from anesthesia staff on vital signs and status of patient.

## 2019-10-10 NOTE — ANESTHESIA POSTPROCEDURE EVALUATION
Procedure(s):  ESOPHAGOGASTRODUODENOSCOPY (EGD)  ESOPHAGOGASTRODUODENAL (EGD) BIOPSY  ESOPHAGEAL DILATION. total IV anesthesia    Anesthesia Post Evaluation        Patient location during evaluation: PACU  Note status: Adequate. Level of consciousness: responsive to verbal stimuli and sleepy but conscious  Pain management: satisfactory to patient  Airway patency: patent  Anesthetic complications: no  Cardiovascular status: acceptable  Respiratory status: acceptable  Hydration status: acceptable  Comments: +Post-Anesthesia Evaluation and Assessment    Patient: Kimber Reddy MRN: [id number]  SSN: [id number]   YOB: 1940  Age: [de-identified] y.o. Sex: male      Cardiovascular Function/Vital Signs    /65   Pulse 66   Temp 36.4 °C (97.6 °F)   Resp 19   Ht 5' 7\" (1.702 m)   Wt 79.4 kg (175 lb)   SpO2 99%   BMI 27.41 kg/m²     Patient is status post Procedure(s):  ESOPHAGOGASTRODUODENOSCOPY (EGD)  ESOPHAGOGASTRODUODENAL (EGD) BIOPSY  ESOPHAGEAL DILATION. Nausea/Vomiting: Controlled. Postoperative hydration reviewed and adequate. Pain:  Pain Scale 1: Numeric (0 - 10) (10/10/19 0949)  Pain Intensity 1: 0 (10/10/19 0949)   Managed. Neurological Status: At baseline. Mental Status and Level of Consciousness: Arousable. Pulmonary Status:   O2 Device: Room air (10/10/19 1118)   Adequate oxygenation and airway patent. Complications related to anesthesia: None    Post-anesthesia assessment completed. No concerns.     Signed By: Sumi James MD    10/10/2019  Post anesthesia nausea and vomiting:  controlled      Vitals Value Taken Time   /65 10/10/2019 11:18 AM   Temp     Pulse 66 10/10/2019 11:18 AM   Resp 19 10/10/2019 11:18 AM   SpO2 99 % 10/10/2019 11:18 AM

## 2019-10-10 NOTE — H&P
Gastroenterology Outpatient History and Physical    [facility]    Physician: Davon Beltre MD    Chief Complaint: dysphagia  History of Present Illness: [de-identified] M with dysphagia to solids and liquids and normal BS    History:  Past Medical History:   Diagnosis Date    Acute bronchitis 3/16/2016    Back pain, chronic 11/7/2014    Breast lump on left side at 11 o'clock position 5/7/2015    CAD (coronary artery disease)     Cat scratch of right forearm with infection 6/30/2016    Chronic bronchitis ([address] 4/5/2016    Chronic idiopathic constipation 4/17/2017    Gastroesophageal reflux disease without esophagitis 4/17/2017    H/O asbestos exposure 4/5/2016    H/O asbestos exposure 4/5/2016    Hypercholesterolemia     Hypertension     Ketonuria 4/17/2017    Need for diphtheria-tetanus-pertussis (Tdap) vaccine, adult/adolescent 4/4/2014    Need for pneumococcal vaccination 4/4/2014    Needs flu shot 4/4/2014    S/P CABG x 3     2001    Swelling of right hand 7/7/2016    T12 compression fracture ([address] 11/10/2014      Past Surgical History:   Procedure Laterality Date    CARDIAC SURG PROCEDURE UNLIST      CABG    CARDIAC SURG PROCEDURE UNLIST  2000    bypass    [unfilled]   Family History   Problem Relation Age of Onset    Heart Disease Mother     Cancer Father       Patient Active Problem List   Diagnosis Code    Coronary atherosclerosis of artery bypass graft I25.810    CAD (coronary artery disease) I25.10    S/P CABG x 3 Z95.1    Chronic ischemic heart disease I25.9    Hypercholesterolemia E78.00    Urinary frequency R35.0    HTN, goal below 130/80 I10    Macrocytic anemia D53.9    Prediabetes R73.03    Hyperbilirubinemia E80.6    Elevated PSA R97.20    Back pain, chronic M54.9, G89.29    T12 compression fracture (HCC) S22.080A    Breast lump on left side at 11 o'clock position N63.22    ASHD (arteriosclerotic heart disease) I25.10    Acute bronchitis J20.9    Chronic bronchitis ([facility] J42    H/O asbestos exposure Z77.090    Cough due to ACE inhibitor R05, T46.4X5A    Cat scratch of right forearm with infection S50.811A, L08.9, W55.03XA    Swelling of right hand M79.89    Chronic idiopathic constipation K59.04    Gastroesophageal reflux disease without esophagitis K21.9    Ketonuria R82.4    Chest pain at rest R07.9    SINGH (dyspnea on exertion) R06.09    Anxiety and depression F41.9, F32.9    Coronary artery disease with unstable angina pectoris ([facility]) I25.110    CKD (chronic kidney disease) stage 3, GFR 30-59 ml/min ([facility]) N18.3    Acute renal failure ([facility]) N17.9       Allergies: Allergies   Allergen Reactions    Ace Inhibitors Cough     Medications:   Prior to Admission medications    Medication Sig Start Date End Date Taking? Authorizing Provider   losartan-hydroCHLOROthiazide (HYZAAR) 100-25 mg per tablet TAKE 1 TABLET EVERY DAY FOR HYPERTENSION 6/17/19  Yes David Polanco MD   rosuvastatin (CRESTOR) 20 mg tablet Take 1 Tab by mouth nightly. 6/17/19  Yes David Polanco MD   isosorbide mononitrate ER (IMDUR) 30 mg tablet Take 1 Tab by mouth daily. 6/17/19  Yes David Polanco MD   calcium-cholecalciferol, D3, (CALTRATE 600+D) tablet Take 1 Tab by mouth daily. 6/17/19  Yes David Polanco MD   clemastine 2.68 mg tab tablet Take  by mouth two (2) times a day. 3/1/19  Yes Provider, Historical   raNITIdine (ZANTAC) 150 mg tablet Take 1 Tab by mouth nightly. 2/17/19  Yes Shelby Barthel, NP   COLACE 100 mg capsule Take 100 mg by mouth daily as needed. 4/1/18  Yes Provider, Historical   vitamins a & d cap  7/1/17  Yes Provider, Historical   psyllium (METAMUCIL) powd One cup w/ 12 oz of water twice daily and hold for loose stool 4/17/17  Yes David Polanco MD   ibuprofen (ADVIL) 200 mg tablet Take 3 Tabs by mouth every eight (8) hours as needed for Pain.  7/7/16  Yes David Polanco MD   cholecalciferol (VITAMIN D3) 1,000 unit tablet Take 1 Tab by mouth daily. 5/9/16  Yes Carmen Gallardo MD   vitamin e (E GEMS) 1,000 unit capsule Take 1,000 Units by mouth daily. Yes Provider, Historical   aspirin 81 mg tablet Take 81 mg by mouth daily. Yes Provider, Historical   MULTIVITAMIN (MULTIPLE VITAMIN PO) Take 1 Tab by mouth daily. Yes Provider, Historical   ipratropium (ATROVENT HFA) 17 mcg/actuation inhaler Take 1 Puff by inhalation every six (6) hours as needed for Wheezing. Patient not taking: Reported on 6/17/2019 3/5/19   Carmen Gallardo MD   ferrous sulfate (IRON) 325 mg (65 mg iron) EC tablet Take 1 Tab by mouth Daily (before breakfast). Patient not taking: Reported on 6/17/2019 12/11/18   Carmen Gallardo MD   nitroglycerin (NITROSTAT) 0.4 mg SL tablet 1 Tab by SubLINGual route every five (5) minutes as needed for Chest Pain. Up to 3 doses. Patient not taking: Reported on 6/17/2019 10/18/18   Yan Biggs MD   ondansetron (ZOFRAN ODT) 4 mg disintegrating tablet Take 1 Tab by mouth every eight (8) hours as needed for Nausea. Patient not taking: Reported on 1/3/2018 3/27/17   Alma Delia Mckeon MD     Physical Exam:   Vital Signs: Blood pressure 151/70, pulse 66, temperature 97.6 °F (36.4 °C), resp. rate 16, height 5' 7\" (1.702 m), weight 79.4 kg (175 lb), SpO2 100 %.   General: well developed, well nourished   HEENT: unremarkable   Heart: regular rhythm no mumur    Lungs: clear   Abdominal:  benign   Neurological: unremarkable   Extremities: no edema     Findings/Diagnosis: Dysphagia  Plan of Care/Planned Procedure: EGD with bx and dilatation with conscious/deep sedation    Signed:  Judy Zuleta MD 10/10/2019

## 2019-10-10 NOTE — PROCEDURES
NAME:  Garrett Matthews Sr   :   1940   MRN:   [id number]     Date/Time:  10/10/2019 11:30 AM    Esophagogastroduodenoscopy (EGD) Procedure Note    Procedure: Esophagogastroduodenoscopy with biopsy, esophageal dilation    Indication:  Dysphagia/cough  Pre-operative Diagnosis: see indication above  Post-operative Diagnosis: see findings below  :  Kenney Davila MD  Referring Provider:   Nash Castro MD    Exam:  Airway: clear, no airway problems anticipated  Heart: RRR, without gallops or rubs  Lungs: clear bilaterally without wheezes, crackles, or rhonchi  Abdomen: soft, nontender, nondistended, bowel sounds present  Mental Status: awake, alert and oriented to person, place and time     Anethesia/Sedation:  MAC anesthesia Propofol  Procedure Details   After informed consent was obtained for the procedure, with all risks and benefits of procedure explained the patient was taken to the endoscopy suite and placed in the left lateral decubitus position. Following sequential administration of sedation as per above, the [id number] gastroscope was inserted into the mouth and advanced under direct vision to second portion of the duodenum. A careful inspection was made as the gastroscope was withdrawn, including a retroflexed view of the proximal stomach; findings and interventions are described below. Findings:    -Normal esophageal mucosa without stricture; biopsies obtained to exclude inflammation, followed by empiric dilatation with 54FR Savary dilator over wire and subsequent repeat endoscopic evaluation  -Normal stomach mucosa  -Normal duodenal mucosa     Therapies:  esophageal dilation with savary sizes 54FR; biopsy of esophagus  Specimens: #1 g-e jxn  EBL:  None. Complications:   None; patient tolerated the procedure well. Impression:    -Normal esophageal mucosa without stricture; biopsies obtained to exclude inflammation, followed by empiric dilatation with 54FR Savary dilator over wire and subsequent repeat endoscopic evaluation  -Normal stomach mucosa  -Normal duodenal mucosa     Recommendations:  -Await pathology. , -Follow symptoms.     Discharge disposition:  Home in the company of  when able to ambulate after colonoscopy    Lora Montes MD

## 2019-10-10 NOTE — DISCHARGE INSTRUCTIONS
Marcelle Chaney  [id number]  1940    EGD DISCHARGE INSTRUCTIONS  Discomfort:  Sore throat- throat lozenges or warm salt water gargle  redness at IV site- apply warm compress to area; if redness or soreness persist- contact your physician  Gaseous discomfort- walking, belching will help relieve any discomfort  You may not operate a vehicle for 12 hours  You may not engage in an occupation involving machinery or appliances for rest of today  You may not drink alcoholic beverages for at least 12 hours  Avoid making any critical decisions for at least 24 hour  DIET  You may have minimal sips at this time-- do not eat or drink for two hours. You may eat and drink after 1130am today  You may resume your regular diet - however -  remember your colon is empty and a heavy meal will produce gas. Avoid these foods:  vegetables, fried / greasy foods, carbonated drinks    MEDICATIONS:        ACTIVITY  You may resume your normal daily activities until tomorrow AM;  Spend the remainder of the day resting -  avoid any strenuous activity. CALL M.D.   ANY SIGN OF   Increasing pain, nausea, vomiting  Abdominal distension (swelling)  New increased bleeding (oral or rectal)  Fever (chills)  Pain in chest area  Bloody discharge from nose or mouth  Shortness of breath    IMPRESSION:  -Normal esophageal mucosa without stricture; biopsies obtained to exclude inflammation, followed by empiric dilatation with 54FR Savary dilator over wire and subsequent repeat endoscopic evaluation  -Normal stomach mucosa  -Normal duodenal mucosa    Follow-up Instructions:   Call Dr. Jessica England for the results of procedure / biopsy in 7-10 days   Telephone # [phone]    Wood Charles MD

## 2019-10-10 NOTE — ANESTHESIA PREPROCEDURE EVALUATION
Relevant Problems   No relevant active problems       Anesthetic History   No history of anesthetic complications            Review of Systems / Medical History  Patient summary reviewed, nursing notes reviewed and pertinent labs reviewed    Pulmonary                Comments:  Hx bronchitis    Neuro/Psych         Psychiatric history     Cardiovascular    Hypertension          CAD, CABG and hyperlipidemia    Exercise tolerance: >4 METS  Comments: S/P CABG x 3 (2001)     GI/Hepatic/Renal               Comments: Dysphagia Endo/Other  Within defined limits           Other Findings   Comments: Back pain, chronic   Hx T12 compression fracture           Physical Exam    Airway  Mallampati: II    Neck ROM: normal range of motion   Mouth opening: Normal     Cardiovascular  Regular rate and rhythm,  S1 and S2 normal,  no murmur, click, rub, or gallop             Dental  No notable dental hx       Pulmonary  Breath sounds clear to auscultation               Abdominal  GI exam deferred       Other Findings            Anesthetic Plan    ASA: 3  Anesthesia type: total IV anesthesia          Induction: Intravenous  Anesthetic plan and risks discussed with: Patient

## 2019-10-10 NOTE — ROUTINE PROCESS
Shelbi Nguyen  
1940 
[id number] Situation: 
Verbal report received from: KEILA Avery Procedure: Procedure(s): ESOPHAGOGASTRODUODENOSCOPY (EGD) ESOPHAGOGASTRODUODENAL (EGD) BIOPSY 
ESOPHAGEAL DILATION Background: 
 
Preoperative diagnosis: dysphagia Postoperative diagnosis: EGD: cough and dysphagia :  Dr. Gianni Rubio Assistant(s): Endoscopy Technician-1: Jordi Wright Endoscopy RN-1: Dash Storey RN Specimens:  
ID Type Source Tests Collected by Time Destination 1 : Bx Preservative GE Matt Calero MD 10/10/2019 1106 Pathology H. Pylori  no Assessment: 
Intra-procedure medications Anesthesia gave intra-procedure sedation and medications, see anesthesia flow sheet yes Intravenous fluids: NS@ Jennifer Banco Vital signs stable Abdominal assessment: round and soft Recommendation: 
Discharge patient per MD order. Family or Jed Mendoza, son Permission to share finding with family or friend yes

## 2020-01-16 ENCOUNTER — OFFICE VISIT (OUTPATIENT)
Dept: FAMILY MEDICINE CLINIC | Age: 80
End: 2020-01-16

## 2020-01-16 VITALS
DIASTOLIC BLOOD PRESSURE: 66 MMHG | SYSTOLIC BLOOD PRESSURE: 130 MMHG | HEART RATE: 80 BPM | TEMPERATURE: 96.4 F | OXYGEN SATURATION: 95 % | BODY MASS INDEX: 26.89 KG/M2 | WEIGHT: 171.3 LBS | RESPIRATION RATE: 20 BRPM | HEIGHT: 67 IN

## 2020-01-16 DIAGNOSIS — N18.30 CKD (CHRONIC KIDNEY DISEASE) STAGE 3, GFR 30-59 ML/MIN (HCC): ICD-10-CM

## 2020-01-16 DIAGNOSIS — D69.6 PLATELETS DECREASED (HCC): ICD-10-CM

## 2020-01-16 DIAGNOSIS — E53.8 B12 DEFICIENCY: ICD-10-CM

## 2020-01-16 DIAGNOSIS — K21.9 CHRONIC GERD: Primary | ICD-10-CM

## 2020-01-16 DIAGNOSIS — Z13.5 SCREENING FOR GLAUCOMA: ICD-10-CM

## 2020-01-16 DIAGNOSIS — E53.8 VITAMIN B12 DEFICIENCY NEUROPATHY (HCC): ICD-10-CM

## 2020-01-16 DIAGNOSIS — J41.8 MIXED SIMPLE AND MUCOPURULENT CHRONIC BRONCHITIS (HCC): ICD-10-CM

## 2020-01-16 DIAGNOSIS — Z23 ENCOUNTER FOR IMMUNIZATION: ICD-10-CM

## 2020-01-16 DIAGNOSIS — G63 VITAMIN B12 DEFICIENCY NEUROPATHY (HCC): ICD-10-CM

## 2020-01-16 DIAGNOSIS — I10 HTN, GOAL BELOW 140/90: ICD-10-CM

## 2020-01-16 DIAGNOSIS — N17.9 ACUTE RENAL FAILURE, UNSPECIFIED ACUTE RENAL FAILURE TYPE (HCC): ICD-10-CM

## 2020-01-16 DIAGNOSIS — I25.110 CORONARY ARTERY DISEASE INVOLVING NATIVE HEART WITH UNSTABLE ANGINA PECTORIS, UNSPECIFIED VESSEL OR LESION TYPE (HCC): ICD-10-CM

## 2020-01-16 RX ORDER — ROSUVASTATIN CALCIUM 20 MG/1
20 TABLET, COATED ORAL
Qty: 90 TAB | Refills: 2 | Status: SHIPPED | OUTPATIENT
Start: 2020-01-16 | End: 2020-09-16 | Stop reason: SDUPTHER

## 2020-01-16 RX ORDER — OMEPRAZOLE 40 MG/1
40 CAPSULE, DELAYED RELEASE ORAL DAILY
Qty: 90 CAP | Refills: 2 | Status: SHIPPED | OUTPATIENT
Start: 2020-01-16 | End: 2020-12-01

## 2020-01-16 RX ORDER — OMEPRAZOLE 20 MG/1
20 CAPSULE, DELAYED RELEASE ORAL DAILY
COMMUNITY
End: 2020-01-16 | Stop reason: SDUPTHER

## 2020-01-16 RX ORDER — LOSARTAN POTASSIUM AND HYDROCHLOROTHIAZIDE 25; 100 MG/1; MG/1
TABLET ORAL
Qty: 90 TAB | Refills: 3
Start: 2020-01-16 | End: 2020-06-10

## 2020-01-16 NOTE — PATIENT INSTRUCTIONS
htn DASH Diet: Care Instructions Your Care Instructions The DASH diet is an eating plan that can help lower your blood pressure. DASH stands for Dietary Approaches to Stop Hypertension. Hypertension is high blood pressure. The DASH diet focuses on eating foods that are high in calcium, potassium, and magnesium. These nutrients can lower blood pressure. The foods that are highest in these nutrients are fruits, vegetables, low-fat dairy products, nuts, seeds, and legumes. But taking calcium, potassium, and magnesium supplements instead of eating foods that are high in those nutrients does not have the same effect. The DASH diet also includes whole grains, fish, and poultry. The DASH diet is one of several lifestyle changes your doctor may recommend to lower your high blood pressure. Your doctor may also want you to decrease the amount of sodium in your diet. Lowering sodium while following the DASH diet can lower blood pressure even further than just the DASH diet alone. Follow-up care is a key part of your treatment and safety. Be sure to make and go to all appointments, and call your doctor if you are having problems. It's also a good idea to know your test results and keep a list of the medicines you take. How can you care for yourself at home? Following the DASH diet · Eat 4 to 5 servings of fruit each day. A serving is 1 medium-sized piece of fruit, ½ cup chopped or canned fruit, 1/4 cup dried fruit, or 4 ounces (½ cup) of fruit juice. Choose fruit more often than fruit juice. · Eat 4 to 5 servings of vegetables each day. A serving is 1 cup of lettuce or raw leafy vegetables, ½ cup of chopped or cooked vegetables, or 4 ounces (½ cup) of vegetable juice. Choose vegetables more often than vegetable juice. · Get 2 to 3 servings of low-fat and fat-free dairy each day. A serving is 8 ounces of milk, 1 cup of yogurt, or 1 ½ ounces of cheese. · Eat 6 to 8 servings of grains each day. A serving is 1 slice of bread, 1 ounce of dry cereal, or ½ cup of cooked rice, pasta, or cooked cereal. Try to choose whole-grain products as much as possible. · Limit lean meat, poultry, and fish to 2 servings each day. A serving is 3 ounces, about the size of a deck of cards. · Eat 4 to 5 servings of nuts, seeds, and legumes (cooked dried beans, lentils, and split peas) each week. A serving is 1/3 cup of nuts, 2 tablespoons of seeds, or ½ cup of cooked beans or peas. · Limit fats and oils to 2 to 3 servings each day. A serving is 1 teaspoon of vegetable oil or 2 tablespoons of salad dressing. · Limit sweets and added sugars to 5 servings or less a week. A serving is 1 tablespoon jelly or jam, ½ cup sorbet, or 1 cup of lemonade. · Eat less than 2,300 milligrams (mg) of sodium a day. If you limit your sodium to 1,500 mg a day, you can lower your blood pressure even more. Tips for success · Start small. Do not try to make dramatic changes to your diet all at once. You might feel that you are missing out on your favorite foods and then be more likely to not follow the plan. Make small changes, and stick with them. Once those changes become habit, add a few more changes. · Try some of the following: ? Make it a goal to eat a fruit or vegetable at every meal and at snacks. This will make it easy to get the recommended amount of fruits and vegetables each day. ? Try yogurt topped with fruit and nuts for a snack or healthy dessert. ? Add lettuce, tomato, cucumber, and onion to sandwiches. ? Combine a ready-made pizza crust with low-fat mozzarella cheese and lots of vegetable toppings. Try using tomatoes, squash, spinach, broccoli, carrots, cauliflower, and onions. ? Have a variety of cut-up vegetables with a low-fat dip as an appetizer instead of chips and dip. ? Sprinkle sunflower seeds or chopped almonds over salads.  Or try adding chopped walnuts or almonds to cooked vegetables. ? Try some vegetarian meals using beans and peas. Add garbanzo or kidney beans to salads. Make burritos and tacos with mashed leigh beans or black beans. Where can you learn more? Go to http://julisa-davide.info/. Enter Z769 in the search box to learn more about \"DASH Diet: Care Instructions. \" Current as of: April 9, 2019 Content Version: 12.2 © 4553-8972 [employer]. Care instructions adapted under license by [employer] (which disclaims liability or warranty for this information). If you have questions about a medical condition or this instruction, always ask your healthcare professional. [address] any warranty or liability for your use of this information.

## 2020-01-16 NOTE — PROGRESS NOTES
Name and  verified      Chief Complaint   Patient presents with    Hypertension     Follow Up         Health Maintenance reviewed-discussed with patient. 1. Have you been to the ER, urgent care clinic since your last visit? Hospitalized since your last visit? no    2. Have you seen or consulted any other health care providers outside of the [address] [place] [address] since your last visit? Include any pap smears or colon screening.   no

## 2020-01-16 NOTE — PROGRESS NOTES
HISTORY OF PRESENT ILLNESS  Darien Hernandez is a [de-identified] y.o. male. HPI   Acid Reflux    Patient states that pt has been dealing with this discomfort for almost <2 years, has not been taking the PPI  pt states that is getting some heart burn since if the patient does not take any over-the-counter anti-gas medications,  in addition, states that the discomfort worsen by fatty/spicy  Foods, and if the patient eats late or if the patient overeats and denies hematchezia or hematemesis, patient never had a previous colonoscopy,   HTN  Today pt present for Bp check and++= Compliancy w/ the bp meds, having had the low salt diet ,  has been active, patien does not obtain the bp at home ,, today the pt denies Chest Pain, has no legs swelling no lightheadedness,  In addition patient has history of hypercholesterolemia has been compliant with the statin therapy denies any muscle ache, currently taking the medication nightly last lipid panel was reviewed and was normal ++++refills needed at this time has been trying to have a low-fat low-cholesterol diet sometimes likes the fast foods weight has been stable      Current Outpatient Medications   Medication Sig Dispense Refill    omeprazole (PRILOSEC) 20 mg capsule Take 20 mg by mouth daily.  losartan-hydroCHLOROthiazide (HYZAAR) 100-25 mg per tablet TAKE 1 TABLET EVERY DAY FOR HYPERTENSION 90 Tab 3    rosuvastatin (CRESTOR) 20 mg tablet Take 1 Tab by mouth nightly. 90 Tab 2    ibuprofen (ADVIL) 200 mg tablet Take 3 Tabs by mouth every eight (8) hours as needed for Pain. 40 Tab 0    cholecalciferol (VITAMIN D3) 1,000 unit tablet Take 1 Tab by mouth daily. 30 Tab 11    aspirin 81 mg tablet Take 81 mg by mouth daily.  isosorbide mononitrate ER (IMDUR) 30 mg tablet Take 1 Tab by mouth daily. 90 Tab 3    calcium-cholecalciferol, D3, (CALTRATE 600+D) tablet Take 1 Tab by mouth daily. 60 Tab 11    clemastine 2.68 mg tab tablet Take  by mouth two (2) times a day.       ipratropium (ATROVENT HFA) 17 mcg/actuation inhaler Take 1 Puff by inhalation every six (6) hours as needed for Wheezing. (Patient not taking: Reported on 6/17/2019) 1 Inhaler 5    raNITIdine (ZANTAC) 150 mg tablet Take 1 Tab by mouth nightly. 30 Tab 0    ferrous sulfate (IRON) 325 mg (65 mg iron) EC tablet Take 1 Tab by mouth Daily (before breakfast). (Patient not taking: Reported on 6/17/2019) 90 Tab 3    nitroglycerin (NITROSTAT) 0.4 mg SL tablet 1 Tab by SubLINGual route every five (5) minutes as needed for Chest Pain. Up to 3 doses. (Patient not taking: Reported on 6/17/2019) 30 Tab 3    COLACE 100 mg capsule Take 100 mg by mouth daily as needed.  vitamins a & d cap       psyllium (METAMUCIL) powd One cup w/ 12 oz of water twice daily and hold for loose stool 660 g 6    ondansetron (ZOFRAN ODT) 4 mg disintegrating tablet Take 1 Tab by mouth every eight (8) hours as needed for Nausea. (Patient not taking: Reported on 1/3/2018) 10 Tab 0    vitamin e (E GEMS) 1,000 unit capsule Take 1,000 Units by mouth daily.  MULTIVITAMIN (MULTIPLE VITAMIN PO) Take 1 Tab by mouth daily.        Allergies   Allergen Reactions    Ace Inhibitors Cough     Past Medical History:   Diagnosis Date    Acute bronchitis 3/16/2016    Back pain, chronic 11/7/2014    Breast lump on left side at 11 o'clock position 5/7/2015    CAD (coronary artery disease)     Cat scratch of right forearm with infection 6/30/2016    Chronic bronchitis (HCC) 4/5/2016    Chronic idiopathic constipation 4/17/2017    Gastroesophageal reflux disease without esophagitis 4/17/2017    H/O asbestos exposure 4/5/2016    H/O asbestos exposure 4/5/2016    Hypercholesterolemia     Hypertension     Ketonuria 4/17/2017    Need for diphtheria-tetanus-pertussis (Tdap) vaccine, adult/adolescent 4/4/2014    Need for pneumococcal vaccination 4/4/2014    Needs flu shot 4/4/2014    S/P CABG x 3     2001    Swelling of right hand 7/7/2016    T12 compression fracture ([facility] [address] 11/10/2014     Past Surgical History:   Procedure Laterality Date    CARDIAC SURG PROCEDURE UNLIST      CABG    CARDIAC SURG PROCEDURE UNLIST  2000    bypass    UPPER GI ENDOSCOPY,BIOPSY  10/10/2019         UPPER GI ENDOSCOPY,DILATN W GUIDE  10/10/2019          Family History   Problem Relation Age of Onset    Heart Disease Mother     Cancer Father      Social History     Tobacco Use    Smoking status: Never Smoker    Smokeless tobacco: Never Used   Substance Use Topics    Alcohol use: Yes     Alcohol/week: 0.0 standard drinks     Comment: occasionally      Lab Results   Component Value Date/Time    WBC 5.4 06/10/2019 08:37 AM    HGB 13.5 06/10/2019 08:37 AM    HCT 41.1 06/10/2019 08:37 AM    PLATELET 400 (L) 77/13/2008 08:37 AM    MCV 98 (H) 06/10/2019 08:37 AM     Lab Results   Component Value Date/Time    Hemoglobin A1c 5.6 06/10/2019 08:37 AM    Hemoglobin A1c 5.7 (H) 11/07/2014 12:59 PM    Hemoglobin A1c 5.7 (H) 04/04/2014 10:48 AM    Glucose 91 06/10/2019 08:37 AM    Glucose POC 93mg/dl 01/14/2013 02:35 PM    LDL, calculated 52 06/10/2019 08:37 AM    Creatinine (POC) 1.2 04/18/2016 09:02 AM    Creatinine 1.54 (H) 06/10/2019 08:37 AM      Lab Results   Component Value Date/Time    TSH 1.360 06/10/2019 08:37 AM         Review of Systems   Constitutional: Negative for chills and fever. HENT: Negative for ear pain and nosebleeds. Eyes: Negative for blurred vision, pain and discharge. Respiratory: Negative for shortness of breath. Cardiovascular: Negative for chest pain and leg swelling. Gastrointestinal: Negative for constipation, diarrhea, nausea and vomiting. Genitourinary: Negative for frequency. Musculoskeletal: Negative for joint pain. Skin: Negative for itching and rash. Neurological: Negative for headaches. Psychiatric/Behavioral: Negative for depression. The patient is not nervous/anxious.         Physical Exam  Vitals signs and nursing note reviewed. Constitutional:       Appearance: He is well-developed. HENT:      Head: Normocephalic and atraumatic. Mouth/Throat:      Pharynx: No oropharyngeal exudate. Eyes:      Conjunctiva/sclera: Conjunctivae normal.   Neck:      Musculoskeletal: Normal range of motion and neck supple. Cardiovascular:      Rate and Rhythm: Normal rate and regular rhythm. Heart sounds: Normal heart sounds. No murmur. Pulmonary:      Effort: Pulmonary effort is normal. No respiratory distress. Breath sounds: Normal breath sounds. Abdominal:      General: Bowel sounds are normal. There is no distension. Palpations: Abdomen is soft. Tenderness: There is no rebound. Musculoskeletal:         General: No tenderness. Skin:     General: Skin is warm. Findings: No erythema. Neurological:      Mental Status: He is alert and oriented to person, place, and time. Psychiatric:         Behavior: Behavior normal.         ASSESSMENT and PLAN  Diagnoses and all orders for this visit:    1. Chronic GERD  -     omeprazole (PRILOSEC) 40 mg capsule; Take 1 Cap by mouth daily.  -     CBC W/O DIFF  -     METABOLIC PANEL, COMPREHENSIVE  -     LIPID PANEL  -     HEMOGLOBIN A1C WITH EAG  -     VITAMIN B12 & FOLATE  -     VITAMIN D, 25 HYDROXY  -     TSH 3RD GENERATION    2. Screening for glaucoma  -     REFERRAL TO OPTOMETRY  -     CBC W/O DIFF  -     METABOLIC PANEL, COMPREHENSIVE  -     LIPID PANEL  -     HEMOGLOBIN A1C WITH EAG  -     VITAMIN B12 & FOLATE  -     VITAMIN D, 25 HYDROXY  -     TSH 3RD GENERATION    3. Encounter for immunization  -     varicella-zoster recombinant, PF, (SHINGRIX) 50 mcg/0.5 mL susr injection; 0.5 mL by IntraMUSCular route once for 1 dose. -     CBC W/O DIFF  -     METABOLIC PANEL, COMPREHENSIVE  -     LIPID PANEL  -     HEMOGLOBIN A1C WITH EAG  -     VITAMIN B12 & FOLATE  -     VITAMIN D, 25 HYDROXY  -     TSH 3RD GENERATION    4.  [facility]  - CBC W/O DIFF  -     METABOLIC PANEL, COMPREHENSIVE  -     LIPID PANEL  -     HEMOGLOBIN A1C WITH EAG  -     VITAMIN B12 & FOLATE  -     VITAMIN D, 25 HYDROXY  -     TSH 3RD GENERATION    5. Platelets decreased (HCC)  -     CBC W/O DIFF  -     METABOLIC PANEL, COMPREHENSIVE  -     LIPID PANEL  -     HEMOGLOBIN A1C WITH EAG  -     VITAMIN B12 & FOLATE  -     VITAMIN D, 25 HYDROXY  -     TSH 3RD GENERATION  -     rosuvastatin (CRESTOR) 20 mg tablet; Take 1 Tab by mouth nightly. -     losartan-hydroCHLOROthiazide (HYZAAR) 100-25 mg per tablet; TAKE 1 TABLET EVERY DAY FOR HYPERTENSION    6. CKD (chronic kidney disease) stage 3, GFR 30-59 ml/min (HCC)  -     CBC W/O DIFF  -     METABOLIC PANEL, COMPREHENSIVE  -     LIPID PANEL  -     HEMOGLOBIN A1C WITH EAG  -     VITAMIN B12 & FOLATE  -     VITAMIN D, 25 HYDROXY  -     TSH 3RD GENERATION  -     rosuvastatin (CRESTOR) 20 mg tablet; Take 1 Tab by mouth nightly. -     losartan-hydroCHLOROthiazide (HYZAAR) 100-25 mg per tablet; TAKE 1 TABLET EVERY DAY FOR HYPERTENSION    7. Mixed simple and mucopurulent chronic bronchitis (HCC)  -     CBC W/O DIFF  -     METABOLIC PANEL, COMPREHENSIVE  -     LIPID PANEL  -     HEMOGLOBIN A1C WITH EAG  -     VITAMIN B12 & FOLATE  -     VITAMIN D, 25 HYDROXY  -     TSH 3RD GENERATION    8. HTN, goal below 140/90  -     CBC W/O DIFF  -     METABOLIC PANEL, COMPREHENSIVE  -     LIPID PANEL  -     HEMOGLOBIN A1C WITH EAG  -     VITAMIN B12 & FOLATE  -     VITAMIN D, 25 HYDROXY  -     TSH 3RD GENERATION    9. Acute renal failure, unspecified acute renal failure type ([facility] [address]  -     CBC W/O DIFF  -     METABOLIC PANEL, COMPREHENSIVE  -     LIPID PANEL  -     HEMOGLOBIN A1C WITH EAG  -     VITAMIN B12 & FOLATE  -     VITAMIN D, 25 HYDROXY  -     TSH 3RD GENERATION  -     rosuvastatin (CRESTOR) 20 mg tablet; Take 1 Tab by mouth nightly. -     losartan-hydroCHLOROthiazide (HYZAAR) 100-25 mg per tablet; TAKE 1 TABLET EVERY DAY FOR HYPERTENSION    10. B12 deficiency  -     CBC W/O DIFF  -     METABOLIC PANEL, COMPREHENSIVE  -     LIPID PANEL  -     HEMOGLOBIN A1C WITH EAG  -     VITAMIN B12 & FOLATE  -     VITAMIN D, 25 HYDROXY  -     TSH 3RD GENERATION  -     rosuvastatin (CRESTOR) 20 mg tablet; Take 1 Tab by mouth nightly. -     losartan-hydroCHLOROthiazide (HYZAAR) 100-25 mg per tablet; TAKE 1 TABLET EVERY DAY FOR HYPERTENSION    11. Coronary artery disease involving native heart with unstable angina pectoris, unspecified vessel or lesion type (HCC)  -     CBC W/O DIFF  -     METABOLIC PANEL, COMPREHENSIVE  -     LIPID PANEL  -     HEMOGLOBIN A1C WITH EAG  -     VITAMIN B12 & FOLATE  -     VITAMIN D, 25 HYDROXY  -     TSH 3RD GENERATION  -     rosuvastatin (CRESTOR) 20 mg tablet; Take 1 Tab by mouth nightly. -     losartan-hydroCHLOROthiazide (HYZAAR) 100-25 mg per tablet; TAKE 1 TABLET EVERY DAY FOR HYPERTENSION      Bp, ldl are at target goal, the appropriate diet discussed. lifelong compliance to reduce risk is stressed. A regular exercise program,  maintain normal body weight, fitness,  to avoid fast food Advised, recheck for flp and lft in 3 months rtc fasting, meds side effect and compliance advised.

## 2020-01-17 LAB
25(OH)D3+25(OH)D2 SERPL-MCNC: 47.7 NG/ML (ref 30–100)
ALBUMIN SERPL-MCNC: 4.5 G/DL (ref 3.5–4.7)
ALBUMIN/GLOB SERPL: 2 {RATIO} (ref 1.2–2.2)
ALP SERPL-CCNC: 58 IU/L (ref 39–117)
ALT SERPL-CCNC: 20 IU/L (ref 0–44)
AST SERPL-CCNC: 26 IU/L (ref 0–40)
BILIRUB SERPL-MCNC: 1.5 MG/DL (ref 0–1.2)
BUN SERPL-MCNC: 18 MG/DL (ref 8–27)
BUN/CREAT SERPL: 15 (ref 10–24)
CALCIUM SERPL-MCNC: 10.2 MG/DL (ref 8.6–10.2)
CHLORIDE SERPL-SCNC: 101 MMOL/L (ref 96–106)
CHOLEST SERPL-MCNC: 137 MG/DL (ref 100–199)
CO2 SERPL-SCNC: 27 MMOL/L (ref 20–29)
CREAT SERPL-MCNC: 1.23 MG/DL (ref 0.76–1.27)
ERYTHROCYTE [DISTWIDTH] IN BLOOD BY AUTOMATED COUNT: 11.8 % (ref 11.6–15.4)
EST. AVERAGE GLUCOSE BLD GHB EST-MCNC: 117 MG/DL
FOLATE SERPL-MCNC: 19.1 NG/ML
GLOBULIN SER CALC-MCNC: 2.3 G/DL (ref 1.5–4.5)
GLUCOSE SERPL-MCNC: 104 MG/DL (ref 65–99)
HBA1C MFR BLD: 5.7 % (ref 4.8–5.6)
HCT VFR BLD AUTO: 42.9 % (ref 37.5–51)
HDLC SERPL-MCNC: 59 MG/DL
HGB BLD-MCNC: 14.5 G/DL (ref 13–17.7)
INTERPRETATION: NORMAL
LDLC SERPL CALC-MCNC: 56 MG/DL (ref 0–99)
MCH RBC QN AUTO: 33.3 PG (ref 26.6–33)
MCHC RBC AUTO-ENTMCNC: 33.8 G/DL (ref 31.5–35.7)
MCV RBC AUTO: 99 FL (ref 79–97)
PLATELET # BLD AUTO: 130 X10E3/UL (ref 150–450)
POTASSIUM SERPL-SCNC: 4.2 MMOL/L (ref 3.5–5.2)
PROT SERPL-MCNC: 6.8 G/DL (ref 6–8.5)
RBC # BLD AUTO: 4.35 X10E6/UL (ref 4.14–5.8)
SODIUM SERPL-SCNC: 143 MMOL/L (ref 134–144)
TRIGL SERPL-MCNC: 112 MG/DL (ref 0–149)
TSH SERPL DL<=0.005 MIU/L-ACNC: 1.2 UIU/ML (ref 0.45–4.5)
VIT B12 SERPL-MCNC: 1042 PG/ML (ref 232–1245)
VLDLC SERPL CALC-MCNC: 22 MG/DL (ref 5–40)
WBC # BLD AUTO: 7.2 X10E3/UL (ref 3.4–10.8)

## 2020-02-25 ENCOUNTER — OFFICE VISIT (OUTPATIENT)
Dept: FAMILY MEDICINE CLINIC | Age: 80
End: 2020-02-25

## 2020-02-25 VITALS
RESPIRATION RATE: 20 BRPM | BODY MASS INDEX: 26.98 KG/M2 | HEART RATE: 62 BPM | DIASTOLIC BLOOD PRESSURE: 62 MMHG | OXYGEN SATURATION: 99 % | SYSTOLIC BLOOD PRESSURE: 125 MMHG | HEIGHT: 67 IN | TEMPERATURE: 96.1 F | WEIGHT: 171.9 LBS

## 2020-02-25 DIAGNOSIS — Z23 ENCOUNTER FOR IMMUNIZATION: ICD-10-CM

## 2020-02-25 DIAGNOSIS — R30.0 BURNING WITH URINATION: Primary | ICD-10-CM

## 2020-02-25 LAB
BILIRUB UR QL STRIP: NEGATIVE
GLUCOSE UR-MCNC: NEGATIVE MG/DL
KETONES P FAST UR STRIP-MCNC: NEGATIVE MG/DL
PH UR STRIP: 6.5 [PH] (ref 4.6–8)
PROT UR QL STRIP: NEGATIVE
SP GR UR STRIP: 1.02 (ref 1–1.03)
UA UROBILINOGEN AMB POC: NORMAL (ref 0.2–1)
URINALYSIS CLARITY POC: CLEAR
URINALYSIS COLOR POC: YELLOW
URINE BLOOD POC: NORMAL
URINE LEUKOCYTES POC: NEGATIVE
URINE NITRITES POC: NEGATIVE

## 2020-02-25 RX ORDER — CIPROFLOXACIN 500 MG/1
500 TABLET ORAL 2 TIMES DAILY
Qty: 20 TAB | Refills: 0 | Status: SHIPPED | OUTPATIENT
Start: 2020-02-25 | End: 2020-03-06

## 2020-02-25 RX ORDER — TAMSULOSIN HYDROCHLORIDE 0.4 MG/1
0.4 CAPSULE ORAL DAILY
Qty: 30 CAP | Refills: 0 | Status: SHIPPED | OUTPATIENT
Start: 2020-02-25 | End: 2020-09-16 | Stop reason: ALTCHOICE

## 2020-02-25 NOTE — PROGRESS NOTES
Name and  verified      Chief Complaint   Patient presents with    Bladder Infection         Health Maintenance reviewed-discussed with patient. 1. Have you been to the ER, urgent care clinic since your last visit? Hospitalized since your last visit? no    2. Have you seen or consulted any other health care providers outside of the [address] [place] [address] since your last visit? Include any pap smears or colon screening.   no

## 2020-02-25 NOTE — PATIENT INSTRUCTIONS
Benign Prostatic Hyperplasia: Care Instructions Your Care Instructions Benign prostatic hyperplasia, or BPH, is an enlarged prostate gland. The prostate is a small gland that makes some of the fluid in semen. Prostate enlargement happens to almost all men as they age. It is usually not serious. BPH does not cause prostate cancer. As the prostate gets bigger, it may partly block the flow of urine. You may have a hard time getting a urine stream started or completely stopped. BPH can cause dribbling. You may have a weak urine stream, or you may have to urinate more often than you used to, especially at night. Most men find these problems easy to manage. You do not need treatment unless your symptoms bother you a lot or you have other problems, such as bladder infections or stones. In these cases, medicines may help. Surgery is not needed unless the urine flow is blocked or the symptoms do not get better with medicine. Follow-up care is a key part of your treatment and safety. Be sure to make and go to all appointments, and call your doctor if you are having problems. It's also a good idea to know your test results and keep a list of the medicines you take. How can you care for yourself at home? · Take plenty of time to urinate. Try to relax. · Try \"double voiding. \" Urinate as much you can, relax for a few moments, and then try to urinate again. · Sit on the toilet to urinate. · Read or think of other things while you are waiting. · Turn on a faucet, or try to picture running water. Some men find that this helps get their urine flowing. · If dribbling is a problem, wash your penis daily to avoid skin irritation and infection. · Avoid caffeine and alcohol. These drinks will increase how often you need to urinate. Spread your fluid intake throughout the day. If the urge to urinate often wakes you at night, limit your fluid intake in the evening. Urinate right before you go to bed. · Many over-the-counter cold and allergy medicines can make the symptoms of BPH worse. Avoid antihistamines, decongestants, and allergy pills, if you can. Read the warnings on the package. · If you take any prescription medicines, especially tranquilizers or antidepressants, ask your doctor or pharmacist whether they can cause urination problems. There may be other medicines you can use that do not cause urinary problems. · Be safe with medicines. Take your medicines exactly as prescribed. Call your doctor if you think you are having a problem with your medicine. When should you call for help? Call your doctor now or seek immediate medical care if: 
  · You cannot urinate at all.  
  · You have symptoms of a urinary infection. For example: ? You have blood or pus in your urine. ? You have pain in your back just below your rib cage. This is called flank pain. ? You have a fever, chills, or body aches. ? It hurts to urinate. ? You have groin or belly pain.  
 Watch closely for changes in your health, and be sure to contact your doctor if: 
  · It hurts when you ejaculate.  
  · Your urinary problems get a lot worse or bother you a lot. Where can you learn more? Go to http://julisa-davide.info/. Enter J381 in the search box to learn more about \"Benign Prostatic Hyperplasia: Care Instructions. \" Current as of: May 28, 2019 Content Version: 12.2 © 9528-8640 [employer]. Care instructions adapted under license by [employer] (which disclaims liability or warranty for this information). If you have questions about a medical condition or this instruction, always ask your healthcare professional. [address] any warranty or liability for your use of this information.

## 2020-06-09 DIAGNOSIS — E53.8 B12 DEFICIENCY: ICD-10-CM

## 2020-06-09 DIAGNOSIS — I25.110 CORONARY ARTERY DISEASE INVOLVING NATIVE HEART WITH UNSTABLE ANGINA PECTORIS, UNSPECIFIED VESSEL OR LESION TYPE (HCC): ICD-10-CM

## 2020-06-09 DIAGNOSIS — N18.30 CKD (CHRONIC KIDNEY DISEASE) STAGE 3, GFR 30-59 ML/MIN (HCC): ICD-10-CM

## 2020-06-09 DIAGNOSIS — D69.6 PLATELETS DECREASED (HCC): ICD-10-CM

## 2020-06-09 DIAGNOSIS — N17.9 ACUTE RENAL FAILURE, UNSPECIFIED ACUTE RENAL FAILURE TYPE (HCC): ICD-10-CM

## 2020-06-10 RX ORDER — LOSARTAN POTASSIUM AND HYDROCHLOROTHIAZIDE 25; 100 MG/1; MG/1
TABLET ORAL
Qty: 90 TAB | Refills: 3 | Status: SHIPPED | OUTPATIENT
Start: 2020-06-10 | End: 2020-09-16 | Stop reason: SDUPTHER

## 2020-09-16 ENCOUNTER — VIRTUAL VISIT (OUTPATIENT)
Dept: FAMILY MEDICINE CLINIC | Age: 80
End: 2020-09-16
Payer: MEDICARE

## 2020-09-16 DIAGNOSIS — D69.6 PLATELETS DECREASED (HCC): ICD-10-CM

## 2020-09-16 DIAGNOSIS — N18.30 CKD (CHRONIC KIDNEY DISEASE) STAGE 3, GFR 30-59 ML/MIN (HCC): ICD-10-CM

## 2020-09-16 DIAGNOSIS — N52.8 OTHER MALE ERECTILE DYSFUNCTION: ICD-10-CM

## 2020-09-16 DIAGNOSIS — I25.110 CORONARY ARTERY DISEASE INVOLVING NATIVE HEART WITH UNSTABLE ANGINA PECTORIS, UNSPECIFIED VESSEL OR LESION TYPE (HCC): ICD-10-CM

## 2020-09-16 DIAGNOSIS — Z23 ENCOUNTER FOR IMMUNIZATION: ICD-10-CM

## 2020-09-16 DIAGNOSIS — K59.04 CHRONIC IDIOPATHIC CONSTIPATION: ICD-10-CM

## 2020-09-16 DIAGNOSIS — Z00.00 MEDICARE ANNUAL WELLNESS VISIT, SUBSEQUENT: Primary | ICD-10-CM

## 2020-09-16 DIAGNOSIS — E53.8 B12 DEFICIENCY: ICD-10-CM

## 2020-09-16 DIAGNOSIS — Z13.31 SCREENING FOR DEPRESSION: ICD-10-CM

## 2020-09-16 DIAGNOSIS — K21.9 GASTROESOPHAGEAL REFLUX DISEASE WITHOUT ESOPHAGITIS: ICD-10-CM

## 2020-09-16 DIAGNOSIS — N17.9 ACUTE RENAL FAILURE, UNSPECIFIED ACUTE RENAL FAILURE TYPE (HCC): ICD-10-CM

## 2020-09-16 DIAGNOSIS — Z71.89 ADVANCED DIRECTIVES, COUNSELING/DISCUSSION: ICD-10-CM

## 2020-09-16 PROCEDURE — G0008 ADMIN INFLUENZA VIRUS VAC: HCPCS | Performed by: FAMILY MEDICINE

## 2020-09-16 PROCEDURE — 1101F PT FALLS ASSESS-DOCD LE1/YR: CPT | Performed by: FAMILY MEDICINE

## 2020-09-16 PROCEDURE — G0444 DEPRESSION SCREEN ANNUAL: HCPCS | Performed by: FAMILY MEDICINE

## 2020-09-16 PROCEDURE — 99497 ADVNCD CARE PLAN 30 MIN: CPT | Performed by: FAMILY MEDICINE

## 2020-09-16 PROCEDURE — G8428 CUR MEDS NOT DOCUMENT: HCPCS | Performed by: FAMILY MEDICINE

## 2020-09-16 PROCEDURE — G9717 DOC PT DX DEP/BP F/U NT REQ: HCPCS | Performed by: FAMILY MEDICINE

## 2020-09-16 PROCEDURE — G8536 NO DOC ELDER MAL SCRN: HCPCS | Performed by: FAMILY MEDICINE

## 2020-09-16 PROCEDURE — G0439 PPPS, SUBSEQ VISIT: HCPCS | Performed by: FAMILY MEDICINE

## 2020-09-16 PROCEDURE — G8756 NO BP MEASURE DOC: HCPCS | Performed by: FAMILY MEDICINE

## 2020-09-16 PROCEDURE — 99213 OFFICE O/P EST LOW 20 MIN: CPT | Performed by: FAMILY MEDICINE

## 2020-09-16 PROCEDURE — G8419 CALC BMI OUT NRM PARAM NOF/U: HCPCS | Performed by: FAMILY MEDICINE

## 2020-09-16 RX ORDER — ISOSORBIDE MONONITRATE 30 MG/1
30 TABLET, EXTENDED RELEASE ORAL DAILY
Qty: 90 TAB | Refills: 3
Start: 2020-09-16

## 2020-09-16 RX ORDER — LOSARTAN POTASSIUM AND HYDROCHLOROTHIAZIDE 25; 100 MG/1; MG/1
TABLET ORAL
Qty: 90 TAB | Refills: 3
Start: 2020-09-16 | End: 2021-05-24

## 2020-09-16 RX ORDER — ROSUVASTATIN CALCIUM 20 MG/1
20 TABLET, COATED ORAL
Qty: 90 TAB | Refills: 2
Start: 2020-09-16 | End: 2020-12-01

## 2020-09-16 NOTE — PATIENT INSTRUCTIONS
Medicare Wellness Visit, Male The best way to live healthy is to have a lifestyle where you eat a well-balanced diet, exercise regularly, limit alcohol use, and quit all forms of tobacco/nicotine, if applicable. Regular preventive services are another way to keep healthy. Preventive services (vaccines, screening tests, monitoring & exams) can help personalize your care plan, which helps you manage your own care. Screening tests can find health problems at the earliest stages, when they are easiest to treat. [facility] follows the current, evidence-based guidelines published by the [facility] Vincenzo ([facility]) when recommending preventive services for our patients. Because we follow these guidelines, sometimes recommendations change over time as research supports it. (For example, a prostate screening blood test is no longer routinely recommended for men with no symptoms). Of course, you and your doctor may decide to screen more often for some diseases, based on your risk and co-morbidities (chronic disease you are already diagnosed with). Preventive services for you include: - Medicare offers their members a free annual wellness visit, which is time for you and your primary care provider to discuss and plan for your preventive service needs. Take advantage of this benefit every year! 
-All adults over age 72 should receive the recommended pneumonia vaccines. Current USPSTF guidelines recommend a series of two vaccines for the best pneumonia protection.  
-All adults should have a flu vaccine yearly and tetanus vaccine every 10 years. 
-All adults age 48 and older should receive the shingles vaccines (series of two vaccines).       
-All adults age 38-68 who are overweight should have a diabetes screening test once every three years.  
-Other screening tests & preventive services for persons with diabetes include: an eye exam to screen for diabetic retinopathy, a kidney function test, a foot exam, and stricter control over your cholesterol.  
-Cardiovascular screening for adults with routine risk involves an electrocardiogram (ECG) at intervals determined by the provider.  
-Colorectal cancer screening should be done for adults age 54-65 with no increased risk factors for colorectal cancer. There are a number of acceptable methods of screening for this type of cancer. Each test has its own benefits and drawbacks. Discuss with your provider what is most appropriate for you during your annual wellness visit. The different tests include: colonoscopy (considered the best screening method), a fecal occult blood test, a fecal DNA test, and sigmoidoscopy. 
-All adults born between [facility] should be screened once for Hepatitis C. 
-An Abdominal Aortic Aneurysm (AAA) Screening is recommended for men age 73-68 who has ever smoked in their lifetime. Here is a list of your current Health Maintenance items (your personalized list of preventive services) with a due date: 
Health Maintenance Due Topic Date Due  Shingles Vaccine (1 of 2) 03/15/1989  Pneumococcal Vaccine (1 of 1 - PPSV23) 03/15/2004  Glaucoma Screening   02/28/2016 Caratre Yordan Annual Well Visit  06/17/2020  Yearly Flu Vaccine (1) 09/01/2020 Jaylen Laura 1721 What is a living will? A living will is a legal form you use to write down the kind of care you want at the end of your life. It is used by the health professionals who will treat you if you aren't able to decide for yourself. If you put your wishes in writing, your loved ones and others will know what kind of care you want. They won't need to guess. This can ease your mind and be helpful to others. A living will is not the same as an estate or property will. An estate will explains what you want to happen with your money and property after you die. Is a living will a legal document? A living will is a legal document. Each state has its own laws about living melissa. If you move to another state, make sure that your living will is legal in the state where you now live. Or you might use a universal form that has been approved by many states. This kind of form can sometimes be completed and stored online. Your electronic copy will then be available wherever you have a connection to the Internet. In most cases, doctors will respect your wishes even if you have a form from a different state. · You don't need an  to complete a living will. But legal advice can be helpful if your state's laws are unclear, your health history is complicated, or your family can't agree on what should be in your living will. · You can change your living will at any time. Some people find that their wishes about end-of-life care change as their health changes. · In addition to making a living will, think about completing a medical power of  form. This form lets you name the person you want to make end-of-life treatment decisions for you (your \"health care agent\") if you're not able to. Many hospitals and nursing homes will give you the forms you need to complete a living will and a medical power of . · Your living will is used only if you can't make or communicate decisions for yourself anymore. If you become able to make decisions again, you can accept or refuse any treatment, no matter what you wrote in your living will. · Your state may offer an online registry. This is a place where you can store your living will online so the doctors and nurses who need to treat you can find it right away. What should you think about when creating a living will? Talk about your end-of-life wishes with your family members and your doctor. Let them know what you want. That way the people making decisions for you won't be surprised by your choices. Think about these questions as you make your living will: · Do you know enough about life support methods that might be used? If not, talk to your doctor so you know what might be done if you can't breathe on your own, your heart stops, or you're unable to swallow. · What things would you still want to be able to do after you receive life-support methods? Would you want to be able to walk? To speak? To eat on your own? To live without the help of machines? · If you have a choice, where do you want to be cared for? In your home? At a hospital or nursing home? · Do you want certain Oriental orthodox practices performed if you become very ill? · If you have a choice at the end of your life, where would you prefer to die? At home? In a hospital or nursing home? Somewhere else? · Would you prefer to be buried or cremated? · Do you want your organs to be donated after you die? What should you do with your living will? · Make sure that your family members and your health care agent have copies of your living will. · Give your doctor a copy of your living will to keep in your medical record. If you have more than one doctor, make sure that each one has a copy. · You may want to put a copy of your living will where it can be easily found. Where can you learn more? Go to http://julisa-davide.info/. Enter D814 in the search box to learn more about \"Learning About Living [place]. \" Current as of: August 8, 2016 Content Version: 11.3 © 4434-3797 [employer]. Care instructions adapted under license by [employer] (which disclaims liability or warranty for this information). If you have questions about a medical condition or this instruction, always ask your healthcare professional. [address] any warranty or liability for your use of this information. Preventing Falls: Care Instructions Your Care Instructions Getting around your home safely can be a challenge if you have injuries or health problems that make it easy for you to fall. Loose rugs and furniture in walkways are among the dangers for many older people who have problems walking or who have poor eyesight. People who have conditions such as arthritis, osteoporosis, or dementia also have to be careful not to fall. You can make your home safer with a few simple measures. Follow-up care is a key part of your treatment and safety. Be sure to make and go to all appointments, and call your doctor if you are having problems. It's also a good idea to know your test results and keep a list of the medicines you take. How can you care for yourself at home? Taking care of yourself · You may get dizzy if you do not drink enough water. To prevent dehydration, drink plenty of fluids, enough so that your urine is light yellow or clear like water. Choose water and other caffeine-free clear liquids. If you have kidney, heart, or liver disease and have to limit fluids, talk with your doctor before you increase the amount of fluids you drink. · Exercise regularly to improve your strength, muscle tone, and balance. Walk if you can. Swimming may be a good choice if you cannot walk easily. · Have your vision and hearing checked each year or any time you notice a change. If you have trouble seeing and hearing, you might not be able to avoid objects and could lose your balance. · Know the side effects of the medicines you take. Ask your doctor or pharmacist whether the medicines you take can affect your balance. Sleeping pills or sedatives can affect your balance. · Limit the amount of alcohol you drink. Alcohol can impair your balance and other senses. · Ask your doctor whether calluses or corns on your feet need to be removed. If you wear loose-fitting shoes because of calluses or corns, you can lose your balance and fall. · Talk to your doctor if you have numbness in your feet. Preventing falls at home · Remove raised doorway thresholds, throw rugs, and clutter. Repair loose carpet or raised areas in the floor. · Move furniture and electrical cords to keep them out of walking paths. · Use nonskid floor wax, and wipe up spills right away, especially on ceramic tile floors. · If you use a walker or cane, put rubber tips on it. If you use crutches, clean the bottoms of them regularly with an abrasive pad, such as steel wool. · Keep your house well lit, especially Sharon Bream, and outside walkways. Use night-lights in areas such as hallways and bathrooms. Add extra light switches or use remote switches (such as switches that go on or off when you clap your hands) to make it easier to turn lights on if you have to get up during the night. · Install sturdy handrails on stairways. · Move items in your cabinets so that the things you use a lot are on the lower shelves (about waist level). · Keep a cordless phone and a flashlight with new batteries by your bed. If possible, put a phone in each of the main rooms of your house, or carry a cell phone in case you fall and cannot reach a phone. Or, you can wear a device around your neck or wrist. You push a button that sends a signal for help. · Wear low-heeled shoes that fit well and give your feet good support. Use footwear with nonskid soles. Check the heels and soles of your shoes for wear. Repair or replace worn heels or soles. · Do not wear socks without shoes on wood floors. · Walk on the grass when the sidewalks are slippery. If you live in an area that gets snow and ice in the winter, sprinkle salt on slippery steps and sidewalks. Preventing falls in the bath · Install grab bars and nonskid mats inside and outside your shower or tub and near the toilet and sinks. · Use shower chairs and bath benches. · Use a hand-held shower head that will allow you to sit while showering. · Get into a tub or shower by putting the weaker leg in first. Get out of a tub or shower with your strong side first. 
· Repair loose toilet seats and consider installing a raised toilet seat to make getting on and off the toilet easier. · Keep your bathroom door unlocked while you are in the shower. Where can you learn more? Go to http://www.zapien.com/. Enter 0476 [id number] in the search box to learn more about \"Preventing Falls: Care Instructions. \" Current as of: March 16, 2018 Content Version: 11.8 © 6701-0061 [employer]. Care instructions adapted under license by [employer] (which disclaims liability or warranty for this information). If you have questions about a medical condition or this instruction, always ask your healthcare professional. [address] any warranty or liability for your use of this information.

## 2020-09-16 NOTE — ACP (ADVANCE CARE PLANNING)
Advance Care Planning (ACP) Physician       Date of ACP Conversation: 4/24/2020    Conversation Conducted with:   Patient with Decision Making Capacity     Other Legally Authorized Decision Maker would be Baljinder Witt child as SDM     For My patients who has currently great Decision Making Capacity:     Patient is on presence of no family member,, stated that the pt wants to be not DNR at this time,  The pt likes to be a full code individual,  The patient states that there is a lot of will to live,  Pt was given the form,   will sign and bring us a copy on the later date.       Conversation Outcomes / Follow-Up Plan:   Completed new Advance Directive      Length of ACP Conversation in minutes:  16 minutes

## 2020-09-16 NOTE — PROGRESS NOTES
This is the Subsequent Medicare Annual Wellness Exam, performed 12 months or more after the Initial AWV or the last Subsequent AWV    I have reviewed the patient's medical history in detail and updated the computerized patient record. History     Present for CPE, last Complete Physical exam was >1 yr ago , not Up todate w/ all vaccination all offered but refusing, last tetanus vaccine was in 2015  Last psa exam was normal and was in  >2 yrs ago,    last colonoscopy was normal and was 3yrs  Ago,   No past surgical hx,  last bone dexa scan was normal and was in 2015, pt has had hx of compression fx, currently on no caltrate bid      No family hx of breast cancer in a male  no family hx of prostate cancer   no family hx of colon cancer, father 65yo from bile duct cancer mother 69yo from heart Dz , not sexaully active and ++++ physically active,  compliant w/ meds, no Rf needed for today for his meds.       pt states that he had not been depressed no hx of fall nl interest to do things,     Patient with a history of cardiac bypass with decreased ability to maintain erection requesting to be prescribed testosterone tablets stating that he sees TV advertisement for pills that they do fix erectile dysfunction testosterone replacement therapy were discussed significantly with the patient patient unfortunately would not be a good candidate secondary to testosterone injection cardiovascular risk based on American endocrinologist Association, at this time patient was advised to do you him on [employer] and a very good multivitamins patient was given couple names and was told to call if the condition does not get improved and patient was also advised to avoid any over-the-counter testosterone replacement therapy.   Patient also worried about his elevation of the blood pressure stating that he has been dealing with  HTN, more so many years and the most recent reading has been 147/72, patient so far has been checked in the office setting with 2 blood pressure reading in 2014 which are nice and normal  Today pt present for Bp check and++ Compliancy w/ the bp meds, having had the low salt diet ,  has been active, patien does  obtain the bp at home , today the pt denies Chest Pain, has no legs swelling no lightheadedness,  Patient also has had history of decreased platelet level kidney function test last visit dropped to 57 with GFR able to urinate nicely his ibuprofen and NSAIDs were discontinued stating the constipation better with Colace no refills needed and reflux medication also helping he has not been taking it on a regular basis he takes it as needed patient A1c was into prediabetic state for which will be rechecked today patient denies any polyuria polydipsia at this time hemoglobin A1c 5.7         Patient Active Problem List   Diagnosis Code    Coronary atherosclerosis of artery bypass graft I25.810    CAD (coronary artery disease) I25.10    S/P CABG x 3 Z95.1    Chronic ischemic heart disease I25.9    Hypercholesterolemia E78.00    Urinary frequency R35.0    HTN, goal below 130/80 I10    Macrocytic anemia D53.9    Prediabetes R73.03    Hyperbilirubinemia E80.6    Elevated PSA R97.20    Back pain, chronic M54.9, G89.29    T12 compression fracture (HCC) S22.080A    Breast lump on left side at 11 o'clock position N63.22    ASHD (arteriosclerotic heart disease) I25.10    Acute bronchitis J20.9    Chronic bronchitis ([facility] J42    H/O asbestos exposure Z77.090    Cough due to ACE inhibitor R05, T46.4X5A    Cat scratch of right forearm with infection S50.811A, L08.9, W55.03XA    Swelling of right hand M79.89    Chronic idiopathic constipation K59.04    Gastroesophageal reflux disease without esophagitis K21.9    Ketonuria R82.4    Chest pain at rest R07.9    SINGH (dyspnea on exertion) R06.00    Anxiety and depression F41.9, F32.9    Coronary artery disease with unstable angina pectoris ([facility] [address] I25.110    CKD (chronic kidney disease) stage 3, GFR 30-59 ml/min ([facility]) N18.3    Acute renal failure ([facility]) N17.9     Past Medical History:   Diagnosis Date    Acute bronchitis 3/16/2016    Back pain, chronic 11/7/2014    Breast lump on left side at 11 o'clock position 5/7/2015    CAD (coronary artery disease)     Cat scratch of right forearm with infection 6/30/2016    Chronic bronchitis ([address] 4/5/2016    Chronic idiopathic constipation 4/17/2017    Gastroesophageal reflux disease without esophagitis 4/17/2017    H/O asbestos exposure 4/5/2016    H/O asbestos exposure 4/5/2016    Hypercholesterolemia     Hypertension     Ketonuria 4/17/2017    Need for diphtheria-tetanus-pertussis (Tdap) vaccine, adult/adolescent 4/4/2014    Need for pneumococcal vaccination 4/4/2014    Needs flu shot 4/4/2014    S/P CABG x 3     2001    Swelling of right hand 7/7/2016    T12 compression fracture ([facility] [address] 11/10/2014      Past Surgical History:   Procedure Laterality Date    CARDIAC SURG PROCEDURE UNLIST      CABG    CARDIAC SURG PROCEDURE UNLIST  2000    bypass    UPPER GI ENDOSCOPY,BIOPSY  10/10/2019         UPPER GI ENDOSCOPY,DILATN W GUIDE  10/10/2019          Current Outpatient Medications   Medication Sig Dispense Refill    isosorbide mononitrate ER (IMDUR) 30 mg tablet Take 1 Tab by mouth daily. 90 Tab 3    losartan-hydroCHLOROthiazide (HYZAAR) 100-25 mg per tablet TAKE 1 TABLET EVERY DAY FOR HYPERTENSION 90 Tab 3    tamsulosin (FLOMAX) 0.4 mg capsule Take 1 Cap by mouth daily. 30 Cap 0    omeprazole (PRILOSEC) 40 mg capsule Take 1 Cap by mouth daily. 90 Cap 2    rosuvastatin (CRESTOR) 20 mg tablet Take 1 Tab by mouth nightly. 90 Tab 2    calcium-cholecalciferol, D3, (CALTRATE 600+D) tablet Take 1 Tab by mouth daily. 60 Tab 11    clemastine 2.68 mg tab tablet Take  by mouth two (2) times a day.       nitroglycerin (NITROSTAT) 0.4 mg SL tablet 1 Tab by SubLINGual route every five (5) minutes as needed for Chest Pain. Up to 3 doses. 30 Tab 3    COLACE 100 mg capsule Take 100 mg by mouth daily as needed.  vitamins a & d cap       psyllium (METAMUCIL) powd One cup w/ 12 oz of water twice daily and hold for loose stool 660 g 6    ibuprofen (ADVIL) 200 mg tablet Take 3 Tabs by mouth every eight (8) hours as needed for Pain. 40 Tab 0    vitamin e (E GEMS) 1,000 unit capsule Take 1,000 Units by mouth daily.  aspirin 81 mg tablet Take 81 mg by mouth daily.  MULTIVITAMIN (MULTIPLE VITAMIN PO) Take 1 Tab by mouth daily. Allergies   Allergen Reactions    Ace Inhibitors Cough       Family History   Problem Relation Age of Onset    Heart Disease Mother     Cancer Father      Social History     Tobacco Use    Smoking status: Never Smoker    Smokeless tobacco: Never Used   Substance Use Topics    Alcohol use: Yes     Alcohol/week: 0.0 standard drinks     Comment: occasionally       Depression Risk Factor Screening:     3 most recent PHQ Screens 6/17/2019   Little interest or pleasure in doing things Not at all   Feeling down, depressed, irritable, or hopeless Not at all   Total Score PHQ 2 0       Alcohol Risk Screen   Do you average more than 1 drink per night or more than 7 drinks a week: No    In the past three months have you have had more than 4 drinks containing alcohol on one occasion: No        Functional Ability and Level of Safety:   Hearing: Hearing is good. Activities of Daily Living: The home contains: handrails, grab bars and rugs  Patient does total self care     Ambulation: with no difficulty     Fall Risk:  Fall Risk Assessment, last 12 mths 2/25/2020   Able to walk? Yes   Fall in past 12 months?  No     Abuse Screen:  Patient is not abused       Cognitive Screening   Has your family/caregiver stated any concerns about your memory: no    Cognitive Screening: Normal - MMSE (Mini Mental Status Exam)    Patient Care Team   Patient Care Team:  Willie Berumen MD as PCP - General (Family Medicine)  Luis Fernando Browning MD as PCP - [facility]  Peggy Connell MD as Physician (Cardiology)    Assessment/Plan   Education and counseling provided:  Are appropriate based on today's review and evaluation  End-of-Life planning (with patient's consent)  Medical nutrition therapy for individuals with diabetes or renal disease    Diagnoses and all orders for this visit:    1. Medicare annual wellness visit, subsequent    2. Platelets decreased (HCC)  -     isosorbide mononitrate ER (IMDUR) 30 mg tablet; Take 1 Tab by mouth daily. -     rosuvastatin (CRESTOR) 20 mg tablet; Take 1 Tab by mouth nightly. -     losartan-hydroCHLOROthiazide (HYZAAR) 100-25 mg per tablet; One tab daily  -     CBC W/O DIFF  -     LIPID PANEL  -     HEMOGLOBIN A1C WITH EAG  -     TSH 3RD GENERATION  -     METABOLIC PANEL, COMPREHENSIVE    3. Acute renal failure, unspecified acute renal failure type (HCC)  -     isosorbide mononitrate ER (IMDUR) 30 mg tablet; Take 1 Tab by mouth daily. -     rosuvastatin (CRESTOR) 20 mg tablet; Take 1 Tab by mouth nightly. -     losartan-hydroCHLOROthiazide (HYZAAR) 100-25 mg per tablet; One tab daily  -     CBC W/O DIFF  -     LIPID PANEL  -     HEMOGLOBIN A1C WITH EAG  -     TSH 3RD GENERATION  -     METABOLIC PANEL, COMPREHENSIVE    4. B12 deficiency  -     isosorbide mononitrate ER (IMDUR) 30 mg tablet; Take 1 Tab by mouth daily. -     rosuvastatin (CRESTOR) 20 mg tablet; Take 1 Tab by mouth nightly. -     losartan-hydroCHLOROthiazide (HYZAAR) 100-25 mg per tablet; One tab daily  -     CBC W/O DIFF  -     LIPID PANEL  -     HEMOGLOBIN A1C WITH EAG  -     TSH 3RD GENERATION  -     METABOLIC PANEL, COMPREHENSIVE    5. Coronary artery disease involving native heart with unstable angina pectoris, unspecified vessel or lesion type (HCC)  -     isosorbide mononitrate ER (IMDUR) 30 mg tablet; Take 1 Tab by mouth daily. -     rosuvastatin (CRESTOR) 20 mg tablet;  Take 1 Tab by mouth nightly. -     losartan-hydroCHLOROthiazide (HYZAAR) 100-25 mg per tablet; One tab daily  -     CBC W/O DIFF  -     LIPID PANEL  -     HEMOGLOBIN A1C WITH EAG  -     TSH 3RD GENERATION  -     METABOLIC PANEL, COMPREHENSIVE    6. CKD (chronic kidney disease) stage 3, GFR 30-59 ml/min ([facility])  -     isosorbide mononitrate ER (IMDUR) 30 mg tablet; Take 1 Tab by mouth daily. -     rosuvastatin (CRESTOR) 20 mg tablet; Take 1 Tab by mouth nightly. -     losartan-hydroCHLOROthiazide (HYZAAR) 100-25 mg per tablet; One tab daily  -     CBC W/O DIFF  -     LIPID PANEL  -     HEMOGLOBIN A1C WITH EAG  -     TSH 3RD GENERATION  -     METABOLIC PANEL, COMPREHENSIVE    7. Advanced directives, counseling/discussion  -     ADVANCE CARE PLANNING FIRST 30 MINS  -     FULL CODE    8. Screening for depression  -     DEPRESSION SCREEN ANNUAL    9. Encounter for immunization  -     ADMIN INFLUENZA VIRUS VAC  -     INFLUENZA VIRUS VACCINE, HIGH DOSE SEASONAL, PRESERVATIVE FREE    10. Chronic idiopathic constipation  -     CBC W/O DIFF  -     LIPID PANEL  -     HEMOGLOBIN A1C WITH EAG  -     TSH 3RD GENERATION  -     METABOLIC PANEL, COMPREHENSIVE    11. Gastroesophageal reflux disease without esophagitis  -     CBC W/O DIFF  -     LIPID PANEL  -     HEMOGLOBIN A1C WITH EAG  -     TSH 3RD GENERATION  -     METABOLIC PANEL, COMPREHENSIVE    12. Other male erectile dysfunction         AWV education and counseling provided:    Age appropriate evidence-based preventive care recommendations based on today's review and evaluation; including relevant cancer screening guidelines, and vaccination recommendations. Primary secondary and tertiary preventive measures were discussed with the patient with information about these guidelines, and a personalized schedule for health maintenance items. When appropriate and with patient agreement, orders noted were placed to complete missing health maintenance items.             Health Maintenance Due   Topic Date Due    Shingrix Vaccine Age 49> (1 of 2) 03/15/1989    Pneumococcal 65+ years (1 of 1 - PPSV23) 03/15/2004    GLAUCOMA SCREENING Q2Y  02/28/2016    Medicare Yearly Exam  06/17/2020    Flu Vaccine (1) 09/01/2020       Roxane Godfrey Sr, who was evaluated through a synchronous (real-time) audio-video encounter, and/or his healthcare decision maker, is aware that it is a billable service, with coverage as determined by his insurance carrier. He provided verbal consent to proceed: Yes, and patient identification was verified. It was conducted pursuant to the emergency declaration under the [address] [place] [address], [address] [place] [address] authority and the [employer]. A caregiver was present when appropriate. Ability to conduct physical exam was limited. I was at home. The patient was at home.     Chacorta Vee MD

## 2020-09-24 ENCOUNTER — APPOINTMENT (OUTPATIENT)
Dept: FAMILY MEDICINE CLINIC | Age: 80
End: 2020-09-24

## 2020-09-24 PROCEDURE — 36415 COLL VENOUS BLD VENIPUNCTURE: CPT | Performed by: FAMILY MEDICINE

## 2020-09-25 LAB
ALBUMIN SERPL-MCNC: 4.5 G/DL (ref 3.6–4.6)
ALBUMIN/GLOB SERPL: 2.3 {RATIO} (ref 1.2–2.2)
ALP SERPL-CCNC: 49 IU/L (ref 39–117)
ALT SERPL-CCNC: 16 IU/L (ref 0–44)
AST SERPL-CCNC: 24 IU/L (ref 0–40)
BILIRUB SERPL-MCNC: 1.4 MG/DL (ref 0–1.2)
BUN SERPL-MCNC: 21 MG/DL (ref 8–27)
BUN/CREAT SERPL: 18 (ref 10–24)
CALCIUM SERPL-MCNC: 9.5 MG/DL (ref 8.6–10.2)
CHLORIDE SERPL-SCNC: 102 MMOL/L (ref 96–106)
CHOLEST SERPL-MCNC: 130 MG/DL (ref 100–199)
CO2 SERPL-SCNC: 24 MMOL/L (ref 20–29)
CREAT SERPL-MCNC: 1.2 MG/DL (ref 0.76–1.27)
ERYTHROCYTE [DISTWIDTH] IN BLOOD BY AUTOMATED COUNT: 12.5 % (ref 11.6–15.4)
EST. AVERAGE GLUCOSE BLD GHB EST-MCNC: 123 MG/DL
GLOBULIN SER CALC-MCNC: 2 G/DL (ref 1.5–4.5)
GLUCOSE SERPL-MCNC: 97 MG/DL (ref 65–99)
HBA1C MFR BLD: 5.9 % (ref 4.8–5.6)
HCT VFR BLD AUTO: 40.6 % (ref 37.5–51)
HDLC SERPL-MCNC: 61 MG/DL
HGB BLD-MCNC: 13.6 G/DL (ref 13–17.7)
INTERPRETATION: NORMAL
LDLC SERPL CALC-MCNC: 56 MG/DL (ref 0–99)
MCH RBC QN AUTO: 32.9 PG (ref 26.6–33)
MCHC RBC AUTO-ENTMCNC: 33.5 G/DL (ref 31.5–35.7)
MCV RBC AUTO: 98 FL (ref 79–97)
PLATELET # BLD AUTO: 132 X10E3/UL (ref 150–450)
POTASSIUM SERPL-SCNC: 4.2 MMOL/L (ref 3.5–5.2)
PROT SERPL-MCNC: 6.5 G/DL (ref 6–8.5)
RBC # BLD AUTO: 4.14 X10E6/UL (ref 4.14–5.8)
SODIUM SERPL-SCNC: 142 MMOL/L (ref 134–144)
TRIGL SERPL-MCNC: 60 MG/DL (ref 0–149)
TSH SERPL DL<=0.005 MIU/L-ACNC: 0.86 UIU/ML (ref 0.45–4.5)
VLDLC SERPL CALC-MCNC: 13 MG/DL (ref 5–40)
WBC # BLD AUTO: 6 X10E3/UL (ref 3.4–10.8)

## 2020-12-01 DIAGNOSIS — N18.30 CKD (CHRONIC KIDNEY DISEASE) STAGE 3, GFR 30-59 ML/MIN (HCC): ICD-10-CM

## 2020-12-01 DIAGNOSIS — E53.8 B12 DEFICIENCY: ICD-10-CM

## 2020-12-01 DIAGNOSIS — N17.9 ACUTE RENAL FAILURE, UNSPECIFIED ACUTE RENAL FAILURE TYPE (HCC): ICD-10-CM

## 2020-12-01 DIAGNOSIS — I25.110 CORONARY ARTERY DISEASE INVOLVING NATIVE HEART WITH UNSTABLE ANGINA PECTORIS, UNSPECIFIED VESSEL OR LESION TYPE (HCC): ICD-10-CM

## 2020-12-01 DIAGNOSIS — D69.6 PLATELETS DECREASED (HCC): ICD-10-CM

## 2020-12-01 DIAGNOSIS — K21.9 CHRONIC GERD: ICD-10-CM

## 2020-12-01 RX ORDER — ROSUVASTATIN CALCIUM 20 MG/1
TABLET, COATED ORAL
Qty: 90 TAB | Refills: 2 | Status: SHIPPED | OUTPATIENT
Start: 2020-12-01 | End: 2021-09-07

## 2020-12-01 RX ORDER — OMEPRAZOLE 40 MG/1
CAPSULE, DELAYED RELEASE ORAL
Qty: 90 CAP | Refills: 2 | Status: SHIPPED | OUTPATIENT
Start: 2020-12-01

## 2021-05-21 DIAGNOSIS — E53.8 B12 DEFICIENCY: ICD-10-CM

## 2021-05-21 DIAGNOSIS — N17.9 ACUTE RENAL FAILURE, UNSPECIFIED ACUTE RENAL FAILURE TYPE (HCC): ICD-10-CM

## 2021-05-21 DIAGNOSIS — D69.6 PLATELETS DECREASED (HCC): ICD-10-CM

## 2021-05-21 DIAGNOSIS — I25.110 CORONARY ARTERY DISEASE INVOLVING NATIVE HEART WITH UNSTABLE ANGINA PECTORIS, UNSPECIFIED VESSEL OR LESION TYPE (HCC): ICD-10-CM

## 2021-05-21 DIAGNOSIS — N18.30 CKD (CHRONIC KIDNEY DISEASE) STAGE 3, GFR 30-59 ML/MIN (HCC): ICD-10-CM

## 2021-05-24 RX ORDER — LOSARTAN POTASSIUM AND HYDROCHLOROTHIAZIDE 25; 100 MG/1; MG/1
TABLET ORAL
Qty: 90 TABLET | Refills: 3 | Status: SHIPPED | OUTPATIENT
Start: 2021-05-24 | End: 2022-05-12

## 2021-08-03 PROBLEM — I25.10 CAD (CORONARY ARTERY DISEASE): Status: RESOLVED | Noted: 2021-08-03 | Resolved: 2021-08-03

## 2021-08-31 ENCOUNTER — OFFICE VISIT (OUTPATIENT)
Dept: FAMILY MEDICINE CLINIC | Age: 81
End: 2021-08-31
Payer: MEDICARE

## 2021-08-31 VITALS
HEART RATE: 71 BPM | HEIGHT: 67 IN | SYSTOLIC BLOOD PRESSURE: 109 MMHG | BODY MASS INDEX: 26.89 KG/M2 | RESPIRATION RATE: 18 BRPM | WEIGHT: 171.3 LBS | DIASTOLIC BLOOD PRESSURE: 57 MMHG | OXYGEN SATURATION: 97 %

## 2021-08-31 DIAGNOSIS — G63 VITAMIN B12 DEFICIENCY NEUROPATHY (HCC): ICD-10-CM

## 2021-08-31 DIAGNOSIS — D69.6 THROMBOCYTOPENIA, UNSPECIFIED (HCC): ICD-10-CM

## 2021-08-31 DIAGNOSIS — R73.03 PREDIABETES: ICD-10-CM

## 2021-08-31 DIAGNOSIS — Z00.00 MEDICARE ANNUAL WELLNESS VISIT, SUBSEQUENT: Primary | ICD-10-CM

## 2021-08-31 DIAGNOSIS — E53.8 VITAMIN B12 DEFICIENCY NEUROPATHY (HCC): ICD-10-CM

## 2021-08-31 DIAGNOSIS — Z71.89 ADVANCED DIRECTIVES, COUNSELING/DISCUSSION: ICD-10-CM

## 2021-08-31 DIAGNOSIS — Z00.00 LABORATORY EXAM ORDERED AS PART OF ROUTINE GENERAL MEDICAL EXAMINATION: ICD-10-CM

## 2021-08-31 DIAGNOSIS — E78.00 HYPERCHOLESTEROLEMIA: ICD-10-CM

## 2021-08-31 DIAGNOSIS — J41.8 MIXED SIMPLE AND MUCOPURULENT CHRONIC BRONCHITIS (HCC): ICD-10-CM

## 2021-08-31 PROCEDURE — 1101F PT FALLS ASSESS-DOCD LE1/YR: CPT | Performed by: FAMILY MEDICINE

## 2021-08-31 PROCEDURE — G8752 SYS BP LESS 140: HCPCS | Performed by: FAMILY MEDICINE

## 2021-08-31 PROCEDURE — G8419 CALC BMI OUT NRM PARAM NOF/U: HCPCS | Performed by: FAMILY MEDICINE

## 2021-08-31 PROCEDURE — G8536 NO DOC ELDER MAL SCRN: HCPCS | Performed by: FAMILY MEDICINE

## 2021-08-31 PROCEDURE — G8754 DIAS BP LESS 90: HCPCS | Performed by: FAMILY MEDICINE

## 2021-08-31 PROCEDURE — G8427 DOCREV CUR MEDS BY ELIG CLIN: HCPCS | Performed by: FAMILY MEDICINE

## 2021-08-31 PROCEDURE — G0439 PPPS, SUBSEQ VISIT: HCPCS | Performed by: FAMILY MEDICINE

## 2021-08-31 PROCEDURE — G9717 DOC PT DX DEP/BP F/U NT REQ: HCPCS | Performed by: FAMILY MEDICINE

## 2021-08-31 PROCEDURE — 99497 ADVNCD CARE PLAN 30 MIN: CPT | Performed by: FAMILY MEDICINE

## 2021-08-31 NOTE — PROGRESS NOTES
This is the Subsequent Medicare Annual Wellness Exam, performed 12 months or more after the Initial AWV or the last Subsequent AWV    I have reviewed the patient's medical history in detail and updated the computerized patient record. Patient able to drive no recent accident, Patient compare self health the same to others with the same age,   patient with normal hearing normal vision able to do all ADL currently working full-time, having had no fall and no incontinence having normal appetite no weight loss since last seen eating fruits and vegetables every day does not do exercises denies any substance abuse,          Assessment/Plan   Education and counseling provided:  Are appropriate based on today's review and evaluation  End-of-Life planning (with patient's consent)  Diabetes outpatient self-management training services    1. Medicare annual wellness visit, subsequent  2. Advanced directives, counseling/discussion  -     FULL CODE  -     ADVANCE CARE PLANNING FIRST 30 MINS  3. Laboratory exam ordered as part of routine general medical examination  -     CBC W/O DIFF; Future  -     LIPID PANEL; Future  -     METABOLIC PANEL, COMPREHENSIVE; Future  -     URINALYSIS W/ RFLX MICROSCOPIC; Future  -     TSH 3RD GENERATION; Future  -     HEMOGLOBIN A1C WITH EAG; Future  4. Prediabetes  -     CBC W/O DIFF; Future  -     LIPID PANEL; Future  -     METABOLIC PANEL, COMPREHENSIVE; Future  -     URINALYSIS W/ RFLX MICROSCOPIC; Future  -     TSH 3RD GENERATION; Future  -     HEMOGLOBIN A1C WITH EAG; Future  5. Hypercholesterolemia  -     CBC W/O DIFF; Future  -     LIPID PANEL; Future  -     METABOLIC PANEL, COMPREHENSIVE; Future  -     URINALYSIS W/ RFLX MICROSCOPIC; Future  -     TSH 3RD GENERATION; Future  -     HEMOGLOBIN A1C WITH EAG; Future  6. Vitamin B12 deficiency neuropathy ([address]  7. Mixed simple and mucopurulent chronic bronchitis ([address]  8.  Thrombocytopenia, unspecified ([address]       Depression Risk Factor Screening     3 most recent PHQ Screens 8/31/2021   Little interest or pleasure in doing things Not at all   Feeling down, depressed, irritable, or hopeless Not at all   Total Score PHQ 2 0       Alcohol Risk Screen    Do you average more than 1 drink per night or more than 7 drinks a week: No    In the past three months have you have had more than 4 drinks containing alcohol on one occasion: No        Functional Ability and Level of Safety    Hearing: Hearing is good. Activities of Daily Living: The home contains: handrails, grab bars and rugs  Patient does total self care      Ambulation: with no difficulty     Fall Risk:  Fall Risk Assessment, last 12 mths 8/31/2021   Able to walk? Yes   Fall in past 12 months? 0   Do you feel unsteady?  0   Are you worried about falling 0      Abuse Screen:  Patient is not abused       Cognitive Screening    Has your family/caregiver stated any concerns about your memory: no     Cognitive Screening: Normal - MMSE (Mini Mental Status Exam)    Health Maintenance Due     Health Maintenance Due   Topic Date Due    Shingrix Vaccine Age 49> (1 of 2) Never done    Pneumococcal 65+ years (1 of 1 - PPSV23) Never done       Patient Care Team   Patient Care Team:  Rohit Tovar MD as PCP - General (Family Medicine)  Rohit Tovar MD as PCP - [facility] Empaneled Provider  Joann Talavera MD as Physician (Cardiology)    History     Patient Active Problem List   Diagnosis Code    Coronary atherosclerosis of artery bypass graft I25.810    S/P CABG x 3 Z95.1    Chronic ischemic heart disease I25.9    Hypercholesterolemia E78.00    Urinary frequency R35.0    HTN, goal below 130/80 I10    Macrocytic anemia D53.9    Prediabetes R73.03    Hyperbilirubinemia E80.6    Elevated PSA R97.20    Back pain, chronic M54.9, G89.29    T12 compression fracture (HCC) S22.080A    Breast lump on left side at 11 o'clock position N63.22    ASHD (arteriosclerotic heart disease) I25.10    Acute bronchitis J20.9    Chronic bronchitis ([address] J42    H/O asbestos exposure Z77.090    Cough due to ACE inhibitor R05, T46.4X5A    Cat scratch of right forearm with infection S50.811A, L08.9, W55.03XA    Swelling of right hand M79.89    Chronic idiopathic constipation K59.04    Gastroesophageal reflux disease without esophagitis K21.9    Ketonuria R82.4    Chest pain at rest R07.9    SINGH (dyspnea on exertion) R06.00    Anxiety and depression F41.9, F32.9    Coronary artery disease with unstable angina pectoris (HCC) I25.110    CKD (chronic kidney disease) stage 3, GFR 30-59 ml/min ([facility]) N18.30    Acute renal failure (HCC) N17.9     Past Medical History:   Diagnosis Date    Acute bronchitis 3/16/2016    Back pain, chronic 11/7/2014    Breast lump on left side at 11 o'clock position 5/7/2015    CAD (coronary artery disease)     Cat scratch of right forearm with infection 6/30/2016    Chronic bronchitis ([facility] 4/5/2016    Chronic idiopathic constipation 4/17/2017    Gastroesophageal reflux disease without esophagitis 4/17/2017    H/O asbestos exposure 4/5/2016    H/O asbestos exposure 4/5/2016    Hypercholesterolemia     Hypertension     Ketonuria 4/17/2017    Need for diphtheria-tetanus-pertussis (Tdap) vaccine, adult/adolescent 4/4/2014    Need for pneumococcal vaccination 4/4/2014    Needs flu shot 4/4/2014    S/P CABG x 3     2001    Swelling of right hand 7/7/2016    T12 compression fracture ([facility] [address] 11/10/2014      Past Surgical History:   Procedure Laterality Date    CARDIAC SURG PROCEDURE UNLIST      CABG    CARDIAC SURG PROCEDURE UNLIST  2000    bypass    UPPER GI ENDOSCOPY,BIOPSY  10/10/2019         UPPER GI ENDOSCOPY,DILATN W GUIDE  10/10/2019          Current Outpatient Medications   Medication Sig Dispense Refill    losartan-hydroCHLOROthiazide (HYZAAR) 100-25 mg per tablet TAKE 1 TABLET EVERY DAY FOR HYPERTENSION 90 Tablet 3    omeprazole (PRILOSEC) 40 mg capsule TAKE 1 CAPSULE EVERY DAY (SUBSTITUTED FOR PRILOSEC) 90 Cap 2    rosuvastatin (CRESTOR) 20 mg tablet TAKE 1 TABLET EVERY NIGHT 90 Tab 2    isosorbide mononitrate ER (IMDUR) 30 mg tablet Take 1 Tab by mouth daily. 90 Tab 3    calcium-cholecalciferol, D3, (CALTRATE 600+D) tablet Take 1 Tab by mouth daily. 60 Tab 11    nitroglycerin (NITROSTAT) 0.4 mg SL tablet 1 Tab by SubLINGual route every five (5) minutes as needed for Chest Pain. Up to 3 doses. 30 Tab 3    aspirin 81 mg tablet Take 81 mg by mouth daily.  clemastine 2.68 mg tab tablet Take  by mouth two (2) times a day. (Patient not taking: Reported on 8/31/2021)      COLACE 100 mg capsule Take 100 mg by mouth daily as needed. (Patient not taking: Reported on 8/31/2021)      vitamins a & d cap  (Patient not taking: Reported on 8/31/2021)      MULTIVITAMIN (MULTIPLE VITAMIN PO) Take 1 Tab by mouth daily. (Patient not taking: Reported on 8/31/2021)       Allergies   Allergen Reactions    Ace Inhibitors Cough       Family History   Problem Relation Age of Onset    Heart Disease Mother     Cancer Father      Social History     Tobacco Use    Smoking status: Never Smoker    Smokeless tobacco: Never Used   Substance Use Topics    Alcohol use:  Yes     Alcohol/week: 0.0 standard drinks     Comment: occasionally         Jillian Fenton MD

## 2021-08-31 NOTE — ACP (ADVANCE CARE PLANNING)
Advance Care Planning           Date of Conversation: 8/31/2021          Advance Care Planning (ACP) Physician       Conversation Conducted with:   Patient with Decision Making Capacity     Other Legally Authorized Decision Maker would be Crow Benavides as SDM     For My patients who has currently great Decision Making Capacity:     Patient is on presence of no family member,, stated that the pt wants to be not DNR at this time,  The pt likes to be a full code individual,  The patient states that there is a lot of will to live,  Pt was given the form,   will sign and bring us a copy on the later date.       Conversation Outcomes / Follow-Up Plan:   Completed new Advance Directive      Length of ACP Conversation in minutes:  16 minutes          Jackelyn Cerda MD

## 2021-08-31 NOTE — PROGRESS NOTES
Chief Complaint   Patient presents with   t Annual Wellness Visit     1. Have you been to the ER, urgent care clinic since your last visit? Hospitalized since your last visit? Yes When: 05/2021 [facility]  Reason for visit: eye injury    2. Have you seen or consulted any other health care providers outside of the [address] [place] [address] since your last visit? Include any pap smears or colon screening. NO    Verified patient with two type of identifiers. verified patient with two type of identifiers. Denies c/o at present.

## 2021-08-31 NOTE — PATIENT INSTRUCTIONS
Medicare Wellness Visit, Male    The best way to live healthy is to have a lifestyle where you eat a well-balanced diet, exercise regularly, limit alcohol use, and quit all forms of tobacco/nicotine, if applicable. Regular preventive services are another way to keep healthy. Preventive services (vaccines, screening tests, monitoring & exams) can help personalize your care plan, which helps you manage your own care. Screening tests can find health problems at the earliest stages, when they are easiest to treat. [facility] follows the current, evidence-based guidelines published by the [facility] Javier Vincenzo ([facility]) when recommending preventive services for our patients. Because we follow these guidelines, sometimes recommendations change over time as research supports it. (For example, a prostate screening blood test is no longer routinely recommended for men with no symptoms). Of course, you and your doctor may decide to screen more often for some diseases, based on your risk and co-morbidities (chronic disease you are already diagnosed with). Preventive services for you include:  - Medicare offers their members a free annual wellness visit, which is time for you and your primary care provider to discuss and plan for your preventive service needs. Take advantage of this benefit every year!  -All adults over age 72 should receive the recommended pneumonia vaccines. Current USPSTF guidelines recommend a series of two vaccines for the best pneumonia protection.   -All adults should have a flu vaccine yearly and tetanus vaccine every 10 years.  -All adults age 48 and older should receive the shingles vaccines (series of two vaccines).        -All adults age 38-68 who are overweight should have a diabetes screening test once every three years.   -Other screening tests & preventive services for persons with diabetes include: an eye exam to screen for diabetic retinopathy, a kidney function test, a foot exam, and stricter control over your cholesterol.   -Cardiovascular screening for adults with routine risk involves an electrocardiogram (ECG) at intervals determined by the provider.   -Colorectal cancer screening should be done for adults age 54-65 with no increased risk factors for colorectal cancer. There are a number of acceptable methods of screening for this type of cancer. Each test has its own benefits and drawbacks. Discuss with your provider what is most appropriate for you during your annual wellness visit. The different tests include: colonoscopy (considered the best screening method), a fecal occult blood test, a fecal DNA test, and sigmoidoscopy.  -All adults born between [facility] should be screened once for Hepatitis C.  -An Abdominal Aortic Aneurysm (AAA) Screening is recommended for men age 73-68 who has ever smoked in their lifetime. Here is a list of your current Health Maintenance items (your personalized list of preventive services) with a due date:  Health Maintenance Due   Topic Date Due    Shingles Vaccine (1 of 2) Never done    Pneumococcal Vaccine (1 of 1 - PPSV23) Never done          Learning About Living Loren Marinelli  What is a living will? A living will is a legal form you use to write down the kind of care you want at the end of your life. It is used by the health professionals who will treat you if you aren't able to decide for yourself. If you put your wishes in writing, your loved ones and others will know what kind of care you want. They won't need to guess. This can ease your mind and be helpful to others. A living will is not the same as an estate or property will. An estate will explains what you want to happen with your money and property after you die. Is a living will a legal document? A living will is a legal document. Each state has its own laws about living melissa.  If you move to another state, make sure that your living will is legal in the state where you now live. Or you might use a universal form that has been approved by many states. This kind of form can sometimes be completed and stored online. Your electronic copy will then be available wherever you have a connection to the Internet. In most cases, doctors will respect your wishes even if you have a form from a different state. · You don't need an  to complete a living will. But legal advice can be helpful if your state's laws are unclear, your health history is complicated, or your family can't agree on what should be in your living will. · You can change your living will at any time. Some people find that their wishes about end-of-life care change as their health changes. · In addition to making a living will, think about completing a medical power of  form. This form lets you name the person you want to make end-of-life treatment decisions for you (your \"health care agent\") if you're not able to. Many hospitals and nursing homes will give you the forms you need to complete a living will and a medical power of . · Your living will is used only if you can't make or communicate decisions for yourself anymore. If you become able to make decisions again, you can accept or refuse any treatment, no matter what you wrote in your living will. · Your state may offer an online registry. This is a place where you can store your living will online so the doctors and nurses who need to treat you can find it right away. What should you think about when creating a living will? Talk about your end-of-life wishes with your family members and your doctor. Let them know what you want. That way the people making decisions for you won't be surprised by your choices. Think about these questions as you make your living will:  · Do you know enough about life support methods that might be used?  If not, talk to your doctor so you know what might be done if you can't breathe on your own, your heart stops, or you're unable to swallow. · What things would you still want to be able to do after you receive life-support methods? Would you want to be able to walk? To speak? To eat on your own? To live without the help of machines? · If you have a choice, where do you want to be cared for? In your home? At a hospital or nursing home? · Do you want certain Scientologist practices performed if you become very ill? · If you have a choice at the end of your life, where would you prefer to die? At home? In a hospital or nursing home? Somewhere else? · Would you prefer to be buried or cremated? · Do you want your organs to be donated after you die? What should you do with your living will? · Make sure that your family members and your health care agent have copies of your living will. · Give your doctor a copy of your living will to keep in your medical record. If you have more than one doctor, make sure that each one has a copy. · You may want to put a copy of your living will where it can be easily found. Where can you learn more? Go to http://www.gray.com/. Enter P039 in the search box to learn more about \"Learning About Living [facility]. \"  Current as of: August 8, 2016  Content Version: 11.3  © 6392-6953 [employer]. Care instructions adapted under license by [employer] (which disclaims liability or warranty for this information). If you have questions about a medical condition or this instruction, always ask your healthcare professional. Maryanne Spore any warranty or liability for your use of this information. Preventing Falls: Care Instructions  Your Care Instructions    Getting around your home safely can be a challenge if you have injuries or health problems that make it easy for you to fall.  Loose rugs and furniture in walkways are among the dangers for many older people who have problems walking or who have poor eyesight. People who have conditions such as arthritis, osteoporosis, or dementia also have to be careful not to fall. You can make your home safer with a few simple measures. Follow-up care is a key part of your treatment and safety. Be sure to make and go to all appointments, and call your doctor if you are having problems. It's also a good idea to know your test results and keep a list of the medicines you take. How can you care for yourself at home? Taking care of yourself  · You may get dizzy if you do not drink enough water. To prevent dehydration, drink plenty of fluids, enough so that your urine is light yellow or clear like water. Choose water and other caffeine-free clear liquids. If you have kidney, heart, or liver disease and have to limit fluids, talk with your doctor before you increase the amount of fluids you drink. · Exercise regularly to improve your strength, muscle tone, and balance. Walk if you can. Swimming may be a good choice if you cannot walk easily. · Have your vision and hearing checked each year or any time you notice a change. If you have trouble seeing and hearing, you might not be able to avoid objects and could lose your balance. · Know the side effects of the medicines you take. Ask your doctor or pharmacist whether the medicines you take can affect your balance. Sleeping pills or sedatives can affect your balance. · Limit the amount of alcohol you drink. Alcohol can impair your balance and other senses. · Ask your doctor whether calluses or corns on your feet need to be removed. If you wear loose-fitting shoes because of calluses or corns, you can lose your balance and fall. · Talk to your doctor if you have numbness in your feet. Preventing falls at home  · Remove raised doorway thresholds, throw rugs, and clutter. Repair loose carpet or raised areas in the floor. · Move furniture and electrical cords to keep them out of walking paths.   · Use nonskid floor wax, and wipe up spills right away, especially on ceramic tile floors. · If you use a walker or cane, put rubber tips on it. If you use crutches, clean the bottoms of them regularly with an abrasive pad, such as steel wool. · Keep your house well lit, especially Marjo Bennetts, and outside walkways. Use night-lights in areas such as hallways and bathrooms. Add extra light switches or use remote switches (such as switches that go on or off when you clap your hands) to make it easier to turn lights on if you have to get up during the night. · Install sturdy handrails on stairways. · Move items in your cabinets so that the things you use a lot are on the lower shelves (about waist level). · Keep a cordless phone and a flashlight with new batteries by your bed. If possible, put a phone in each of the main rooms of your house, or carry a cell phone in case you fall and cannot reach a phone. Or, you can wear a device around your neck or wrist. You push a button that sends a signal for help. · Wear low-heeled shoes that fit well and give your feet good support. Use footwear with nonskid soles. Check the heels and soles of your shoes for wear. Repair or replace worn heels or soles. · Do not wear socks without shoes on wood floors. · Walk on the grass when the sidewalks are slippery. If you live in an area that gets snow and ice in the winter, sprinkle salt on slippery steps and sidewalks. Preventing falls in the bath  · Install grab bars and nonskid mats inside and outside your shower or tub and near the toilet and sinks. · Use shower chairs and bath benches. · Use a hand-held shower head that will allow you to sit while showering. · Get into a tub or shower by putting the weaker leg in first. Get out of a tub or shower with your strong side first.  · Repair loose toilet seats and consider installing a raised toilet seat to make getting on and off the toilet easier.   · Keep your bathroom door unlocked while you are in the shower. Where can you learn more? Go to http://www.[employer].com/. Enter 0476 [id number] in the search box to learn more about \"Preventing Falls: Care Instructions. \"  Current as of: March 16, 2018  Content Version: 11.8  © 3962-5371 Healthwise, [employer]. Care instructions adapted under license by [employer] (which disclaims liability or warranty for this information). If you have questions about a medical condition or this instruction, always ask your healthcare professional. [address] any warranty or liability for your use of this information.

## 2021-09-01 LAB
ALBUMIN SERPL-MCNC: 3.9 G/DL (ref 3.5–5)
ALBUMIN/GLOB SERPL: 1.4 {RATIO} (ref 1.1–2.2)
ALP SERPL-CCNC: 44 U/L (ref 45–117)
ALT SERPL-CCNC: 24 U/L (ref 12–78)
ANION GAP SERPL CALC-SCNC: 5 MMOL/L (ref 5–15)
APPEARANCE UR: CLEAR
AST SERPL-CCNC: 27 U/L (ref 15–37)
BILIRUB SERPL-MCNC: 1.5 MG/DL (ref 0.2–1)
BILIRUB UR QL: NEGATIVE
BUN SERPL-MCNC: 21 MG/DL (ref 6–20)
BUN/CREAT SERPL: 16 (ref 12–20)
CALCIUM SERPL-MCNC: 9.3 MG/DL (ref 8.5–10.1)
CHLORIDE SERPL-SCNC: 109 MMOL/L (ref 97–108)
CHOLEST SERPL-MCNC: 134 MG/DL
CO2 SERPL-SCNC: 30 MMOL/L (ref 21–32)
COLOR UR: NORMAL
CREAT SERPL-MCNC: 1.32 MG/DL (ref 0.7–1.3)
ERYTHROCYTE [DISTWIDTH] IN BLOOD BY AUTOMATED COUNT: 13.2 % (ref 11.5–14.5)
EST. AVERAGE GLUCOSE BLD GHB EST-MCNC: 114 MG/DL
GLOBULIN SER CALC-MCNC: 2.8 G/DL (ref 2–4)
GLUCOSE SERPL-MCNC: 92 MG/DL (ref 65–100)
GLUCOSE UR STRIP.AUTO-MCNC: NEGATIVE MG/DL
HBA1C MFR BLD: 5.6 % (ref 4–5.6)
HCT VFR BLD AUTO: 41 % (ref 36.6–50.3)
HDLC SERPL-MCNC: 71 MG/DL
HDLC SERPL: 1.9 {RATIO} (ref 0–5)
HGB BLD-MCNC: 13.3 G/DL (ref 12.1–17)
HGB UR QL STRIP: NEGATIVE
KETONES UR QL STRIP.AUTO: NEGATIVE MG/DL
LDLC SERPL CALC-MCNC: 52 MG/DL (ref 0–100)
LEUKOCYTE ESTERASE UR QL STRIP.AUTO: NEGATIVE
MCH RBC QN AUTO: 33.1 PG (ref 26–34)
MCHC RBC AUTO-ENTMCNC: 32.4 G/DL (ref 30–36.5)
MCV RBC AUTO: 102 FL (ref 80–99)
NITRITE UR QL STRIP.AUTO: NEGATIVE
NRBC # BLD: 0 K/UL (ref 0–0.01)
NRBC BLD-RTO: 0 PER 100 WBC
PH UR STRIP: 6 [PH] (ref 5–8)
PLATELET # BLD AUTO: 121 K/UL (ref 150–400)
PMV BLD AUTO: 12.5 FL (ref 8.9–12.9)
POTASSIUM SERPL-SCNC: 4.1 MMOL/L (ref 3.5–5.1)
PROT SERPL-MCNC: 6.7 G/DL (ref 6.4–8.2)
PROT UR STRIP-MCNC: NEGATIVE MG/DL
RBC # BLD AUTO: 4.02 M/UL (ref 4.1–5.7)
SODIUM SERPL-SCNC: 144 MMOL/L (ref 136–145)
SP GR UR REFRACTOMETRY: 1.02 (ref 1–1.03)
TRIGL SERPL-MCNC: 55 MG/DL (ref ?–150)
TSH SERPL DL<=0.05 MIU/L-ACNC: 1.01 UIU/ML (ref 0.36–3.74)
UROBILINOGEN UR QL STRIP.AUTO: 0.2 EU/DL (ref 0.2–1)
VLDLC SERPL CALC-MCNC: 11 MG/DL
WBC # BLD AUTO: 6.1 K/UL (ref 4.1–11.1)

## 2021-09-04 DIAGNOSIS — E53.8 B12 DEFICIENCY: ICD-10-CM

## 2021-09-04 DIAGNOSIS — N18.30 CKD (CHRONIC KIDNEY DISEASE) STAGE 3, GFR 30-59 ML/MIN (HCC): ICD-10-CM

## 2021-09-04 DIAGNOSIS — D69.6 PLATELETS DECREASED (HCC): ICD-10-CM

## 2021-09-04 DIAGNOSIS — I25.110 CORONARY ARTERY DISEASE INVOLVING NATIVE HEART WITH UNSTABLE ANGINA PECTORIS, UNSPECIFIED VESSEL OR LESION TYPE (HCC): ICD-10-CM

## 2021-09-04 DIAGNOSIS — N17.9 ACUTE RENAL FAILURE, UNSPECIFIED ACUTE RENAL FAILURE TYPE (HCC): ICD-10-CM

## 2021-09-07 RX ORDER — ROSUVASTATIN CALCIUM 20 MG/1
TABLET, COATED ORAL
Qty: 90 TABLET | Refills: 2 | Status: SHIPPED | OUTPATIENT
Start: 2021-09-07 | End: 2022-05-12

## 2022-03-18 PROBLEM — R07.9 CHEST PAIN AT REST: Status: ACTIVE | Noted: 2018-10-17

## 2022-03-18 PROBLEM — K59.04 CHRONIC IDIOPATHIC CONSTIPATION: Status: ACTIVE | Noted: 2017-04-17

## 2022-03-18 PROBLEM — F32.A ANXIETY AND DEPRESSION: Status: ACTIVE | Noted: 2018-10-18

## 2022-03-18 PROBLEM — K21.9 GASTROESOPHAGEAL REFLUX DISEASE WITHOUT ESOPHAGITIS: Status: ACTIVE | Noted: 2017-04-17

## 2022-03-18 PROBLEM — F41.9 ANXIETY AND DEPRESSION: Status: ACTIVE | Noted: 2018-10-18

## 2022-03-19 PROBLEM — N18.30 CKD (CHRONIC KIDNEY DISEASE) STAGE 3, GFR 30-59 ML/MIN (HCC): Status: ACTIVE | Noted: 2019-06-17

## 2022-03-19 PROBLEM — I25.110 CORONARY ARTERY DISEASE WITH UNSTABLE ANGINA PECTORIS (HCC): Status: ACTIVE | Noted: 2019-06-17

## 2022-03-19 PROBLEM — R82.4 KETONURIA: Status: ACTIVE | Noted: 2017-04-17

## 2022-03-19 PROBLEM — N17.9 ACUTE RENAL FAILURE (HCC): Status: ACTIVE | Noted: 2019-06-17

## 2022-03-20 PROBLEM — R06.09 DOE (DYSPNEA ON EXERTION): Status: ACTIVE | Noted: 2018-10-18

## 2022-05-12 DIAGNOSIS — I25.110 CORONARY ARTERY DISEASE INVOLVING NATIVE HEART WITH UNSTABLE ANGINA PECTORIS, UNSPECIFIED VESSEL OR LESION TYPE (HCC): ICD-10-CM

## 2022-05-12 DIAGNOSIS — N18.30 CKD (CHRONIC KIDNEY DISEASE) STAGE 3, GFR 30-59 ML/MIN (HCC): ICD-10-CM

## 2022-05-12 DIAGNOSIS — N17.9 ACUTE RENAL FAILURE, UNSPECIFIED ACUTE RENAL FAILURE TYPE (HCC): ICD-10-CM

## 2022-05-12 DIAGNOSIS — D69.6 PLATELETS DECREASED (HCC): ICD-10-CM

## 2022-05-12 DIAGNOSIS — E53.8 B12 DEFICIENCY: ICD-10-CM

## 2022-05-12 RX ORDER — ROSUVASTATIN CALCIUM 20 MG/1
TABLET, COATED ORAL
Qty: 90 TABLET | Refills: 2 | Status: SHIPPED | OUTPATIENT
Start: 2022-05-12

## 2022-05-12 RX ORDER — LOSARTAN POTASSIUM AND HYDROCHLOROTHIAZIDE 25; 100 MG/1; MG/1
TABLET ORAL
Qty: 90 TABLET | Refills: 3 | Status: SHIPPED | OUTPATIENT
Start: 2022-05-12

## 2022-05-25 ENCOUNTER — OFFICE VISIT (OUTPATIENT)
Dept: FAMILY MEDICINE CLINIC | Age: 82
End: 2022-05-25
Payer: MEDICARE

## 2022-05-25 VITALS
BODY MASS INDEX: 27.33 KG/M2 | OXYGEN SATURATION: 100 % | HEIGHT: 67 IN | WEIGHT: 174.1 LBS | RESPIRATION RATE: 20 BRPM | SYSTOLIC BLOOD PRESSURE: 138 MMHG | TEMPERATURE: 98.1 F | DIASTOLIC BLOOD PRESSURE: 61 MMHG | HEART RATE: 69 BPM

## 2022-05-25 DIAGNOSIS — J41.8 MIXED SIMPLE AND MUCOPURULENT CHRONIC BRONCHITIS (HCC): ICD-10-CM

## 2022-05-25 DIAGNOSIS — H25.013 CORTICAL AGE-RELATED CATARACT OF BOTH EYES: Primary | ICD-10-CM

## 2022-05-25 DIAGNOSIS — D69.6 THROMBOCYTOPENIA, UNSPECIFIED (HCC): ICD-10-CM

## 2022-05-25 DIAGNOSIS — G63 VITAMIN B12 DEFICIENCY NEUROPATHY (HCC): ICD-10-CM

## 2022-05-25 DIAGNOSIS — N18.30 STAGE 3 CHRONIC KIDNEY DISEASE, UNSPECIFIED WHETHER STAGE 3A OR 3B CKD (HCC): ICD-10-CM

## 2022-05-25 DIAGNOSIS — Z01.818 PREOPERATIVE CLEARANCE: ICD-10-CM

## 2022-05-25 DIAGNOSIS — Z02.89 ENCOUNTER FOR COMPLETION OF FORM WITH PATIENT: ICD-10-CM

## 2022-05-25 DIAGNOSIS — E53.8 VITAMIN B12 DEFICIENCY NEUROPATHY (HCC): ICD-10-CM

## 2022-05-25 PROCEDURE — G8536 NO DOC ELDER MAL SCRN: HCPCS | Performed by: FAMILY MEDICINE

## 2022-05-25 PROCEDURE — 1101F PT FALLS ASSESS-DOCD LE1/YR: CPT | Performed by: FAMILY MEDICINE

## 2022-05-25 PROCEDURE — G8752 SYS BP LESS 140: HCPCS | Performed by: FAMILY MEDICINE

## 2022-05-25 PROCEDURE — G8754 DIAS BP LESS 90: HCPCS | Performed by: FAMILY MEDICINE

## 2022-05-25 PROCEDURE — 99214 OFFICE O/P EST MOD 30 MIN: CPT | Performed by: FAMILY MEDICINE

## 2022-05-25 PROCEDURE — G8427 DOCREV CUR MEDS BY ELIG CLIN: HCPCS | Performed by: FAMILY MEDICINE

## 2022-05-25 PROCEDURE — 1123F ACP DISCUSS/DSCN MKR DOCD: CPT | Performed by: FAMILY MEDICINE

## 2022-05-25 PROCEDURE — G9717 DOC PT DX DEP/BP F/U NT REQ: HCPCS | Performed by: FAMILY MEDICINE

## 2022-05-25 PROCEDURE — G8417 CALC BMI ABV UP PARAM F/U: HCPCS | Performed by: FAMILY MEDICINE

## 2022-05-25 NOTE — PROGRESS NOTES
Chief Complaint   Patient presents with    Pre-op Exam       1. \"Have you been to the ER, urgent care clinic since your last visit? Hospitalized since your last visit? \" No    2. \"Have you seen or consulted any other health care providers outside of the [address] [place] [address] since your last visit? \" No     3. For patients aged 39-70: Has the patient had a colonoscopy / FIT/ Cologuard? No      If the patient is female:    4. For patients aged 41-77: Has the patient had a mammogram within the past 2 years? NA - based on age or sex      11. For patients aged 21-65: Has the patient had a pap smear?  NA - based on age or sex    Health Maintenance Due   Topic Date Due    Pneumococcal 65+ years (1 - PCV) Never done    Shingrix Vaccine Age 50> (1 of 2) Never done    COVID-19 Vaccine (3 - Booster for Suh Peter series) 09/23/2021

## 2022-05-25 NOTE — PATIENT INSTRUCTIONS
Learning About What to Expect at Home After Surgery  What do you need to know when you leave the hospital after surgery? Each person recovers from surgery at a different pace. Your discharge plan will help you leave the hospital safely. It will outline the care you need. And it will give you information about the things you'll need to do at home. Make sure you get your plan in writing. Look for information on:  · What your medicines are and how to take them. · When you need to see the doctor again or get any follow-up tests. · How and when to change bandages. · What to do about any pain or infection. · How active you can be. This may include physical therapy. · How to prevent falls. · Things you can eat or drink and things to avoid. What do you need to know about taking medicines? Your doctor will talk with you about restarting your medicines. The doctor will also tell you about taking any new medicines. · If you take aspirin or some other blood thinner, be sure to talk to your doctor. You will be told if and when to start taking those medicines again. · If your doctor gave you a prescription medicine for pain, take it as prescribed. · If you aren't taking a prescription pain medicine, ask your doctor if you can take an over-the-counter medicine. How can you take care of your incision? If you have a cut (incision), follow your doctor's instructions. If you didn't get instructions, follow this general advice:  · You'll have a bandage over the cut. This helps the incision heal and protects it. ? Change the bandage daily or more often if needed. ? If you have strips of tape on the cut, leave them on until they fall off.  ? If you have stitches or staples, your doctor will tell you when to have them removed. ? If you have skin glue (liquid stitches), leave it on until it falls off. · Gently wash the area daily with warm water, and pat it dry. Don't use hydrogen peroxide or alcohol.   · You may shower 24 to 48 hours after surgery. Before showering, cover the bandage with a plastic bag or use another method of keeping it dry. Pat the incision dry if it gets damp. Don't swim or take a bath until your doctor tells you it's okay. When can you be active again? One of the most important things you can do for yourself after surgery is to get up and move around several times a day. But be careful not to do too much. Here are some tips:  · Don't move quickly or lift anything heavy until your doctor says it is okay. · Taking short walks is a good way to help your body heal.  · Rest when you feel tired. Your doctor may give you instructions on when you can do your normal activities again, such as driving, having sex, and going back to work. What do you need to know about eating? If your doctor told you when you can start eating and what foods you can eat, follow your doctor's instructions. If you did not get instructions, follow this general advice:  · You can eat your normal diet when you feel well. Start with small amounts of food. · If your bowel movements aren't regular right after surgery, try to avoid constipation and straining. Drink plenty of water. Your doctor may suggest fiber, a stool softener, or a mild laxative. What do you do if you have infection or pain? If you have signs of infection, call your doctor. These signs include:  · Increased pain, swelling, warmth, or redness. · Red streaks leading from the incision. · Pus draining from the incision. · A fever. Also call your doctor if you have pain that does not get better after you take pain medicine. Follow-up care is a key part of your treatment and safety. Be sure to make and go to all appointments, and call your doctor if you are having problems. It's also a good idea to know your test results and keep a list of the medicines you take. Where can you learn more?   Go to http://www.gray.com/  Enter [id number] in the search box to learn more about \"Learning About What to Expect at Home After Surgery. \"  Current as of: October 6, 2021               Content Version: 13.2  © 8454-5210 Healthwise[employer]. Care instructions adapted under license by [employer] (which disclaims liability or warranty for this information). If you have questions about a medical condition or this instruction, always ask your healthcare professional. [place] [address] any warranty or liability for your use of this information.

## 2022-05-25 NOTE — PROGRESS NOTES
Preoperative Evaluation    Date of Exam: 2022    Avelino Walton is a 79 y.o. male (:1940) who presents for preoperative evaluation. who presents for preoperative evaluation with current medical hx of right and Left eye cataract disorder for the last couple of years,   pt has been in a stable conditions w/out history of fall, and  stating that the current eyedrops prescription and the glasses are not helping  The vision any more and seeking alternative surgical correction for current medical condition at this time,       pt's physical functioning is capable of doing >5 blocks of walking w/out getting shortness of breath, at this time there is no assistive devices used physically,   Patient with a good social support which include living at home, the pt is self cared, and the pt's other primary care giver is  at home,  no recent major trauma, does not have any history of blood clots, patient currently on daily baby dose aspirin as the only blood thinner for many years stating that the pt has had no hx of abnormal bleeding or easy bruisabiltity.   In addition pt is currently not taking any herbal supplements, nor any illicit drugs, current status of  for low pt is stable except for morbid obesity       Procedure/Surgery: Heidi Carrera  Date of Procedure/Surgery:   Surgeon: Dr. 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 ([address] 2019    CKD (chronic kidney disease) stage 3, GFR 30-59 ml/min ([address] 2019    Acute renal failure ([address] 2019    SINGH (dyspnea on exertion) 10/18/2018    Anxiety and depression 10/18/2018    Chest pain at rest 10/17/2018    Chronic idiopathic constipation 2017    Gastroesophageal reflux disease without esophagitis 2017    Ketonuria 2017    Swelling of right hand 07/07/2016    Cat scratch of right forearm with infection 06/30/2016    Cough due to ACE inhibitor 04/25/2016    Chronic bronchitis ([address] 04/05/2016    H/O asbestos exposure 04/05/2016    Acute bronchitis 03/16/2016    ASHD (arteriosclerotic heart disease) 10/19/2015    Breast lump on left side at 11 o'clock position 05/07/2015    T12 compression fracture (HCC) 11/10/2014    Back pain, chronic 11/07/2014    Macrocytic anemia 01/30/2013    Prediabetes 01/30/2013    Hyperbilirubinemia 01/30/2013    Elevated PSA 01/30/2013    Hypercholesterolemia 01/14/2013    Urinary frequency 01/14/2013    HTN, goal below 130/80 01/14/2013    Coronary atherosclerosis of artery bypass graft 12/24/2012    Chronic ischemic heart disease 12/24/2012    S/P CABG x 3      Medical History:     Past Medical History:   Diagnosis Date    Acute bronchitis 3/16/2016    Back pain, chronic 11/7/2014    Breast lump on left side at 11 o'clock position 5/7/2015    CAD (coronary artery disease)     Cat scratch of right forearm with infection 6/30/2016    Chronic bronchitis ([address] 4/5/2016    Chronic idiopathic constipation 4/17/2017    Gastroesophageal reflux disease without esophagitis 4/17/2017    H/O asbestos exposure 4/5/2016    H/O asbestos exposure 4/5/2016    Hypercholesterolemia     Hypertension     Ketonuria 4/17/2017    Need for diphtheria-tetanus-pertussis (Tdap) vaccine, adult/adolescent 4/4/2014    Need for pneumococcal vaccination 4/4/2014    Needs flu shot 4/4/2014    S/P CABG x 3     2001    Swelling of right hand 7/7/2016    T12 compression fracture ([facility] 11/10/2014     Allergies:      Allergies   Allergen Reactions    Ace Inhibitors Cough      Medications:     Current Outpatient Medications   Medication Sig    losartan-hydroCHLOROthiazide (HYZAAR) 100-25 mg per tablet TAKE 1 TABLET EVERY DAY FOR HYPERTENSION    rosuvastatin (CRESTOR) 20 mg tablet TAKE 1 TABLET EVERY NIGHT    omeprazole (PRILOSEC) 40 mg capsule TAKE 1 CAPSULE EVERY DAY (SUBSTITUTED FOR PRILOSEC)    calcium-cholecalciferol, D3, (CALTRATE 600+D) tablet Take 1 Tab by mouth daily.  aspirin 81 mg tablet Take 81 mg by mouth daily.  MULTIVITAMIN (MULTIPLE VITAMIN PO) Take 1 Tablet by mouth daily.  isosorbide mononitrate ER (IMDUR) 30 mg tablet Take 1 Tab by mouth daily. (Patient not taking: Reported on 5/25/2022)    clemastine 2.68 mg tab tablet Take  by mouth two (2) times a day. (Patient not taking: Reported on 8/31/2021)    nitroglycerin (NITROSTAT) 0.4 mg SL tablet 1 Tab by SubLINGual route every five (5) minutes as needed for Chest Pain. Up to 3 doses. (Patient not taking: Reported on 5/25/2022)    COLACE 100 mg capsule Take 100 mg by mouth daily as needed. (Patient not taking: Reported on 8/31/2021)    vitamins a & d cap  (Patient not taking: Reported on 8/31/2021)     No current facility-administered medications for this visit. Surgical History:     Past Surgical History:   Procedure Laterality Date    NC CARDIAC SURG PROCEDURE UNLIST      CABG    NC CARDIAC SURG PROCEDURE UNLIST  2000    bypass    UPPER GI ENDOSCOPY,BIOPSY  10/10/2019         UPPER GI ENDOSCOPY,DILATN W GUIDE  10/10/2019          Social History:     Social History     Socioeconomic History    Marital status:    Tobacco Use    Smoking status: Never Smoker    Smokeless tobacco: Never Used   Substance and Sexual Activity    Alcohol use:  Yes     Alcohol/week: 0.0 standard drinks     Comment: occasionally    Drug use: No    Sexual activity: Not Currently       Recent use of: ASA    Tetanus up to date: last tetanus booster within 10 years      Anesthesia Complications: None  History of abnormal bleeding : None  History of Blood Transfusions: no  Health Care Directive or Living Will: yes    REVIEW OF SYSTEMS:      Constitutional: no chills and fever,  , nad     HENT: no ear pain or nosebleeds, ++blurred vision    Respiratory: no shortness of breath, wheezing cough     Cardiovascular: Has no chest pain, ,and racing heart . Gastrointestinal: No constipation, diarrhea, nausea and vomiting. Genitourinary: No frequency. Musculoskeletal: Negative for joint pain. Skin: no itching, no rash. Neurological: Negative for dizziness, no tremors  Psychiatric/Behavioral: Negative for depression   is not nervous/anxious. and not depressed the patient is not nervous/anxious. EXAM:   Visit Vitals  Wt 174 lb 1.6 oz (79 kg)   BMI 27.27 kg/m²         General:  alert cooperative appears stated for the age  [de-identified]; normocephalic and atraumatic PERRLA extraocular motor intact normal tympanic membrane normal external ear bilaterally, mucosal normal no drainage  Neck: supple no adenopathy no JVD no bruit  Lungs: Clear to auscultation bilaterally no rales rhonchi or wheezes  Heart: Normal regular S1-S2 ,  no murmur  Breast exam deferred  Abdomen: Soft nontender normal bowel sounds   Extremities: Normal range of motion no point tenderness normal pulses no edema  Pelvic/: No lesion, no lymphadenopathy  Skin: Normal no lesion no rash        DIAGNOSTICS:   1. EKG: EKG FINDINGS - not indicated  2. CXR: not indicated  3.  Labs:   Lab Results   Component Value Date/Time    WBC 6.1 08/31/2021 03:34 PM    HGB 13.3 08/31/2021 03:34 PM    HCT 41.0 08/31/2021 03:34 PM    PLATELET 617 (L) 80/38/6366 03:34 PM    .0 (H) 08/31/2021 03:34 PM     Lab Results   Component Value Date/Time    Hemoglobin A1c 5.6 08/31/2021 03:34 PM    Hemoglobin A1c 5.9 (H) 09/24/2020 04:03 PM    Hemoglobin A1c 5.7 (H) 01/16/2020 03:02 PM    Glucose 92 08/31/2021 03:34 PM    Glucose POC 93mg/dl 01/14/2013 02:35 PM    LDL, calculated 52 08/31/2021 03:34 PM    Creatinine (POC) 1.2 04/18/2016 09:02 AM    Creatinine 1.32 (H) 08/31/2021 03:34 PM      Lab Results   Component Value Date/Time    Cholesterol, total 134 08/31/2021 03:34 PM    HDL Cholesterol 71 08/31/2021 03:34 PM    LDL, calculated 52 08/31/2021 03:34 PM    Triglyceride 55 08/31/2021 03:34 PM    CHOL/HDL Ratio 1.9 08/31/2021 03:34 PM     Lab Results   Component Value Date/Time    GFR est non-AA 52 (L) 08/31/2021 03:34 PM    GFRNA, POC 59 (L) 04/18/2016 09:02 AM    GFR est AA >60 08/31/2021 03:34 PM    GFRAA, POC >60 04/18/2016 09:02 AM    Creatinine 1.32 (H) 08/31/2021 03:34 PM    Creatinine (POC) 1.2 04/18/2016 09:02 AM    BUN 21 (H) 08/31/2021 03:34 PM    Sodium 144 08/31/2021 03:34 PM    Potassium 4.1 08/31/2021 03:34 PM    Chloride 109 (H) 08/31/2021 03:34 PM    CO2 30 08/31/2021 03:34 PM       IMPRESSION:     No contraindications to planned surgery    Winnie Sellers MD   5/25/2022

## 2022-08-23 ENCOUNTER — OFFICE VISIT (OUTPATIENT)
Dept: FAMILY MEDICINE CLINIC | Age: 82
End: 2022-08-23

## 2022-08-23 DIAGNOSIS — R53.83 OTHER FATIGUE: ICD-10-CM

## 2022-08-23 DIAGNOSIS — E78.00 HYPERCHOLESTEROLEMIA: ICD-10-CM

## 2022-08-23 DIAGNOSIS — I10 ESSENTIAL HYPERTENSION: ICD-10-CM

## 2022-08-23 DIAGNOSIS — R53.83 LOW ENERGY: ICD-10-CM

## 2022-08-23 DIAGNOSIS — R73.03 PREDIABETES: ICD-10-CM

## 2022-08-23 DIAGNOSIS — N18.30 STAGE 3 CHRONIC KIDNEY DISEASE, UNSPECIFIED WHETHER STAGE 3A OR 3B CKD (HCC): ICD-10-CM

## 2022-08-23 DIAGNOSIS — E80.6 HYPERBILIRUBINEMIA: Primary | ICD-10-CM

## 2022-08-23 LAB
ALBUMIN SERPL-MCNC: 3.5 G/DL (ref 3.5–5)
ALBUMIN/GLOB SERPL: 1.3 {RATIO} (ref 1.1–2.2)
ALP SERPL-CCNC: 43 U/L (ref 45–117)
ALT SERPL-CCNC: 21 U/L (ref 12–78)
ANION GAP SERPL CALC-SCNC: 5 MMOL/L (ref 5–15)
AST SERPL-CCNC: 20 U/L (ref 15–37)
BILIRUB SERPL-MCNC: 1.4 MG/DL (ref 0.2–1)
BUN SERPL-MCNC: 19 MG/DL (ref 6–20)
BUN/CREAT SERPL: 16 (ref 12–20)
CALCIUM SERPL-MCNC: 8.8 MG/DL (ref 8.5–10.1)
CHLORIDE SERPL-SCNC: 107 MMOL/L (ref 97–108)
CHOLEST SERPL-MCNC: 116 MG/DL
CO2 SERPL-SCNC: 29 MMOL/L (ref 21–32)
CREAT SERPL-MCNC: 1.19 MG/DL (ref 0.7–1.3)
ERYTHROCYTE [DISTWIDTH] IN BLOOD BY AUTOMATED COUNT: 13.3 % (ref 11.5–14.5)
EST. AVERAGE GLUCOSE BLD GHB EST-MCNC: 114 MG/DL
GLOBULIN SER CALC-MCNC: 2.7 G/DL (ref 2–4)
GLUCOSE SERPL-MCNC: 96 MG/DL (ref 65–100)
HBA1C MFR BLD: 5.6 % (ref 4–5.6)
HCT VFR BLD AUTO: 35.1 % (ref 36.6–50.3)
HDLC SERPL-MCNC: 59 MG/DL
HDLC SERPL: 2 {RATIO} (ref 0–5)
HGB BLD-MCNC: 12 G/DL (ref 12.1–17)
LDLC SERPL CALC-MCNC: 45.4 MG/DL (ref 0–100)
MCH RBC QN AUTO: 33.9 PG (ref 26–34)
MCHC RBC AUTO-ENTMCNC: 34.2 G/DL (ref 30–36.5)
MCV RBC AUTO: 99.2 FL (ref 80–99)
NRBC # BLD: 0 K/UL (ref 0–0.01)
NRBC BLD-RTO: 0 PER 100 WBC
PLATELET # BLD AUTO: 119 K/UL (ref 150–400)
PMV BLD AUTO: 11.9 FL (ref 8.9–12.9)
POTASSIUM SERPL-SCNC: 3.5 MMOL/L (ref 3.5–5.1)
PROT SERPL-MCNC: 6.2 G/DL (ref 6.4–8.2)
RBC # BLD AUTO: 3.54 M/UL (ref 4.1–5.7)
SODIUM SERPL-SCNC: 141 MMOL/L (ref 136–145)
T4 SERPL-MCNC: 7.5 UG/DL (ref 4.5–12.1)
TRIGL SERPL-MCNC: 58 MG/DL (ref ?–150)
TSH SERPL DL<=0.05 MIU/L-ACNC: 1.11 UIU/ML (ref 0.36–3.74)
VLDLC SERPL CALC-MCNC: 11.6 MG/DL
WBC # BLD AUTO: 5.1 K/UL (ref 4.1–11.1)

## 2022-08-30 ENCOUNTER — OFFICE VISIT (OUTPATIENT)
Dept: FAMILY MEDICINE CLINIC | Age: 82
End: 2022-08-30
Payer: MEDICARE

## 2022-08-30 VITALS
OXYGEN SATURATION: 96 % | BODY MASS INDEX: 27.09 KG/M2 | SYSTOLIC BLOOD PRESSURE: 128 MMHG | HEART RATE: 65 BPM | TEMPERATURE: 98.3 F | HEIGHT: 67 IN | WEIGHT: 172.6 LBS | DIASTOLIC BLOOD PRESSURE: 62 MMHG | RESPIRATION RATE: 13 BRPM

## 2022-08-30 DIAGNOSIS — Z71.89 ADVANCED DIRECTIVES, COUNSELING/DISCUSSION: ICD-10-CM

## 2022-08-30 DIAGNOSIS — Z00.00 MEDICARE ANNUAL WELLNESS VISIT, SUBSEQUENT: Primary | ICD-10-CM

## 2022-08-30 PROCEDURE — 1123F ACP DISCUSS/DSCN MKR DOCD: CPT | Performed by: FAMILY MEDICINE

## 2022-08-30 PROCEDURE — 1101F PT FALLS ASSESS-DOCD LE1/YR: CPT | Performed by: FAMILY MEDICINE

## 2022-08-30 PROCEDURE — G8427 DOCREV CUR MEDS BY ELIG CLIN: HCPCS | Performed by: FAMILY MEDICINE

## 2022-08-30 PROCEDURE — G8754 DIAS BP LESS 90: HCPCS | Performed by: FAMILY MEDICINE

## 2022-08-30 PROCEDURE — G9717 DOC PT DX DEP/BP F/U NT REQ: HCPCS | Performed by: FAMILY MEDICINE

## 2022-08-30 PROCEDURE — G0439 PPPS, SUBSEQ VISIT: HCPCS | Performed by: FAMILY MEDICINE

## 2022-08-30 PROCEDURE — G8536 NO DOC ELDER MAL SCRN: HCPCS | Performed by: FAMILY MEDICINE

## 2022-08-30 PROCEDURE — G8752 SYS BP LESS 140: HCPCS | Performed by: FAMILY MEDICINE

## 2022-08-30 PROCEDURE — G8417 CALC BMI ABV UP PARAM F/U: HCPCS | Performed by: FAMILY MEDICINE

## 2022-08-30 RX ORDER — POTASSIUM CHLORIDE 750 MG/1
10 TABLET, EXTENDED RELEASE ORAL DAILY
Qty: 30 TABLET | Refills: 2 | Status: SHIPPED | OUTPATIENT
Start: 2022-08-30

## 2022-08-30 RX ORDER — LANOLIN ALCOHOL/MO/W.PET/CERES
1000 CREAM (GRAM) TOPICAL DAILY
Qty: 30 TABLET | Refills: 6
Start: 2022-08-30

## 2022-08-30 NOTE — PATIENT INSTRUCTIONS
Medicare Wellness Visit, Male    The best way to live healthy is to have a lifestyle where you eat a well-balanced diet, exercise regularly, limit alcohol use, and quit all forms of tobacco/nicotine, if applicable. Regular preventive services are another way to keep healthy. Preventive services (vaccines, screening tests, monitoring & exams) can help personalize your care plan, which helps you manage your own care. Screening tests can find health problems at the earliest stages, when they are easiest to treat. [facility] follows the current, evidence-based guidelines published by the [facility] ([facility]) when recommending preventive services for our patients. Because we follow these guidelines, sometimes recommendations change over time as research supports it. (For example, a prostate screening blood test is no longer routinely recommended for men with no symptoms). Of course, you and your doctor may decide to screen more often for some diseases, based on your risk and co-morbidities (chronic disease you are already diagnosed with). Preventive services for you include:  - Medicare offers their members a free annual wellness visit, which is time for you and your primary care provider to discuss and plan for your preventive service needs. Take advantage of this benefit every year!  -All adults over age 72 should receive the recommended pneumonia vaccines. Current USPSTF guidelines recommend a series of two vaccines for the best pneumonia protection.   -All adults should have a flu vaccine yearly and tetanus vaccine every 10 years.  -All adults age 48 and older should receive the shingles vaccines (series of two vaccines).        -All adults age 38-68 who are overweight should have a diabetes screening test once every three years.   -Other screening tests & preventive services for persons with diabetes include: an eye exam to screen for diabetic retinopathy, a kidney function test, a foot exam, and stricter control over your cholesterol.   -Cardiovascular screening for adults with routine risk involves an electrocardiogram (ECG) at intervals determined by the provider.   -Colorectal cancer screening should be done for adults age 54-65 with no increased risk factors for colorectal cancer. There are a number of acceptable methods of screening for this type of cancer. Each test has its own benefits and drawbacks. Discuss with your provider what is most appropriate for you during your annual wellness visit. The different tests include: colonoscopy (considered the best screening method), a fecal occult blood test, a fecal DNA test, and sigmoidoscopy.  -All adults born between [facility] should be screened once for Hepatitis C.  -An Abdominal Aortic Aneurysm (AAA) Screening is recommended for men age 73-68 who has ever smoked in their lifetime.      Here is a list of your current Health Maintenance items (your personalized list of preventive services) with a due date:  Health Maintenance Due   Topic Date Due    Pneumococcal Vaccine (1 - PCV) Never done    Shingles Vaccine (1 of 2) Never done    COVID-19 Vaccine (3 - Booster for Pfizer series) 09/23/2021

## 2022-08-30 NOTE — PROGRESS NOTES
Chief Complaint   Patient presents with    Annual Wellness Visit       1. \"Have you been to the ER, urgent care clinic since your last visit? Hospitalized since your last visit? \" No    2. \"Have you seen or consulted any other health care providers outside of the [address] [place] [address] since your last visit? \" No     3. For patients aged 39-70: Has the patient had a colonoscopy / FIT/ Cologuard? No      If the patient is female:    4. For patients aged 41-77: Has the patient had a mammogram within the past 2 years? NA - based on age or sex      11. For patients aged 21-65: Has the patient had a pap smear?  NA - based on age or sex    Health Maintenance Due   Topic Date Due    Pneumococcal 65+ years (1 - PCV) Never done    Shingrix Vaccine Age 50> (1 of 2) Never done    COVID-19 Vaccine (3 - Booster for Pfizer series) 09/23/2021    Medicare Yearly Exam  09/01/2022

## 2022-08-30 NOTE — PROGRESS NOTES
This is the Subsequent Medicare Annual Wellness Exam, performed 12 months or more after the Initial AWV or the last Subsequent AWV    I have reviewed the patient's medical history in detail and updated the computerized patient record. RBC 4.10 - 5.70 M/uL 3.54 Low   4.02 Low   4.14 R  4.35 R  4.19 R  3.72 Low  R  3.69 Low  R    HGB 12.1 - 17.0 g/dL 12.0 Low   13.3  13.6 R  14.5 R  13.5 R  11.2 Low  R  11.8 Low  R    HCT 36.6 - 50.3 % 35.1 Low   41.0  40.6 R  42.9 R  41.1 R  35.5 Low  R  36.0 Low  R    MCV 80.0 - 99.0 FL 99.2 High             HDL Cholesterol MG/DL 59       GFR est non-AA >60 ml/min/1.73m2 58 Low      Assessment/Plan   Education and counseling provided:  Are appropriate based on today's review and evaluation  End-of-Life planning (with patient's consent)    1. Medicare annual wellness visit, subsequent  -     ADVANCE CARE PLANNING FIRST [facility]  2. Advanced directives, counseling/discussion  -     ADVANCE CARE PLANNING FIRST 30 MINS  -     FULL CODE       Depression Risk Factor Screening     3 most recent PHQ Screens 8/30/2022   Little interest or pleasure in doing things Not at all   Feeling down, depressed, irritable, or hopeless Not at all   Total Score PHQ 2 0       Alcohol & Drug Abuse Risk Screen    Do you average more than 1 drink per night or more than 7 drinks a week: No    In the past three months have you have had more than 4 drinks containing alcohol on one occasion: No          Functional Ability and Level of Safety    Hearing: Hearing is good. The patient wears hearing aids. Activities of Daily Living: The home contains: handrails, grab bars, and rugs  Patient does total self care      Ambulation: with no difficulty     Fall Risk:  Fall Risk Assessment, last 12 mths 8/30/2022   Able to walk? Yes   Fall in past 12 months? 0   Do you feel unsteady?  0   Are you worried about falling 0      Abuse Screen:  Patient is not abused       Cognitive Screening    Has your family/caregiver stated any concerns about your memory: no     Cognitive Screening: Normal - MMSE (Mini Mental Status Exam)    Health Maintenance Due     Health Maintenance Due   Topic Date Due    Pneumococcal 65+ years (1 - PCV) Never done    Shingrix Vaccine Age 50> (1 of 2) Never done    COVID-19 Vaccine (3 - Booster for Pfizer series) 09/23/2021       Patient Care Team   Patient Care Team:  Kenneth Rolon MD as PCP - General (Family Medicine)  Kenneth Rolon MD as PCP - [facility] Provider  Antonieta Leal MD as Physician (Cardiovascular Disease Physician)    History     Patient Active Problem List   Diagnosis Code    Coronary atherosclerosis of artery bypass graft I25.810    S/P CABG x 3 Z95.1    Chronic ischemic heart disease I25.9    Hypercholesterolemia E78.00    Urinary frequency R35.0    HTN, goal below 130/80 I10    Macrocytic anemia D53.9    Prediabetes R73.03    Hyperbilirubinemia E80.6    Elevated PSA R97.20    Back pain, chronic M54.9, G89.29    T12 compression fracture (HCC) S22.080A    Breast lump on left side at 11 o'clock position N63.22    ASHD (arteriosclerotic heart disease) I25.10    Acute bronchitis J20.9    Chronic bronchitis (HCC) J42    H/O asbestos exposure Z77.090    Cough due to ACE inhibitor R05.8, T46.4X5A    Cat scratch of right forearm with infection S50.811A, L08.9, W55.03XA    Swelling of right hand M79.89    Chronic idiopathic constipation K59.04    Gastroesophageal reflux disease without esophagitis K21.9    Ketonuria R82.4    Chest pain at rest R07.9    SINGH (dyspnea on exertion) R06.00    Anxiety and depression F41.9, F32. A    Coronary artery disease with unstable angina pectoris (HCC) I25.110    CKD (chronic kidney disease) stage 3, GFR 30-59 ml/min (HCC) N18.30    Acute renal failure (HCC) N17.9    Vitamin B12 deficiency neuropathy (HCC) E53.8, G63    Thrombocytopenia, unspecified (HCC) D69.6     Past Medical History:   Diagnosis Date    Acute bronchitis 3/16/2016    Back pain, chronic 11/7/2014    Breast lump on left side at 11 o'clock position 5/7/2015    CAD (coronary artery disease)     Cat scratch of right forearm with infection 6/30/2016    Chronic bronchitis ([facility] 4/5/2016    Chronic idiopathic constipation 4/17/2017    Gastroesophageal reflux disease without esophagitis 4/17/2017    H/O asbestos exposure 4/5/2016    H/O asbestos exposure 4/5/2016    Hypercholesterolemia     Hypertension     Ketonuria 4/17/2017    Need for diphtheria-tetanus-pertussis (Tdap) vaccine, adult/adolescent 4/4/2014    Need for pneumococcal vaccination 4/4/2014    Needs flu shot 4/4/2014    S/P CABG x 3     2001    Swelling of right hand 7/7/2016    T12 compression fracture ([facility] 11/10/2014      Past Surgical History:   Procedure Laterality Date    HX CATARACT REMOVAL  03/2022    Dr. Alejandra Lopez      CABG    SC CARDIAC SURG PROCEDURE UNLIST  2000    bypass    UPPER GI ENDOSCOPY,BIOPSY  10/10/2019         UPPER GI ENDOSCOPY,DILATN W GUIDE  10/10/2019          Current Outpatient Medications   Medication Sig Dispense Refill    losartan-hydroCHLOROthiazide (HYZAAR) 100-25 mg per tablet TAKE 1 TABLET EVERY DAY FOR HYPERTENSION 90 Tablet 3    rosuvastatin (CRESTOR) 20 mg tablet TAKE 1 TABLET EVERY NIGHT 90 Tablet 2    omeprazole (PRILOSEC) 40 mg capsule TAKE 1 CAPSULE EVERY DAY (SUBSTITUTED FOR PRILOSEC) 90 Cap 2    calcium-cholecalciferol, D3, (CALTRATE 600+D) tablet Take 1 Tab by mouth daily. 60 Tab 11    aspirin 81 mg tablet Take 81 mg by mouth daily. isosorbide mononitrate ER (IMDUR) 30 mg tablet Take 1 Tab by mouth daily. (Patient not taking: No sig reported) 90 Tab 3    clemastine 2.68 mg tab tablet Take  by mouth two (2) times a day. (Patient not taking: No sig reported)      nitroglycerin (NITROSTAT) 0.4 mg SL tablet 1 Tab by SubLINGual route every five (5) minutes as needed for Chest Pain. Up to 3 doses.  (Patient not taking: No sig reported) 30 Tab 3    COLACE 100 mg capsule Take 100 mg by mouth daily as needed. (Patient not taking: No sig reported)      vitamins a & d cap  (Patient not taking: No sig reported)      MULTIVITAMIN (MULTIPLE VITAMIN PO) Take 1 Tablet by mouth daily. (Patient not taking: Reported on 8/30/2022)       Allergies   Allergen Reactions    Ace Inhibitors Cough       Family History   Problem Relation Age of Onset    Heart Disease Mother     Cancer Father      Social History     Tobacco Use    Smoking status: Never    Smokeless tobacco: Never   Substance Use Topics    Alcohol use:  Yes     Alcohol/week: 0.0 standard drinks     Comment: occasionally         Robert Morales MD

## 2022-08-30 NOTE — ACP (ADVANCE CARE PLANNING)
Advance Care Planning     Advance Care Planning (ACP) Physician/NP/PA Conversation      Date of Conversation: 8/30/2022      [employer] with:   Patient with Decision Making Capacity, stating that the Other Legally Authorized Decision Maker would be Je Lehman, as SDM         Patient is on presence of no family member,, stated that the pt wants to be not DNR at this time,  The pt likes to be a full code individual,  The patient states that there is a lot of will to live,      Conversation Outcomes / Follow-Up Plan:   Completed new Advance Directive      Length of ACP Conversation in minutes:  12 minutes          Rosetta Jaimes MD

## 2023-02-08 ENCOUNTER — OFFICE VISIT (OUTPATIENT)
Dept: FAMILY MEDICINE CLINIC | Age: 83
End: 2023-02-08
Payer: MEDICARE

## 2023-02-08 VITALS
HEIGHT: 67 IN | TEMPERATURE: 97.9 F | BODY MASS INDEX: 27.47 KG/M2 | WEIGHT: 175 LBS | HEART RATE: 66 BPM | DIASTOLIC BLOOD PRESSURE: 79 MMHG | SYSTOLIC BLOOD PRESSURE: 136 MMHG | RESPIRATION RATE: 17 BRPM | OXYGEN SATURATION: 96 %

## 2023-02-08 DIAGNOSIS — R35.0 URINARY FREQUENCY: ICD-10-CM

## 2023-02-08 DIAGNOSIS — E80.6 HYPERBILIRUBINEMIA: ICD-10-CM

## 2023-02-08 DIAGNOSIS — N18.30 STAGE 3 CHRONIC KIDNEY DISEASE, UNSPECIFIED WHETHER STAGE 3A OR 3B CKD (HCC): ICD-10-CM

## 2023-02-08 DIAGNOSIS — J41.8 MIXED SIMPLE AND MUCOPURULENT CHRONIC BRONCHITIS (HCC): ICD-10-CM

## 2023-02-08 DIAGNOSIS — R73.03 PREDIABETES: ICD-10-CM

## 2023-02-08 DIAGNOSIS — E53.8 VITAMIN B12 DEFICIENCY NEUROPATHY (HCC): ICD-10-CM

## 2023-02-08 DIAGNOSIS — G63 VITAMIN B12 DEFICIENCY NEUROPATHY (HCC): ICD-10-CM

## 2023-02-08 DIAGNOSIS — R53.83 LOW ENERGY: ICD-10-CM

## 2023-02-08 DIAGNOSIS — Z71.89 ADVANCED DIRECTIVES, COUNSELING/DISCUSSION: ICD-10-CM

## 2023-02-08 DIAGNOSIS — Z00.00 MEDICARE ANNUAL WELLNESS VISIT, SUBSEQUENT: Primary | ICD-10-CM

## 2023-02-08 DIAGNOSIS — E78.00 HYPERCHOLESTEROLEMIA: ICD-10-CM

## 2023-02-08 DIAGNOSIS — D69.6 THROMBOCYTOPENIA, UNSPECIFIED (HCC): ICD-10-CM

## 2023-02-08 PROBLEM — H01.029 SQUAMOUS BLEPHARITIS: Status: ACTIVE | Noted: 2023-02-08

## 2023-02-08 PROBLEM — Z96.1 PSEUDOPHAKIA OF RIGHT EYE: Status: ACTIVE | Noted: 2023-02-08

## 2023-02-08 RX ORDER — NEOMYCIN SULFATE, POLYMYXIN B SULFATE AND DEXAMETHASONE 3.5; 10000; 1 MG/ML; [USP'U]/ML; MG/ML
1 SUSPENSION/ DROPS OPHTHALMIC 2 TIMES DAILY
Qty: 5 ML | Refills: 0 | Status: SHIPPED | OUTPATIENT
Start: 2023-02-08 | End: 2023-02-18

## 2023-02-08 NOTE — PATIENT INSTRUCTIONS
Medicare Wellness Visit, Male    The best way to live healthy is to have a lifestyle where you eat a well-balanced diet, exercise regularly, limit alcohol use, and quit all forms of tobacco/nicotine, if applicable. Regular preventive services are another way to keep healthy. Preventive services (vaccines, screening tests, monitoring & exams) can help personalize your care plan, which helps you manage your own care. Screening tests can find health problems at the earliest stages, when they are easiest to treat. [facility] follows the current, evidence-based guidelines published by the [facility] ([facility]) when recommending preventive services for our patients. Because we follow these guidelines, sometimes recommendations change over time as research supports it. (For example, a prostate screening blood test is no longer routinely recommended for men with no symptoms). Of course, you and your doctor may decide to screen more often for some diseases, based on your risk and co-morbidities (chronic disease you are already diagnosed with). Preventive services for you include:  - Medicare offers their members a free annual wellness visit, which is time for you and your primary care provider to discuss and plan for your preventive service needs.  Take advantage of this benefit every year!    -Over the age of 72 should receive the recommended pneumonia vaccines.   -All adults should have a flu vaccine yearly.  -All adults should receive a tetanus vaccine every 10 years.  -Over the age of 48 should receive the shingles vaccines.    -All adults should be screened once for Hepatitis C.  -All adults age 38-68 who are overweight should have a diabetes screening test once every three years.   -Other screening tests & preventive services for persons with diabetes include: an eye exam to screen for diabetic retinopathy, a kidney function test, a foot exam, and stricter control over your cholesterol.   -Cardiovascular screening for adults with routine risk involves an electrocardiogram (ECG) at intervals determined by the provider.     -Colorectal cancer screening should be done for adults age 43-69 with no increased risk factors for colorectal cancer. There are a number of acceptable methods of screening for this type of cancer. Each test has its own benefits and drawbacks. Discuss with your provider what is most appropriate for you during your annual wellness visit. The different tests include: colonoscopy (considered the best screening method), a fecal occult blood test, a fecal DNA test, and sigmoidoscopy.    -Lung cancer screening is recommended annually with a low dose CT scan for adults between age 54 and 68, who have smoked at least 30 pack years (equivalent of 1 pack per day for 30 days), and who is a current smoker or quit less than 15 years ago. -An Abdominal Aortic Aneurysm (AAA) Screening is recommended for men age 73-68 who has ever smoked in their lifetime.      Here is a list of your current Health Maintenance items (your personalized list of preventive services) with a due date:  Health Maintenance Due   Topic Date Due    Pneumococcal Vaccine (1 - PCV) Never done    Shingles Vaccine (1 of 2) Never done    COVID-19 Vaccine (3 - Booster for Pfizer series) 06/18/2021    Yearly Flu Vaccine (1) Never done

## 2023-02-08 NOTE — LETTER
2/17/2023    Mr. Lupillo [facility] [id number]      Dear [facility] Sr:    Please find your most recent results below. Recent Results (from the past 672 hour(s))   URINALYSIS W/ RFLX MICROSCOPIC    Collection Time: 02/08/23  3:23 PM   Result Value Ref Range    Color YELLOW/STRAW      Appearance CLEAR CLEAR      Specific gravity 1.010 1.003 - 1.030      pH (UA) 6.5 5.0 - 8.0      Protein Negative Negative mg/dL    Glucose Negative Negative mg/dL    Ketone Negative Negative mg/dL    Bilirubin Negative Negative      Blood Negative Negative      Urobilinogen 0.2 0.2 - 1.0 EU/dL    Nitrites Negative Negative      Leukocyte Esterase Negative Negative     T4, FREE    Collection Time: 02/08/23  3:23 PM   Result Value Ref Range    T4, Free 1.0 0.8 - 1.5 NG/DL   TSH 3RD GENERATION    Collection Time: 02/08/23  3:23 PM   Result Value Ref Range    TSH 0.81 0.36 - 6.74 uIU/mL   METABOLIC PANEL, COMPREHENSIVE    Collection Time: 02/08/23  3:23 PM   Result Value Ref Range    Sodium 139 136 - 145 mmol/L    Potassium 4.1 3.5 - 5.1 mmol/L    Chloride 104 97 - 108 mmol/L    CO2 30 21 - 32 mmol/L    Anion gap 5 5 - 15 mmol/L    Glucose 95 65 - 100 mg/dL    BUN 18 6 - 20 MG/DL    Creatinine 1.24 0.70 - 1.30 MG/DL    BUN/Creatinine ratio 15 12 - 20      eGFR 58 (L) >60 ml/min/1.73m2    Calcium 9.8 8.5 - 10.1 MG/DL    Bilirubin, total 1.6 (H) 0.2 - 1.0 MG/DL    ALT (SGPT) 28 12 - 78 U/L    AST (SGOT) 30 15 - 37 U/L    Alk.  phosphatase 44 (L) 45 - 117 U/L    Protein, total 6.7 6.4 - 8.2 g/dL    Albumin 4.1 3.5 - 5.0 g/dL    Globulin 2.6 2.0 - 4.0 g/dL    A-G Ratio 1.6 1.1 - 2.2     HEMOGLOBIN A1C WITH EAG    Collection Time: 02/08/23  3:23 PM   Result Value Ref Range    Hemoglobin A1c 5.6 4.0 - 5.6 %    Est. average glucose 114 mg/dL   LIPID PANEL    Collection Time: 02/08/23  3:23 PM   Result Value Ref Range    Cholesterol, total 142 <200 MG/DL    Triglyceride 54 <150 MG/DL    HDL Cholesterol 73 MG/DL    LDL, calculated 58.2 0 - 100 MG/DL    VLDL, calculated 10.8 MG/DL    CHOL/HDL Ratio 1.9 0.0 - 5.0     CBC W/O DIFF    Collection Time: 02/08/23  3:23 PM   Result Value Ref Range    WBC 5.2 4.1 - 11.1 K/uL    RBC 4.28 4.10 - 5.70 M/uL    HGB 13.6 12.1 - 17.0 g/dL    HCT 42.7 36.6 - 50.3 %    MCV 99.8 (H) 80.0 - 99.0 FL    MCH 31.8 26.0 - 34.0 PG    MCHC 31.9 30.0 - 36.5 g/dL    RDW 12.7 11.5 - 14.5 %    PLATELET 252 (L) 783 - 400 K/uL    MPV 12.0 8.9 - 12.9 FL    NRBC 0.0 0  WBC    ABSOLUTE NRBC 0.00 0.00 - 0.01 K/uL   URINE CULTURE HOLD SAMPLE    Collection Time: 02/08/23  3:23 PM    Specimen: Urine   Result Value Ref Range    Urine culture hold        Urine on hold in Microbiology dept for 2 days. If unpreserved urine is submitted, it cannot be used for addtional testing after 24 hours, recollection will be required. RECOMMENDATIONS:  I am writting to tell you that your lab results are normal    Keep up the good work! Work on diet and exercise. Continue with current  diet and medications.        Please call me if you have any questions: [phone]    Sincerely,      Birdie Go MD

## 2023-02-08 NOTE — ACP (ADVANCE CARE PLANNING)
Advance Care Planning     Date of Conversation: 2/8/2023    Conducted with: Patient with Decision Making Capacity      Conversation Conducted with:   Patient with Decision Making Capacity, stating that the Other Legally Authorized Decision Maker would be Zhang Sebastian as SDM     Patient is on presence of no family member,, stated that the pt wants to be not DNR at this time,  The pt likes to be a full code individual,  The patient states that there is a lot of will to live,      Conversation Outcomes / Follow-Up Plan:   Completed new Advance Directive      Length of ACP Conversation in minutes:  12 minutes       Monica Odonnell MD

## 2023-02-08 NOTE — PROGRESS NOTES
This is the Subsequent Medicare Annual Wellness Exam, performed 12 months or more after the Initial AWV or the last Subsequent AWV    I have reviewed the patient's medical history in detail and updated the computerized patient record. Assessment/Plan   Education and counseling provided:  Are appropriate based on today's review and evaluation  End-of-Life planning (with patient's consent)  Pneumococcal Vaccine  Influenza Vaccine    1. Medicare annual wellness visit, subsequent  2. Advanced directives, counseling/discussion  -     ADVANCE CARE PLANNING FIRST 30 MINS  -     FULL CODE       Depression Risk Factor Screening     3 most recent PHQ Screens 2/8/2023   Little interest or pleasure in doing things Not at all   Feeling down, depressed, irritable, or hopeless Not at all   Total Score PHQ 2 0       Alcohol & Drug Abuse Risk Screen    Do you average more than 1 drink per night or more than 7 drinks a week: No    In the past three months have you have had more than 4 drinks containing alcohol on one occasion: No          Functional Ability and Level of Safety    Hearing: Hearing is good. Activities of Daily Living: The home contains: handrails, grab bars, and rugs  Patient does total self care      Ambulation: with no difficulty     Fall Risk:  Fall Risk Assessment, last 12 mths 2/8/2023   Able to walk? Yes   Fall in past 12 months? 0   Do you feel unsteady?  0   Are you worried about falling 0      Abuse Screen:  Patient is not abused       Cognitive Screening    Has your family/caregiver stated any concerns about your memory: no     Cognitive Screening: Normal - MMSE (Mini Mental Status Exam)    Health Maintenance Due     Health Maintenance Due   Topic Date Due    Pneumococcal 65+ years (1 - PCV) Never done    Shingles Vaccine (1 of 2) Never done    COVID-19 Vaccine (3 - Booster for [employer] series) 06/18/2021    Flu Vaccine (1) Never done       Patient Care Team   Patient Care Team:  Delmi Lobo MD as PCP - General (Family Medicine)  Thom Castro MD as PCP - [facility]  Mallorie Dumont MD as Physician (Cardiovascular Disease Physician)    History     Patient Active Problem List   Diagnosis Code    Coronary atherosclerosis of artery bypass graft I25.810    S/P CABG x 3 Z95.1    Chronic ischemic heart disease I25.9    Hypercholesterolemia E78.00    Urinary frequency R35.0    HTN, goal below 130/80 I10    Macrocytic anemia D53.9    Prediabetes R73.03    Hyperbilirubinemia E80.6    Elevated PSA R97.20    Back pain, chronic M54.9, G89.29    T12 compression fracture (HCC) S22.080A    Breast lump on left side at 11 o'clock position N63.22    ASHD (arteriosclerotic heart disease) I25.10    Acute bronchitis J20.9    Chronic bronchitis ([facility]) J42    H/O asbestos exposure Z77.090    Cough due to ACE inhibitor R05.8, T46.4X5A    Cat scratch of right forearm with infection S50.811A, L08.9, W55.03XA    Swelling of right hand M79.89    Chronic idiopathic constipation K59.04    Gastroesophageal reflux disease without esophagitis K21.9    Ketonuria R82.4    Chest pain at rest R07.9    SINGH (dyspnea on exertion) R06.09    Anxiety and depression F41.9, F32. 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     Past Medical History:   Diagnosis Date    Acute bronchitis 3/16/2016    Back pain, chronic 11/7/2014    Breast lump on left side at 11 o'clock position 5/7/2015    CAD (coronary artery disease)     Cat scratch of right forearm with infection 6/30/2016    Chronic bronchitis ([address] 4/5/2016    Chronic idiopathic constipation 4/17/2017    Gastroesophageal reflux disease without esophagitis 4/17/2017    H/O asbestos exposure 4/5/2016    H/O asbestos exposure 4/5/2016    Hypercholesterolemia     Hypertension     Ketonuria 4/17/2017    Need for diphtheria-tetanus-pertussis (Tdap) vaccine, adult/adolescent 4/4/2014    Need for pneumococcal vaccination 4/4/2014    Needs flu shot 4/4/2014    S/P CABG x 3     2001    Swelling of right hand 7/7/2016    T12 compression fracture ([address] 11/10/2014      Past Surgical History:   Procedure Laterality Date    HX CATARACT REMOVAL  03/2022    Dr. Ermias Dalton      CABG    [facility]    bypass    UPPER GI ENDOSCOPY,BIOPSY  10/10/2019         UPPER GI ENDOSCOPY,DILATN W GUIDE  10/10/2019          Current Outpatient Medications   Medication Sig Dispense Refill    cyanocobalamin (Vitamin B-12) 1,000 mcg tablet Take 1 Tablet by mouth daily. 30 Tablet 6    potassium chloride (KLOR-CON M10) 10 mEq tablet Take 1 Tablet by mouth daily. 30 Tablet 2    losartan-hydroCHLOROthiazide (HYZAAR) 100-25 mg per tablet TAKE 1 TABLET EVERY DAY FOR HYPERTENSION 90 Tablet 3    rosuvastatin (CRESTOR) 20 mg tablet TAKE 1 TABLET EVERY NIGHT 90 Tablet 2    calcium-cholecalciferol, D3, (CALTRATE 600+D) tablet Take 1 Tab by mouth daily. 60 Tab 11    aspirin 81 mg tablet Take 81 mg by mouth daily. omeprazole (PRILOSEC) 40 mg capsule TAKE 1 CAPSULE EVERY DAY (SUBSTITUTED FOR PRILOSEC) (Patient not taking: Reported on 2/8/2023) 90 Cap 2    isosorbide mononitrate ER (IMDUR) 30 mg tablet Take 1 Tab by mouth daily. (Patient not taking: No sig reported) 90 Tab 3    clemastine 2.68 mg tab tablet Take  by mouth two (2) times a day. (Patient not taking: No sig reported)      nitroglycerin (NITROSTAT) 0.4 mg SL tablet 1 Tab by SubLINGual route every five (5) minutes as needed for Chest Pain. Up to 3 doses. (Patient not taking: No sig reported) 30 Tab 3    COLACE 100 mg capsule Take 100 mg by mouth daily as needed.  (Patient not taking: No sig reported)      vitamins a & d cap  (Patient not taking: No sig reported)      MULTIVITAMIN (MULTIPLE VITAMIN PO) Take 1 Tablet by mouth daily. (Patient not taking: No sig reported)       Allergies   Allergen Reactions    Ace Inhibitors Cough       Family History   Problem Relation Age of Onset    Heart Disease Mother     Cancer Father      Social History     Tobacco Use    Smoking status: Never    Smokeless tobacco: Never   Substance Use Topics    Alcohol use:  Yes     Alcohol/week: 0.0 standard drinks     Comment: occasionally         Magdalene Bernheim, MD Problem Relation Age of Onset    Heart Disease Mother     Cancer Father      Social History     Tobacco Use    Smoking status: Never    Smokeless tobacco: Never   Substance Use Topics    Alcohol use:  Yes     Alcohol/week: 0.0 standard drinks     Comment: occasionally         Amanda Gould MD

## 2023-02-08 NOTE — PROGRESS NOTES
Identified pt with two pt identifiers(name and ). Reviewed record in preparation for visit and have obtained necessary documentation. Chief Complaint   Patient presents with    Annual Wellness Visit        Vitals:    23 1418   BP: 136/79   Pulse: 66   Resp: 17   Temp: 97.9 °F (36.6 °C)   TempSrc: Oral   SpO2: 96%   Weight: 175 lb (79.4 kg)   Height: 5' 7\" (1.702 m)   PainSc:   0 - No pain       Health Maintenance Due   Topic    Pneumococcal 65+ years (1 - PCV)    Shingles Vaccine (1 of 2)    COVID-19 Vaccine (3 - Booster for [employer] series)    Flu Vaccine (1)       Coordination of Care Questionnaire:  :   1) Have you been to an emergency room, urgent care, or hospitalized since your last visit? If yes, where when, and reason for visit? no       2. Have seen or consulted any other health care provider since your last visit? If yes, where when, and reason for visit? NO      Patient is accompanied by self I have received verbal consent from Linnea Main to discuss any/all medical information while they are present in the room.

## 2023-02-09 LAB
ALBUMIN SERPL-MCNC: 4.1 G/DL (ref 3.5–5)
ALBUMIN/GLOB SERPL: 1.6 (ref 1.1–2.2)
ALP SERPL-CCNC: 44 U/L (ref 45–117)
ALT SERPL-CCNC: 28 U/L (ref 12–78)
ANION GAP SERPL CALC-SCNC: 5 MMOL/L (ref 5–15)
APPEARANCE UR: CLEAR
AST SERPL-CCNC: 30 U/L (ref 15–37)
BILIRUB SERPL-MCNC: 1.6 MG/DL (ref 0.2–1)
BILIRUB UR QL: NEGATIVE
BUN SERPL-MCNC: 18 MG/DL (ref 6–20)
BUN/CREAT SERPL: 15 (ref 12–20)
CALCIUM SERPL-MCNC: 9.8 MG/DL (ref 8.5–10.1)
CHLORIDE SERPL-SCNC: 104 MMOL/L (ref 97–108)
CHOLEST SERPL-MCNC: 142 MG/DL
CO2 SERPL-SCNC: 30 MMOL/L (ref 21–32)
COLOR UR: NORMAL
CREAT SERPL-MCNC: 1.24 MG/DL (ref 0.7–1.3)
ERYTHROCYTE [DISTWIDTH] IN BLOOD BY AUTOMATED COUNT: 12.7 % (ref 11.5–14.5)
EST. AVERAGE GLUCOSE BLD GHB EST-MCNC: 114 MG/DL
GLOBULIN SER CALC-MCNC: 2.6 G/DL (ref 2–4)
GLUCOSE SERPL-MCNC: 95 MG/DL (ref 65–100)
GLUCOSE UR STRIP.AUTO-MCNC: NEGATIVE MG/DL
HBA1C MFR BLD: 5.6 % (ref 4–5.6)
HCT VFR BLD AUTO: 42.7 % (ref 36.6–50.3)
HDLC SERPL-MCNC: 73 MG/DL
HDLC SERPL: 1.9 (ref 0–5)
HGB BLD-MCNC: 13.6 G/DL (ref 12.1–17)
HGB UR QL STRIP: NEGATIVE
KETONES UR QL STRIP.AUTO: NEGATIVE MG/DL
LDLC SERPL CALC-MCNC: 58.2 MG/DL (ref 0–100)
LEUKOCYTE ESTERASE UR QL STRIP.AUTO: NEGATIVE
MCH RBC QN AUTO: 31.8 PG (ref 26–34)
MCHC RBC AUTO-ENTMCNC: 31.9 G/DL (ref 30–36.5)
MCV RBC AUTO: 99.8 FL (ref 80–99)
NITRITE UR QL STRIP.AUTO: NEGATIVE
NRBC # BLD: 0 K/UL (ref 0–0.01)
NRBC BLD-RTO: 0 PER 100 WBC
PH UR STRIP: 6.5 (ref 5–8)
PLATELET # BLD AUTO: 135 K/UL (ref 150–400)
PMV BLD AUTO: 12 FL (ref 8.9–12.9)
POTASSIUM SERPL-SCNC: 4.1 MMOL/L (ref 3.5–5.1)
PROT SERPL-MCNC: 6.7 G/DL (ref 6.4–8.2)
PROT UR STRIP-MCNC: NEGATIVE MG/DL
RBC # BLD AUTO: 4.28 M/UL (ref 4.1–5.7)
SODIUM SERPL-SCNC: 139 MMOL/L (ref 136–145)
SP GR UR REFRACTOMETRY: 1.01 (ref 1–1.03)
T4 FREE SERPL-MCNC: 1 NG/DL (ref 0.8–1.5)
TRIGL SERPL-MCNC: 54 MG/DL (ref ?–150)
TSH SERPL DL<=0.05 MIU/L-ACNC: 0.81 UIU/ML (ref 0.36–3.74)
UR CULT HOLD, URHOLD: NORMAL
UROBILINOGEN UR QL STRIP.AUTO: 0.2 EU/DL (ref 0.2–1)
VLDLC SERPL CALC-MCNC: 10.8 MG/DL
WBC # BLD AUTO: 5.2 K/UL (ref 4.1–11.1)

## 2023-06-06 NOTE — TELEPHONE ENCOUNTER
Last appointment: 2/8/23  Next appointment: none  Previous refill encounter(s): 5/12/22 #90 with 3 refills    Requested Prescriptions     Pending Prescriptions Disp Refills    losartan-hydroCHLOROthiazide (HYZAAR) 100-25 MG per tablet [Pharmacy Med Name: LOSARTAN POTASSIUM/HYDROCHLOROTHIAZIDE 100-25 MG Tablet] 30 tablet 0     Sig: TAKE 1 TABLET EVERY DAY FOR HYPERTENSION         For Pharmacy Admin Tracking Only    Program: Medication Refill  CPA in place:    Recommendation Provided To:    Intervention Detail: New Rx: 1, reason: Patient Preference  Intervention Accepted By:   Silvana Herr Closed?:    Time Spent (min): 5

## 2023-06-07 RX ORDER — LOSARTAN POTASSIUM AND HYDROCHLOROTHIAZIDE 25; 100 MG/1; MG/1
TABLET ORAL
Qty: 30 TABLET | Refills: 0 | Status: SHIPPED | OUTPATIENT
Start: 2023-06-07

## 2023-07-07 RX ORDER — LOSARTAN POTASSIUM AND HYDROCHLOROTHIAZIDE 25; 100 MG/1; MG/1
TABLET ORAL
Qty: 90 TABLET | Refills: 0 | Status: SHIPPED | OUTPATIENT
Start: 2023-07-07

## 2024-01-16 ENCOUNTER — TELEMEDICINE (OUTPATIENT)
Age: 84
End: 2024-01-16
Payer: COMMERCIAL

## 2024-01-16 ENCOUNTER — TELEPHONE (OUTPATIENT)
Age: 84
End: 2024-01-16

## 2024-01-16 DIAGNOSIS — J20.9 ACUTE BRONCHITIS, UNSPECIFIED ORGANISM: Primary | ICD-10-CM

## 2024-01-16 DIAGNOSIS — D69.6 THROMBOCYTOPENIA, UNSPECIFIED (HCC): ICD-10-CM

## 2024-01-16 DIAGNOSIS — J41.8 MIXED SIMPLE AND MUCOPURULENT CHRONIC BRONCHITIS (HCC): ICD-10-CM

## 2024-01-16 DIAGNOSIS — G63 POLYNEUROPATHY IN DISEASES CLASSIFIED ELSEWHERE (HCC): ICD-10-CM

## 2024-01-16 PROCEDURE — 1123F ACP DISCUSS/DSCN MKR DOCD: CPT | Performed by: FAMILY MEDICINE

## 2024-01-16 PROCEDURE — 99213 OFFICE O/P EST LOW 20 MIN: CPT | Performed by: FAMILY MEDICINE

## 2024-01-16 RX ORDER — AZITHROMYCIN 250 MG/1
250 TABLET, FILM COATED ORAL SEE ADMIN INSTRUCTIONS
Qty: 6 TABLET | Refills: 0 | Status: SHIPPED | OUTPATIENT
Start: 2024-01-16 | End: 2024-01-21

## 2024-01-16 RX ORDER — BENZONATATE 200 MG/1
200 CAPSULE ORAL 2 TIMES DAILY PRN
Qty: 14 CAPSULE | Refills: 0 | Status: SHIPPED | OUTPATIENT
Start: 2024-01-16 | End: 2024-01-23

## 2024-01-16 ASSESSMENT — PATIENT HEALTH QUESTIONNAIRE - PHQ9
10. IF YOU CHECKED OFF ANY PROBLEMS, HOW DIFFICULT HAVE THESE PROBLEMS MADE IT FOR YOU TO DO YOUR WORK, TAKE CARE OF THINGS AT HOME, OR GET ALONG WITH OTHER PEOPLE: 0
7. TROUBLE CONCENTRATING ON THINGS, SUCH AS READING THE NEWSPAPER OR WATCHING TELEVISION: 0
5. POOR APPETITE OR OVEREATING: 0
SUM OF ALL RESPONSES TO PHQ QUESTIONS 1-9: 0
1. LITTLE INTEREST OR PLEASURE IN DOING THINGS: 0
6. FEELING BAD ABOUT YOURSELF - OR THAT YOU ARE A FAILURE OR HAVE LET YOURSELF OR YOUR FAMILY DOWN: 0
3. TROUBLE FALLING OR STAYING ASLEEP: 0
SUM OF ALL RESPONSES TO PHQ QUESTIONS 1-9: 0
9. THOUGHTS THAT YOU WOULD BE BETTER OFF DEAD, OR OF HURTING YOURSELF: 0
8. MOVING OR SPEAKING SO SLOWLY THAT OTHER PEOPLE COULD HAVE NOTICED. OR THE OPPOSITE, BEING SO FIGETY OR RESTLESS THAT YOU HAVE BEEN MOVING AROUND A LOT MORE THAN USUAL: 0
SUM OF ALL RESPONSES TO PHQ QUESTIONS 1-9: 0
4. FEELING TIRED OR HAVING LITTLE ENERGY: 0
2. FEELING DOWN, DEPRESSED OR HOPELESS: 0
SUM OF ALL RESPONSES TO PHQ QUESTIONS 1-9: 0
SUM OF ALL RESPONSES TO PHQ9 QUESTIONS 1 & 2: 0

## 2024-01-16 NOTE — PROGRESS NOTES
Chief Complaint   Patient presents with    Cough     X 2-3 weeks        \"Have you been to the ER, urgent care clinic since your last visit?  Hospitalized since your last visit?\"    NO    “Have you seen or consulted any other health care providers outside of [facility] since your last visit?”    NO         Health Maintenance Due   Topic Date Due    Pneumococcal 65+ years Vaccine (1 - PCV) Never done    Shingles vaccine (1 of 2) Never done    Respiratory Syncytial Virus (RSV) Pregnant or age 60 yrs+ (1 - 1-dose 60+ series) Never done    Flu vaccine (1) Never done    COVID-19 Vaccine (3 - 2023-24 season) 2023    Lipids  2024    Depression Monitoring  2024        The patient, Swapnil Duncan Sr, identity was verified by name and

## 2024-01-16 NOTE — PROGRESS NOTES
Swapnil Duncan Sr, was evaluated through a synchronous (real-time) audio-video encounter. The patient (or guardian if applicable) is aware that this is a billable service, which includes applicable co-pays. This Virtual Visit was conducted with patient's (and/or legal guardian's) consent. Patient identification was verified, and a caregiver was present when appropriate.   The patient was located at Home: [address] [place] [id number]  Provider was located at Facility (Appt Dept): [address] [place] [id number]      Swapnil Duncan Sr (:  1940) is a Established patient, presenting virtually for evaluation of the following:  Today's COVID-19 unvaccinated pt main concerns were provided on virtual visit and Video telemed format,  Present on VV for the concern of the current medical conditions,  pt is w/ comorbid history and  the pt does not know if has been exposed to covid-19 individual, and has been tested with neg result, currently not working  patient currently have hoarseness having cough mostly dry, does not have shortness of breath and patient is not feeling lightheadedness and Dz,   pt has no low grade fever,  nl smell abnl taste, patient also not complaining of some nausea feeling , no vomiting diarrhea, no body ache , and no orbital pain, no rash, patient also states of having a good family support at this time        Constitutional: no chills and fever,  , nad     HENT: no ear pain or nosebleeds. No blurred vision  Respiratory: ++ shortness of breath, ++wheezing +++cough   Cardiovascular: Has no chest pain, ,and racing heart .   Gastrointestinal: No constipation, diarrhea, nausea and vomiting.   Genitourinary: No frequency.   Musculoskeletal: Negative for joint pain.   Skin: no itching, no rash.   Neurological: Negative for dizziness, no tremors  Psychiatric/Behavioral: Negative for depression   is not nervous/anxious.   and not depressed the patient is not

## 2024-01-16 NOTE — TELEPHONE ENCOUNTER
Appt scheduled for 1/16/24@777v  
Patient would like for  to send something into the pharmacy for cough he can be reached @ [phone]  
None

## 2024-02-09 ENCOUNTER — OFFICE VISIT (OUTPATIENT)
Age: 84
End: 2024-02-09
Payer: COMMERCIAL

## 2024-02-09 VITALS
DIASTOLIC BLOOD PRESSURE: 64 MMHG | SYSTOLIC BLOOD PRESSURE: 135 MMHG | BODY MASS INDEX: 27.62 KG/M2 | WEIGHT: 176 LBS | HEIGHT: 67 IN | RESPIRATION RATE: 18 BRPM | HEART RATE: 63 BPM | TEMPERATURE: 97.7 F

## 2024-02-09 DIAGNOSIS — I25.10 ASHD (ARTERIOSCLEROTIC HEART DISEASE): ICD-10-CM

## 2024-02-09 DIAGNOSIS — N18.31 STAGE 3A CHRONIC KIDNEY DISEASE (HCC): ICD-10-CM

## 2024-02-09 DIAGNOSIS — Z00.00 MEDICARE ANNUAL WELLNESS VISIT, SUBSEQUENT: Primary | ICD-10-CM

## 2024-02-09 DIAGNOSIS — J41.8 MIXED SIMPLE AND MUCOPURULENT CHRONIC BRONCHITIS (HCC): ICD-10-CM

## 2024-02-09 DIAGNOSIS — I25.110 CORONARY ARTERY DISEASE INVOLVING NATIVE HEART WITH UNSTABLE ANGINA PECTORIS, UNSPECIFIED VESSEL OR LESION TYPE (HCC): ICD-10-CM

## 2024-02-09 DIAGNOSIS — I10 HTN, GOAL BELOW 130/80: ICD-10-CM

## 2024-02-09 LAB
ALBUMIN SERPL-MCNC: 4 G/DL (ref 3.5–5)
ALBUMIN/GLOB SERPL: 1.3 (ref 1.1–2.2)
ALP SERPL-CCNC: 49 U/L (ref 45–117)
ALT SERPL-CCNC: 29 U/L (ref 12–78)
ANION GAP SERPL CALC-SCNC: 5 MMOL/L (ref 5–15)
APPEARANCE UR: CLEAR
AST SERPL-CCNC: 25 U/L (ref 15–37)
BILIRUB SERPL-MCNC: 1.5 MG/DL (ref 0.2–1)
BILIRUB UR QL: NEGATIVE
BUN SERPL-MCNC: 18 MG/DL (ref 6–20)
BUN/CREAT SERPL: 15 (ref 12–20)
CALCIUM SERPL-MCNC: 9.6 MG/DL (ref 8.5–10.1)
CHLORIDE SERPL-SCNC: 107 MMOL/L (ref 97–108)
CHOLEST SERPL-MCNC: 128 MG/DL
CO2 SERPL-SCNC: 30 MMOL/L (ref 21–32)
COLOR UR: ABNORMAL
CREAT SERPL-MCNC: 1.19 MG/DL (ref 0.7–1.3)
ERYTHROCYTE [DISTWIDTH] IN BLOOD BY AUTOMATED COUNT: 12.7 % (ref 11.5–14.5)
GLOBULIN SER CALC-MCNC: 3 G/DL (ref 2–4)
GLUCOSE SERPL-MCNC: 93 MG/DL (ref 65–100)
GLUCOSE UR STRIP.AUTO-MCNC: NEGATIVE MG/DL
HCT VFR BLD AUTO: 41 % (ref 36.6–50.3)
HDLC SERPL-MCNC: 71 MG/DL
HDLC SERPL: 1.8 (ref 0–5)
HGB BLD-MCNC: 14.1 G/DL (ref 12.1–17)
HGB UR QL STRIP: NEGATIVE
KETONES UR QL STRIP.AUTO: ABNORMAL MG/DL
LDLC SERPL CALC-MCNC: 47.2 MG/DL (ref 0–100)
LEUKOCYTE ESTERASE UR QL STRIP.AUTO: NEGATIVE
MCH RBC QN AUTO: 33.3 PG (ref 26–34)
MCHC RBC AUTO-ENTMCNC: 34.4 G/DL (ref 30–36.5)
MCV RBC AUTO: 96.9 FL (ref 80–99)
NITRITE UR QL STRIP.AUTO: NEGATIVE
NRBC # BLD: 0 K/UL (ref 0–0.01)
NRBC BLD-RTO: 0 PER 100 WBC
PH UR STRIP: 6 (ref 5–8)
PLATELET # BLD AUTO: 114 K/UL (ref 150–400)
PMV BLD AUTO: 12.8 FL (ref 8.9–12.9)
POTASSIUM SERPL-SCNC: 3.9 MMOL/L (ref 3.5–5.1)
PROT SERPL-MCNC: 7 G/DL (ref 6.4–8.2)
PROT UR STRIP-MCNC: NEGATIVE MG/DL
RBC # BLD AUTO: 4.23 M/UL (ref 4.1–5.7)
SODIUM SERPL-SCNC: 142 MMOL/L (ref 136–145)
SP GR UR REFRACTOMETRY: 1.02 (ref 1–1.03)
TRIGL SERPL-MCNC: 49 MG/DL
TSH SERPL DL<=0.05 MIU/L-ACNC: 0.96 UIU/ML (ref 0.36–3.74)
UROBILINOGEN UR QL STRIP.AUTO: 0.2 EU/DL (ref 0.2–1)
VLDLC SERPL CALC-MCNC: 9.8 MG/DL
WBC # BLD AUTO: 5.7 K/UL (ref 4.1–11.1)

## 2024-02-09 PROCEDURE — 3078F DIAST BP <80 MM HG: CPT | Performed by: FAMILY MEDICINE

## 2024-02-09 PROCEDURE — 1123F ACP DISCUSS/DSCN MKR DOCD: CPT | Performed by: FAMILY MEDICINE

## 2024-02-09 PROCEDURE — 3075F SYST BP GE 130 - 139MM HG: CPT | Performed by: FAMILY MEDICINE

## 2024-02-09 PROCEDURE — G0439 PPPS, SUBSEQ VISIT: HCPCS | Performed by: FAMILY MEDICINE

## 2024-02-09 RX ORDER — ISOSORBIDE MONONITRATE 30 MG/1
30 TABLET, EXTENDED RELEASE ORAL DAILY
Qty: 90 TABLET | Refills: 3 | Status: SHIPPED | OUTPATIENT
Start: 2024-02-09

## 2024-02-09 RX ORDER — ROSUVASTATIN CALCIUM 20 MG/1
20 TABLET, COATED ORAL NIGHTLY
Qty: 90 TABLET | Refills: 3 | Status: SHIPPED | OUTPATIENT
Start: 2024-02-09

## 2024-02-09 RX ORDER — LOSARTAN POTASSIUM AND HYDROCHLOROTHIAZIDE 25; 100 MG/1; MG/1
TABLET ORAL
Qty: 90 TABLET | Refills: 3 | Status: SHIPPED | OUTPATIENT
Start: 2024-02-09

## 2024-02-09 NOTE — PROGRESS NOTES
Medicare Annual Wellness Visit    Swapnil Duncan Sr is here for Medicare AWV    Assessment & Plan   Medicare annual wellness visit, subsequent  HTN, goal below 130/80  -     CBC; Future  -     Hemoglobin A1C; Future  -     TSH; Future  -     Lipid Panel; Future  -     Urinalysis; Future  -     Comprehensive Metabolic Panel; Future  Coronary artery disease involving native heart with unstable angina pectoris, unspecified vessel or lesion type (HCC)  -     CBC; Future  -     Hemoglobin A1C; Future  -     TSH; Future  -     Lipid Panel; Future  -     Urinalysis; Future  -     Comprehensive Metabolic Panel; Future  ASHD (arteriosclerotic heart disease)  -     CBC; Future  -     Hemoglobin A1C; Future  -     TSH; Future  -     Lipid Panel; Future  -     Urinalysis; Future  -     Comprehensive Metabolic Panel; Future  Mixed simple and mucopurulent chronic bronchitis (HCC)  -     CBC; Future  -     Hemoglobin A1C; Future  -     TSH; Future  -     Lipid Panel; Future  -     Urinalysis; Future  -     Comprehensive Metabolic Panel; Future  Stage 3a chronic kidney disease (HCC)    Recommendations for Preventive Services Due: see orders and patient instructions/AVS.  Recommended screening schedule for the next 5-10 years is provided to the patient in written form: see Patient Instructions/AVS.     Return in 6 months (on 8/9/2024).     Subjective         Patient's complete Health Risk Assessment and screening values have been reviewed and are found in Flowsheets. The following problems were reviewed today and where indicated follow up appointments were made and/or referrals ordered.    No Positive Risk Factors identified today.                   Dentist Screen:  Have you seen the dentist within the past year?: (!) No    Intervention:  Advised to schedule with their dentist                Objective   Vitals:    02/09/24 0804   BP: 135/64   Pulse: 63   Resp: 18   Temp: 97.7 °F (36.5 °C)   TempSrc: Temporal   Weight: 79.8 kg (176

## 2024-02-09 NOTE — PATIENT INSTRUCTIONS
women. Too much alcohol can cause health problems.     Manage other health problems such as diabetes, high blood pressure, and high cholesterol. If you think you may have a problem with alcohol or drug use, talk to your doctor.   Medicines    Take your medicines exactly as prescribed. Call your doctor if you think you are having a problem with your medicine.     If your doctor recommends aspirin, take the amount directed each day. Make sure you take aspirin and not another kind of pain reliever, such as acetaminophen (Tylenol).   When should you call for help?   Call 911 if you have symptoms of a heart attack. These may include:    Chest pain or pressure, or a strange feeling in the chest.     Sweating.     Shortness of breath.     Pain, pressure, or a strange feeling in the back, neck, jaw, or upper belly or in one or both shoulders or arms.     Lightheadedness or sudden weakness.     A fast or irregular heartbeat.   After you call 911, the  may tell you to chew 1 adult-strength or 2 to 4 low-dose aspirin. Wait for an ambulance. Do not try to drive yourself.  Watch closely for changes in your health, and be sure to contact your doctor if you have any problems.  Where can you learn more?  Go to https://[email or web address].com.net/patientEd and enter F075 to learn more about \"A Healthy Heart: Care Instructions.\"  Current as of: June 25, 2023               Content Version: 13.9  © 2067-1542 [employer].   Care instructions adapted under license by [employer]. If you have questions about a medical condition or this instruction, always ask your healthcare professional. [employer] disclaims any warranty or liability for your use of this information.      Personalized Preventive Plan for Swapnil Duncan Sr - 2/9/2024  Medicare offers a range of preventive health benefits. Some of the tests and screenings are paid in full while other may be subject to a deductible, co-insurance, and/or

## 2024-02-09 NOTE — PROGRESS NOTES
Chief Complaint   Patient presents with    Medicare AWV       \"Have you been to the ER, urgent care clinic since your last visit?  Hospitalized since your last visit?\"    NO    “Have you seen or consulted any other health care providers outside of [facility] since your last visit?”    NO            Vitals:    24 0804   BP: 135/64   Pulse: 63   Resp: 18   Temp: 97.7 °F (36.5 °C)   TempSrc: Temporal   Weight: 79.8 kg (176 lb)   Height: 1.702 m (5' 7\")        Health Maintenance Due   Topic Date Due    Lipids  2024        The patient, Swapnil Duncan Sr, identity was verified by name and

## 2024-02-10 LAB
EST. AVERAGE GLUCOSE BLD GHB EST-MCNC: 111 MG/DL
HBA1C MFR BLD: 5.5 % (ref 4–5.6)

## 2024-10-08 ENCOUNTER — HOSPITAL ENCOUNTER (INPATIENT)
Facility: HOSPITAL | Age: 84
LOS: 2 days | Discharge: HOME | DRG: 311 | End: 2024-10-10
Attending: INTERNAL MEDICINE | Admitting: STUDENT IN AN ORGANIZED HEALTH CARE EDUCATION/TRAINING PROGRAM
Payer: MEDICARE

## 2024-10-08 DIAGNOSIS — R06.02 SHORTNESS OF BREATH: Primary | ICD-10-CM

## 2024-10-08 PROBLEM — I20.0 UNSTABLE ANGINA (HCC): Status: ACTIVE | Noted: 2024-10-08

## 2024-10-08 LAB
ALBUMIN SERPL-MCNC: 3.3 G/DL (ref 3.5–5)
ALBUMIN/GLOB SERPL: 1.1 (ref 1.1–2.2)
ALP SERPL-CCNC: 42 U/L (ref 45–117)
ALT SERPL-CCNC: 17 U/L (ref 12–78)
ANION GAP SERPL CALC-SCNC: 5 MMOL/L (ref 2–12)
AST SERPL-CCNC: 20 U/L (ref 15–37)
BASOPHILS # BLD: 0 K/UL (ref 0–0.1)
BASOPHILS NFR BLD: 1 % (ref 0–1)
BILIRUB SERPL-MCNC: 1 MG/DL (ref 0.2–1)
BUN SERPL-MCNC: 20 MG/DL (ref 6–20)
BUN/CREAT SERPL: 14 (ref 12–20)
CALCIUM SERPL-MCNC: 9.2 MG/DL (ref 8.5–10.1)
CHLORIDE SERPL-SCNC: 108 MMOL/L (ref 97–108)
CO2 SERPL-SCNC: 27 MMOL/L (ref 21–32)
CREAT SERPL-MCNC: 1.44 MG/DL (ref 0.7–1.3)
DIFFERENTIAL METHOD BLD: ABNORMAL
EOSINOPHIL # BLD: 0.1 K/UL (ref 0–0.4)
EOSINOPHIL NFR BLD: 2 % (ref 0–7)
ERYTHROCYTE [DISTWIDTH] IN BLOOD BY AUTOMATED COUNT: 12.3 % (ref 11.5–14.5)
GLOBULIN SER CALC-MCNC: 2.9 G/DL (ref 2–4)
GLUCOSE SERPL-MCNC: 174 MG/DL (ref 65–100)
HCT VFR BLD AUTO: 31.8 % (ref 36.6–50.3)
HGB BLD-MCNC: 10.7 G/DL (ref 12.1–17)
IMM GRANULOCYTES # BLD AUTO: 0 K/UL (ref 0–0.04)
IMM GRANULOCYTES NFR BLD AUTO: 0 % (ref 0–0.5)
LYMPHOCYTES # BLD: 1.5 K/UL (ref 0.8–3.5)
LYMPHOCYTES NFR BLD: 25 % (ref 12–49)
MAGNESIUM SERPL-MCNC: 1.8 MG/DL (ref 1.6–2.4)
MCH RBC QN AUTO: 32.9 PG (ref 26–34)
MCHC RBC AUTO-ENTMCNC: 33.6 G/DL (ref 30–36.5)
MCV RBC AUTO: 97.8 FL (ref 80–99)
MONOCYTES # BLD: 0.5 K/UL (ref 0–1)
MONOCYTES NFR BLD: 9 % (ref 5–13)
NEUTS SEG # BLD: 3.9 K/UL (ref 1.8–8)
NEUTS SEG NFR BLD: 64 % (ref 32–75)
NRBC # BLD: 0 K/UL (ref 0–0.01)
NRBC BLD-RTO: 0 PER 100 WBC
NT PRO BNP: 236 PG/ML
PLATELET # BLD AUTO: 137 K/UL (ref 150–400)
PMV BLD AUTO: 11.8 FL (ref 8.9–12.9)
POTASSIUM SERPL-SCNC: 3.9 MMOL/L (ref 3.5–5.1)
PROT SERPL-MCNC: 6.2 G/DL (ref 6.4–8.2)
RBC # BLD AUTO: 3.25 M/UL (ref 4.1–5.7)
SODIUM SERPL-SCNC: 140 MMOL/L (ref 136–145)
TROPONIN I SERPL HS-MCNC: 23 NG/L (ref 0–76)
WBC # BLD AUTO: 6.1 K/UL (ref 4.1–11.1)

## 2024-10-08 PROCEDURE — 85025 COMPLETE CBC W/AUTO DIFF WBC: CPT

## 2024-10-08 PROCEDURE — 36415 COLL VENOUS BLD VENIPUNCTURE: CPT

## 2024-10-08 PROCEDURE — 1100000003 HC PRIVATE W/ TELEMETRY

## 2024-10-08 PROCEDURE — 93005 ELECTROCARDIOGRAM TRACING: CPT | Performed by: STUDENT IN AN ORGANIZED HEALTH CARE EDUCATION/TRAINING PROGRAM

## 2024-10-08 PROCEDURE — 84484 ASSAY OF TROPONIN QUANT: CPT

## 2024-10-08 PROCEDURE — 2580000003 HC RX 258: Performed by: STUDENT IN AN ORGANIZED HEALTH CARE EDUCATION/TRAINING PROGRAM

## 2024-10-08 PROCEDURE — 80053 COMPREHEN METABOLIC PANEL: CPT

## 2024-10-08 PROCEDURE — 83735 ASSAY OF MAGNESIUM: CPT

## 2024-10-08 PROCEDURE — 6370000000 HC RX 637 (ALT 250 FOR IP): Performed by: STUDENT IN AN ORGANIZED HEALTH CARE EDUCATION/TRAINING PROGRAM

## 2024-10-08 PROCEDURE — 2060000000 HC ICU INTERMEDIATE R&B

## 2024-10-08 PROCEDURE — 83880 ASSAY OF NATRIURETIC PEPTIDE: CPT

## 2024-10-08 RX ORDER — ROSUVASTATIN CALCIUM 20 MG/1
20 TABLET, COATED ORAL NIGHTLY
Status: DISCONTINUED | OUTPATIENT
Start: 2024-10-08 | End: 2024-10-10 | Stop reason: HOSPADM

## 2024-10-08 RX ORDER — LOSARTAN POTASSIUM AND HYDROCHLOROTHIAZIDE 25; 100 MG/1; MG/1
1 TABLET ORAL DAILY
Status: DISCONTINUED | OUTPATIENT
Start: 2024-10-08 | End: 2024-10-08

## 2024-10-08 RX ORDER — LANOLIN ALCOHOL/MO/W.PET/CERES
3 CREAM (GRAM) TOPICAL NIGHTLY
Status: DISCONTINUED | OUTPATIENT
Start: 2024-10-08 | End: 2024-10-10 | Stop reason: HOSPADM

## 2024-10-08 RX ORDER — SODIUM CHLORIDE 9 MG/ML
INJECTION, SOLUTION INTRAVENOUS PRN
Status: DISCONTINUED | OUTPATIENT
Start: 2024-10-08 | End: 2024-10-10 | Stop reason: HOSPADM

## 2024-10-08 RX ORDER — POLYETHYLENE GLYCOL 3350 17 G/17G
17 POWDER, FOR SOLUTION ORAL DAILY PRN
Status: DISCONTINUED | OUTPATIENT
Start: 2024-10-08 | End: 2024-10-10 | Stop reason: HOSPADM

## 2024-10-08 RX ORDER — ASPIRIN 81 MG/1
81 TABLET, CHEWABLE ORAL DAILY
COMMUNITY

## 2024-10-08 RX ORDER — PANTOPRAZOLE SODIUM 40 MG/1
40 TABLET, DELAYED RELEASE ORAL
Status: DISCONTINUED | OUTPATIENT
Start: 2024-10-09 | End: 2024-10-10 | Stop reason: HOSPADM

## 2024-10-08 RX ORDER — ONDANSETRON 4 MG/1
4 TABLET, ORALLY DISINTEGRATING ORAL EVERY 8 HOURS PRN
Status: DISCONTINUED | OUTPATIENT
Start: 2024-10-08 | End: 2024-10-10 | Stop reason: HOSPADM

## 2024-10-08 RX ORDER — ONDANSETRON 2 MG/ML
4 INJECTION INTRAMUSCULAR; INTRAVENOUS EVERY 6 HOURS PRN
Status: DISCONTINUED | OUTPATIENT
Start: 2024-10-08 | End: 2024-10-10 | Stop reason: HOSPADM

## 2024-10-08 RX ORDER — ASPIRIN 81 MG/1
81 TABLET, CHEWABLE ORAL DAILY
Status: DISCONTINUED | OUTPATIENT
Start: 2024-10-09 | End: 2024-10-10 | Stop reason: HOSPADM

## 2024-10-08 RX ORDER — SODIUM CHLORIDE 0.9 % (FLUSH) 0.9 %
5-40 SYRINGE (ML) INJECTION EVERY 12 HOURS SCHEDULED
Status: DISCONTINUED | OUTPATIENT
Start: 2024-10-08 | End: 2024-10-10 | Stop reason: HOSPADM

## 2024-10-08 RX ORDER — ASPIRIN 325 MG
325 TABLET, DELAYED RELEASE (ENTERIC COATED) ORAL ONCE
Status: COMPLETED | OUTPATIENT
Start: 2024-10-08 | End: 2024-10-08

## 2024-10-08 RX ORDER — ACETAMINOPHEN 650 MG/1
650 SUPPOSITORY RECTAL EVERY 6 HOURS PRN
Status: DISCONTINUED | OUTPATIENT
Start: 2024-10-08 | End: 2024-10-10 | Stop reason: HOSPADM

## 2024-10-08 RX ORDER — HYDROCHLOROTHIAZIDE 25 MG/1
25 TABLET ORAL DAILY
Status: DISCONTINUED | OUTPATIENT
Start: 2024-10-08 | End: 2024-10-09

## 2024-10-08 RX ORDER — LOSARTAN POTASSIUM 100 MG/1
100 TABLET ORAL DAILY
Status: DISCONTINUED | OUTPATIENT
Start: 2024-10-08 | End: 2024-10-09

## 2024-10-08 RX ORDER — ENOXAPARIN SODIUM 100 MG/ML
40 INJECTION SUBCUTANEOUS DAILY
Status: DISCONTINUED | OUTPATIENT
Start: 2024-10-09 | End: 2024-10-10 | Stop reason: HOSPADM

## 2024-10-08 RX ORDER — ACETAMINOPHEN 325 MG/1
650 TABLET ORAL EVERY 6 HOURS PRN
Status: DISCONTINUED | OUTPATIENT
Start: 2024-10-08 | End: 2024-10-10 | Stop reason: HOSPADM

## 2024-10-08 RX ORDER — SODIUM CHLORIDE 0.9 % (FLUSH) 0.9 %
5-40 SYRINGE (ML) INJECTION PRN
Status: DISCONTINUED | OUTPATIENT
Start: 2024-10-08 | End: 2024-10-10 | Stop reason: HOSPADM

## 2024-10-08 RX ADMIN — ASPIRIN 325 MG: 325 TABLET, COATED ORAL at 20:53

## 2024-10-08 RX ADMIN — ROSUVASTATIN CALCIUM 20 MG: 20 TABLET, FILM COATED ORAL at 20:53

## 2024-10-08 RX ADMIN — SODIUM CHLORIDE, PRESERVATIVE FREE 10 ML: 5 INJECTION INTRAVENOUS at 20:53

## 2024-10-08 NOTE — PROGRESS NOTES
Bedside and Verbal shift change report given to SHANTELL Thrasher (oncoming nurse) by Shantell Ellington (offgoing nurse). Report included the following information Nurse Handoff Report, Index, Adult Overview, Intake/Output, MAR, Recent Results, and Cardiac Rhythm NSR .     End of Shift Note    Bedside shift change report given to SHANTELL Alvares (oncoming nurse) by Anna Marie Rubio RN (offgoing nurse).  Report included the following information SBAR, Kardex, Intake/Output, MAR, Recent Results, and Cardiac Rhythm NSR    Shift worked:  7p-7a     Shift summary and any significant changes:          Concerns for physician to address:       Zone phone for oncoming shift:          Anna Marie Rubio RN

## 2024-10-08 NOTE — PROGRESS NOTES
1847: Pt arrived to unit, VS assessed. Pt at 97% on room air reporting no shortness of breath at this time.

## 2024-10-09 ENCOUNTER — APPOINTMENT (OUTPATIENT)
Facility: HOSPITAL | Age: 84
DRG: 311 | End: 2024-10-09
Attending: INTERNAL MEDICINE
Payer: MEDICARE

## 2024-10-09 LAB
ANION GAP SERPL CALC-SCNC: 5 MMOL/L (ref 2–12)
BASOPHILS # BLD: 0 K/UL (ref 0–0.1)
BASOPHILS NFR BLD: 1 % (ref 0–1)
BUN SERPL-MCNC: 20 MG/DL (ref 6–20)
BUN/CREAT SERPL: 14 (ref 12–20)
CALCIUM SERPL-MCNC: 8.9 MG/DL (ref 8.5–10.1)
CHLORIDE SERPL-SCNC: 110 MMOL/L (ref 97–108)
CO2 SERPL-SCNC: 27 MMOL/L (ref 21–32)
CREAT SERPL-MCNC: 1.46 MG/DL (ref 0.7–1.3)
DIFFERENTIAL METHOD BLD: ABNORMAL
ECHO BSA: 1.95 M2
EKG ATRIAL RATE: 74 BPM
EKG DIAGNOSIS: NORMAL
EKG DIAGNOSIS: NORMAL
EKG P AXIS: 48 DEGREES
EKG P-R INTERVAL: 146 MS
EKG Q-T INTERVAL: 420 MS
EKG QRS DURATION: 90 MS
EKG QTC CALCULATION (BAZETT): 466 MS
EKG R AXIS: 51 DEGREES
EKG T AXIS: 87 DEGREES
EKG VENTRICULAR RATE: 74 BPM
EOSINOPHIL # BLD: 0.2 K/UL (ref 0–0.4)
EOSINOPHIL NFR BLD: 3 % (ref 0–7)
ERYTHROCYTE [DISTWIDTH] IN BLOOD BY AUTOMATED COUNT: 12.6 % (ref 11.5–14.5)
GLUCOSE SERPL-MCNC: 122 MG/DL (ref 65–100)
HCT VFR BLD AUTO: 30.6 % (ref 36.6–50.3)
HGB BLD-MCNC: 10.3 G/DL (ref 12.1–17)
IMM GRANULOCYTES # BLD AUTO: 0 K/UL (ref 0–0.04)
IMM GRANULOCYTES NFR BLD AUTO: 0 % (ref 0–0.5)
LYMPHOCYTES # BLD: 1.8 K/UL (ref 0.8–3.5)
LYMPHOCYTES NFR BLD: 27 % (ref 12–49)
MCH RBC QN AUTO: 32.9 PG (ref 26–34)
MCHC RBC AUTO-ENTMCNC: 33.7 G/DL (ref 30–36.5)
MCV RBC AUTO: 97.8 FL (ref 80–99)
MONOCYTES # BLD: 0.9 K/UL (ref 0–1)
MONOCYTES NFR BLD: 13 % (ref 5–13)
NEUTS SEG # BLD: 3.7 K/UL (ref 1.8–8)
NEUTS SEG NFR BLD: 56 % (ref 32–75)
NRBC # BLD: 0 K/UL (ref 0–0.01)
NRBC BLD-RTO: 0 PER 100 WBC
PLATELET # BLD AUTO: 138 K/UL (ref 150–400)
PMV BLD AUTO: 11.4 FL (ref 8.9–12.9)
POTASSIUM SERPL-SCNC: 3.7 MMOL/L (ref 3.5–5.1)
RBC # BLD AUTO: 3.13 M/UL (ref 4.1–5.7)
SODIUM SERPL-SCNC: 142 MMOL/L (ref 136–145)
STRESS BASELINE DIAS BP: 57 MMHG
STRESS BASELINE HR: 64 BPM
STRESS BASELINE SYS BP: 143 MMHG
STRESS ESTIMATED WORKLOAD: 7 METS
STRESS EXERCISE DUR MIN: 4 MIN
STRESS EXERCISE DUR SEC: 31 SEC
STRESS O2 SAT PEAK: 97 %
STRESS O2 SAT REST: 95 %
STRESS PEAK DIAS BP: 61 MMHG
STRESS PEAK SYS BP: 171 MMHG
STRESS PERCENT HR ACHIEVED: 99 %
STRESS POST PEAK HR: 134 BPM
STRESS RATE PRESSURE PRODUCT: NORMAL BPM*MMHG
STRESS STAGE 1 BP: NORMAL MMHG
STRESS STAGE 1 DURATION: 3 MIN:SEC
STRESS STAGE 1 HR: 125 BPM
STRESS STAGE 2 DURATION: NORMAL MIN:SEC
STRESS STAGE 2 HR: 134 BPM
STRESS TARGET HR: 135 BPM
TROPONIN I SERPL HS-MCNC: 22 NG/L (ref 0–76)
WBC # BLD AUTO: 6.6 K/UL (ref 4.1–11.1)

## 2024-10-09 PROCEDURE — 93017 CV STRESS TEST TRACING ONLY: CPT

## 2024-10-09 PROCEDURE — 3430000000 HC RX DIAGNOSTIC RADIOPHARMACEUTICAL: Performed by: STUDENT IN AN ORGANIZED HEALTH CARE EDUCATION/TRAINING PROGRAM

## 2024-10-09 PROCEDURE — 85025 COMPLETE CBC W/AUTO DIFF WBC: CPT

## 2024-10-09 PROCEDURE — 1100000003 HC PRIVATE W/ TELEMETRY

## 2024-10-09 PROCEDURE — 6370000000 HC RX 637 (ALT 250 FOR IP): Performed by: STUDENT IN AN ORGANIZED HEALTH CARE EDUCATION/TRAINING PROGRAM

## 2024-10-09 PROCEDURE — 71045 X-RAY EXAM CHEST 1 VIEW: CPT

## 2024-10-09 PROCEDURE — 2580000003 HC RX 258: Performed by: STUDENT IN AN ORGANIZED HEALTH CARE EDUCATION/TRAINING PROGRAM

## 2024-10-09 PROCEDURE — A9500 TC99M SESTAMIBI: HCPCS | Performed by: STUDENT IN AN ORGANIZED HEALTH CARE EDUCATION/TRAINING PROGRAM

## 2024-10-09 PROCEDURE — 97165 OT EVAL LOW COMPLEX 30 MIN: CPT | Performed by: OCCUPATIONAL THERAPIST

## 2024-10-09 PROCEDURE — 97161 PT EVAL LOW COMPLEX 20 MIN: CPT

## 2024-10-09 PROCEDURE — 97116 GAIT TRAINING THERAPY: CPT

## 2024-10-09 PROCEDURE — 78452 HT MUSCLE IMAGE SPECT MULT: CPT

## 2024-10-09 PROCEDURE — 80048 BASIC METABOLIC PNL TOTAL CA: CPT

## 2024-10-09 PROCEDURE — 84484 ASSAY OF TROPONIN QUANT: CPT

## 2024-10-09 PROCEDURE — 36415 COLL VENOUS BLD VENIPUNCTURE: CPT

## 2024-10-09 RX ORDER — TETRAKIS(2-METHOXYISOBUTYLISOCYANIDE)COPPER(I) TETRAFLUOROBORATE 1 MG/ML
10.3 INJECTION, POWDER, LYOPHILIZED, FOR SOLUTION INTRAVENOUS
Status: COMPLETED | OUTPATIENT
Start: 2024-10-09 | End: 2024-10-09

## 2024-10-09 RX ORDER — CLOPIDOGREL 300 MG/1
600 TABLET, FILM COATED ORAL ONCE
Status: COMPLETED | OUTPATIENT
Start: 2024-10-09 | End: 2024-10-09

## 2024-10-09 RX ORDER — HYDROCHLOROTHIAZIDE 25 MG/1
12.5 TABLET ORAL DAILY
Status: DISCONTINUED | OUTPATIENT
Start: 2024-10-10 | End: 2024-10-10 | Stop reason: HOSPADM

## 2024-10-09 RX ORDER — LOSARTAN POTASSIUM 50 MG/1
50 TABLET ORAL DAILY
Status: DISCONTINUED | OUTPATIENT
Start: 2024-10-10 | End: 2024-10-10 | Stop reason: HOSPADM

## 2024-10-09 RX ORDER — CLOPIDOGREL BISULFATE 75 MG/1
75 TABLET ORAL DAILY
Status: DISCONTINUED | OUTPATIENT
Start: 2024-10-10 | End: 2024-10-10

## 2024-10-09 RX ORDER — TETRAKIS(2-METHOXYISOBUTYLISOCYANIDE)COPPER(I) TETRAFLUOROBORATE 1 MG/ML
32.6 INJECTION, POWDER, LYOPHILIZED, FOR SOLUTION INTRAVENOUS
Status: COMPLETED | OUTPATIENT
Start: 2024-10-09 | End: 2024-10-09

## 2024-10-09 RX ADMIN — LOSARTAN POTASSIUM 100 MG: 100 TABLET, FILM COATED ORAL at 09:10

## 2024-10-09 RX ADMIN — TETRAKIS(2-METHOXYISOBUTYLISOCYANIDE)COPPER(I) TETRAFLUOROBORATE 32.6 MILLICURIE: 1 INJECTION, POWDER, LYOPHILIZED, FOR SOLUTION INTRAVENOUS at 12:15

## 2024-10-09 RX ADMIN — CLOPIDOGREL BISULFATE 600 MG: 300 TABLET, FILM COATED ORAL at 15:46

## 2024-10-09 RX ADMIN — SODIUM CHLORIDE, PRESERVATIVE FREE 10 ML: 5 INJECTION INTRAVENOUS at 09:11

## 2024-10-09 RX ADMIN — Medication 3 MG: at 21:32

## 2024-10-09 RX ADMIN — SODIUM CHLORIDE, PRESERVATIVE FREE 10 ML: 5 INJECTION INTRAVENOUS at 21:33

## 2024-10-09 RX ADMIN — PANTOPRAZOLE SODIUM 40 MG: 40 TABLET, DELAYED RELEASE ORAL at 06:15

## 2024-10-09 RX ADMIN — HYDROCHLOROTHIAZIDE 25 MG: 25 TABLET ORAL at 09:10

## 2024-10-09 RX ADMIN — ASPIRIN 81 MG: 81 TABLET, CHEWABLE ORAL at 09:10

## 2024-10-09 RX ADMIN — ROSUVASTATIN CALCIUM 20 MG: 20 TABLET, FILM COATED ORAL at 21:32

## 2024-10-09 RX ADMIN — TETRAKIS(2-METHOXYISOBUTYLISOCYANIDE)COPPER(I) TETRAFLUOROBORATE 10.3 MILLICURIE: 1 INJECTION, POWDER, LYOPHILIZED, FOR SOLUTION INTRAVENOUS at 10:45

## 2024-10-09 NOTE — PROGRESS NOTES
compression fracture (HCC) 11/10/2014     Past Surgical History:   Procedure Laterality Date    CATARACT REMOVAL  03/2022    Dr. Chaudhary    UT UNLISTED PROCEDURE CARDIAC SURGERY      CABG    UT UNLISTED PROCEDURE CARDIAC SURGERY  2000    bypass    UPPER GI ENDOSCOPY,BIOPSY  10/10/2019         UPPER GI ENDOSCOPY,KOBE RYAN GUIDE  10/10/2019            Prior Level of Function/Environment/Context: independent with all ADLS and IADLS, drives     Expanded or extensive additional review of patient history:   Social/Functional History  Lives With: Alone  Type of Home: House  Home Layout: Two level (can stay on 1st floor if needed, 13 steps to second level with left rail)  Home Access: Stairs to enter with rails  Entrance Stairs - Number of Steps: 4  Entrance Stairs - Rails: Left  Bathroom Shower/Tub: Walk-in shower (corner seat)  Bathroom Toilet: Standard  Home Equipment: None  Has the patient had two or more falls in the past year or any fall with injury in the past year?: Yes    Hand Dominance: right     EXAMINATION OF PERFORMANCE DEFICITS:    Cognitive/Behavioral Status:    Orientation  Overall Orientation Status: Within Normal Limits       Hearing:    intact    Vision/Perceptual:    Vision - Basic Assessment  Prior Vision: No visual deficits             Range of Motion:   AROM: Within functional limits  PROM: Within functional limits      Strength:  Strength: Within functional limits      Coordination:  Coordination: Within functional limits            Tone & Sensation:   Tone: Normal  Sensation: Intact      Functional Mobility and Transfers for ADLs:  Bed Mobility:     Bed Mobility Training  Bed Mobility Training: Yes  Overall Level of Assistance: Independent    Transfers:     Transfer Training  Transfer Training: Yes  Overall Level of Assistance: Independent                                   Balance:      Balance  Sitting: Intact  Standing: Intact      ADL Assessment:          Feeding: Independent       Grooming:  performance over the preceding 24-48 hours is important, but occasionally longer periods will be relevant.  6. Middle categories imply that the patient supplies over 50 per cent of the effort.  7. Use of aids to be independent is allowed.    Score Interpretation (from Sinoff 1997)    Independent   60-79 Minimally independent   40-59 Partially dependent   20-39 Very dependent   <20 Totally dependent     Sanjay Thrasher., Barthel, D.W. (1965). Functional evaluation: the Barthel Index. [facility] Med J (142.  HERI Heller, AVIS Cavazos., HUSEYIN Salvador., ALVINO Stephens. (1999). Measuring the change indisability after inpatient rehabilitation; comparison of the responsiveness of the Barthel Index and Functional [place] Measure. Journal of Neurology, Neurosurgery, and Psychiatry, 66(4), 480-484.  VASQUEZ Gao, LIANG Davis, & Hugh MKLEBRE (2004.) Assessment of post-stroke quality of life in cost-effectiveness studies: The usefulness of the Barthel Index and the EuroQoL-5D. Quality of Life Research, 13, 427-43       Pain Ratin/10         Activity Tolerance:   Good    After treatment:   Patient left in no apparent distress in bed, Call bell within reach, Caregiver / family present, and Side rails x3    COMMUNICATION/EDUCATION:   The patient's plan of care was discussed with: physical therapist and patient    Patient Education  Education Given To: Patient;Family  Education Provided: Role of Therapy;Plan of Care  Education Method: Verbal  Barriers to Learning: None  Education Outcome: Verbalized understanding    Thank you for this referral.  Joy Bee OTR/L  Minutes: 10    Occupational Therapy Evaluation Charge Determination   History Examination Decision-Making   LOW Complexity : Brief history review  LOW Complexity: 1-3 Performance deficits relating to physical, cognitive, or psychosocial skills that result in activity limitations and/or participation restrictions LOW  Complexity: No comorbidities that affect functional and  no verbal  or physical assist needed to complete eval tasks   Based on the above components, the patient evaluation is determined to be of the following complexity level: Low

## 2024-10-09 NOTE — PROGRESS NOTES
Hospitalist Progress Note    NAME:   Swapnil Duncan Sr   : 1940   MRN: [id number]     Date/Time: 10/9/2024 5:47 PM  Patient PCP: Donnell Moss MD    Estimated discharge date: 10/10/24  Barriers: respiratory improvement, possible PFTs, Pulm clearance      Assessment / Plan:    Dyspnea on exertion  CAD status post CABG  Essential hypertension  Hyperlipidemia  Admit to telemetry monitoring  Cardiology consulted, greatly appreciate their expertise  Trend troponins    Obtain TTE, pending  - nuclear stress test without ischemia or infarct  Full-strength aspirin , continue PTA 81 mg aspirin daily thereafter  Defer P2 Y12 inhibitor to cardiology if desired  Continue PTA losartan-hydrochlorothiazide  Continue PTA rosuvastatin  Continuous pulse oximetry and supplemental oxygen as needed maintain saturations greater than 90%  Incentive spirometer  - discussed with family, whom desired Pulmonology consult given negative stress test  - Pulm consulted, pursuing PFTs and CXR per family request     Pleural plaques secondary to asbestos exposure  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  - Pulm following as above     GERD  Continue PTA Protonix     CKD 3, at baseline  Trend renal function  Renally dose medications and avoid nephrotoxic agents      Medical Decision Making:   I personally reviewed labs: cbc bmp bnp  I personally reviewed imaging: stress test  I personally reviewed EKG:  Toxic drug monitoring: monitor cbc for anemia from asa toxicity  Discussed case with: Cardiology, RN CM    Code Status: Full  DVT Prophylaxis: lovenox      Subjective:     Chief Complaint / Reason for Physician Visit  \"dyspnea\".  Discussed with RN events overnight. Feels ok this morning.      Objective:     VITALS:   Last 24hrs VS reviewed since prior progress note. Most recent are:  Patient Vitals for the past 24 hrs:   BP Temp Temp src Pulse Resp  3.7    110*   CO2 27 27   GLUCOSE 174* 122*   BUN 20 20   CREATININE 1.44* 1.46*   CALCIUM 9.2 8.9   MG 1.8  --    BILITOT 1.0  --    AST 20  --    ALT 17  --        Signed: David L Mendel, MD

## 2024-10-09 NOTE — PROGRESS NOTES
Pt was seen for OT services. Pt is fully independent with all mobility and ADLS.  No CP or SOB on exertion with stable vitals. Pt walks 4 miles a day at baseline and is highly active.       10/09/24 1031 10/09/24 1033 10/09/24 1035   Vital Signs   Pulse 64 69 78   BP (!) 128/58 127/70 109/89   MAP (Calculated) 81 89 96   BP Location Right upper arm Right upper arm Right upper arm   BP Method Automatic Automatic Automatic   Patient Position Semi fowlers Sitting Standing   Oxygen Therapy   SpO2 95 % 97 % 96 %   O2 Device None (Room air) None (Room air) None (Room air)      10/09/24 1039   Vital Signs   Pulse  --    BP  --    MAP (Calculated)  --    BP Location  --    BP Method  --    Patient Position  --    Oxygen Therapy   SpO2 99 %   O2 Device None (Room air)

## 2024-10-09 NOTE — PROGRESS NOTES
Physical Therapy    Chart reviewed and attempted visit but patient is currently off the floor for stress test.  Will defer and continue to follow, OT note indicated patient at baseline so likely no PT needs.    DARIELA EarlT

## 2024-10-09 NOTE — ACP (ADVANCE CARE PLANNING)
Advance Care Planning     Advance Care Planning Inpatient Note  [facility]    Today's Date: 10/9/2024  Unit: [facility] 2 CARDIAC MEDICAL STEP DOWN    Received request from IDT Member.  Upon review of chart and communication with care team, patient's decision making abilities are not in question.. Patient, Spouse, and Child/Children was/were present in the room during visit.    Goals of ACP Conversation:  Discuss advance care planning documents    Health Care Decision Makers:       Primary Decision Maker: Sathish Duncan - Child - [phone]     Advance Care Planning Documents (Patient Wishes):  Healthcare Power of /Advance Directive Appointment of Health Care Agent     Assessment:  Responded to staff request for an AMD consult with patient in 2105. After reviewing document, patient asked to save a copy for further review with family. A copy provided, patient advised of spiritual health availability for support upon request.      Interventions:  Encouraged ongoing ACP conversation with future decision makers and loved ones    Care Preferences Communicated:   No    Outcomes/Plan:  ACP Discussion: Completed    Electronically signed by TESFAYE Boss on 10/9/2024 at 3:26 PM

## 2024-10-09 NOTE — CONSULTS
Name: Swapnil Duncan  Hospital: [facility]   : 1940 Admit Date: 10/8/2024   Phone: [phone]  Room:    PCP: Donnell Moss MD  MRN: [id number]   Date: 10/9/2024  Code: Full Code        HPI:      Chart and notes reviewed. Data reviewed. I review the patient's current medications in the medical record at each encounter.  I have evaluated and examined the patient.     3:10 PM       History was obtained from patient.    I was asked by Flakito Hernandez DO to see Swapnil Duncan  in consultation for a chief complaint of severe dyspnea, negative stress test.    History of Present Illness:   is an 85 yo gentleman with a PMH of asbestos exposure, CAD s/p CABG in , GERD, hypertension that presented to [facility] after transfer from OSH due to worsening dyspnea.  At baseline he is very active and walks several miles a day.  Over the last few days he has become very short of breath.  He reports that he had an pulse oximeter at home with sats in the mid 80s.  Upon arrival to Urgent Care his sats were in the high 80s with exertion.   He had a nuclear stress test today that was negative.  He had a CTA at the OSH that was negative for PE or acute lung process.      He reports that he has a history of asbestos exposure.  He saw  of our practice, last in 2019,  and was told that his lungs had normal function.  I reviewed his spirometry which does look normal, FEV1 was 135% predicted in 2016  He has never used inhalers or O2 at home.  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 He does report that he has severe acid reflux that is not always well controlled on his current PPI.    Images:  CXR [facility] report reviewed, can not see actual image    No acute process    CTA Chest [facility] report: Unremarkable base of neck and axilla. Normal esophagus. Normal heart size. Status post CABG. No aortic dissection. No pulmonary  10/10/2024] clopidogrel (PLAVIX) tablet 75 mg  75 mg Oral Daily    [START ON 10/10/2024] losartan (COZAAR) tablet 50 mg  50 mg Oral Daily    And    [START ON 10/10/2024] hydroCHLOROthiazide (HYDRODIURIL) tablet 12.5 mg  12.5 mg Oral Daily    aspirin chewable tablet 81 mg  81 mg Oral Daily    pantoprazole (PROTONIX) tablet 40 mg  40 mg Oral QAM AC    rosuvastatin (CRESTOR) tablet 20 mg  20 mg Oral Nightly    sodium chloride flush 0.9 % injection 5-40 mL  5-40 mL IntraVENous 2 times per day    sodium chloride flush 0.9 % injection 5-40 mL  5-40 mL IntraVENous PRN    0.9 % sodium chloride infusion   IntraVENous PRN    enoxaparin (LOVENOX) injection 40 mg  40 mg SubCUTAneous Daily    ondansetron (ZOFRAN-ODT) disintegrating tablet 4 mg  4 mg Oral Q8H PRN    Or    ondansetron (ZOFRAN) injection 4 mg  4 mg IntraVENous Q6H PRN    polyethylene glycol (GLYCOLAX) packet 17 g  17 g Oral Daily PRN    acetaminophen (TYLENOL) tablet 650 mg  650 mg Oral Q6H PRN    Or    acetaminophen (TYLENOL) suppository 650 mg  650 mg Rectal Q6H PRN    melatonin tablet 3 mg  3 mg Oral Nightly         REVIEW OF SYSTEMS   Negative except as stated in the HPI.      Physical Exam:   Vitals:    10/09/24 1315   BP: 130/69   Pulse: 70   Resp: 18   Temp: 98.3 °F (36.8 °C)   SpO2: 95%       General:  Alert, cooperative, no distress, appears stated age.   Head:  Normocephalic, without obvious abnormality, atraumatic.   Eyes:  Conjunctivae/corneas clear.    Nose: Nares normal. Septum midline. Mucosa normal.    Throat: Lips, mucosa, and tongue normal. Teeth and gums normal.   Neck: Supple, symmetrical, trachea midline, no adenopathy   Lungs:   Clear to auscultation bilaterally.   Chest wall:  No tenderness or deformity.   Heart:  Regular rate and rhythm, S1, S2 normal, no murmur, click, rub or gallop.   Abdomen:   Soft, non-tender. Bowel sounds normal.    Extremities: Extremities normal, atraumatic, no cyanosis or edema.   Pulses: 2+ and symmetric all

## 2024-10-09 NOTE — PLAN OF CARE
Problem: Discharge Planning  Goal: Discharge to home or other facility with appropriate resources  10/9/2024 1122 by Courtney Blanca RN (Brooke)  Outcome: Progressing  10/8/2024 2229 by Anna Marie Rubio RN  Outcome: Progressing     Problem: Safety - Adult  Goal: Free from fall injury  10/9/2024 1122 by Courtney Blanca RN (Brooke)  Outcome: Progressing  10/8/2024 2229 by Anna Marie Rubio RN  Outcome: Progressing     Problem: Pain  Goal: Verbalizes/displays adequate comfort level or baseline comfort level  10/9/2024 1122 by Courtney Blanca RN (Brooke)  Outcome: Progressing  10/8/2024 2229 by Anna Marie Rubio RN  Outcome: Progressing     Problem: Skin/Tissue Integrity  Goal: Absence of new skin breakdown  Description: 1.  Monitor for areas of redness and/or skin breakdown  2.  Assess vascular access sites hourly  3.  Every 4-6 hours minimum:  Change oxygen saturation probe site  4.  Every 4-6 hours:  If on nasal continuous positive airway pressure, respiratory therapy assess nares and determine need for appliance change or resting period.  10/9/2024 1122 by Courtney Blanca RN (Brooke)  Outcome: Progressing  10/8/2024 2229 by Anna Marie Rubio RN  Outcome: Progressing     Problem: ABCDS Injury Assessment  Goal: Absence of physical injury  Outcome: Progressing

## 2024-10-09 NOTE — CONSULTS
[facility]  [address] [place], [place] [id number] [phone]  [email or web address]       CARDIOLOGY CONSULTATION       Date of  Admission: 10/8/2024  6:42 PM     Admission type:Elective   Primary Care Physician:Donnell Moss MD     Attending Provider: Mendel, David L, MD  Cardiology Provider: [employer] -> Lois    CC/REASON FOR CONSULT: Unstable angina     Subjective:     Swapnil Duncan Sr is a 84 y.o. male admitted for Unstable angina (HCC) [I20.0].    The patient was seen and examined at the bedside.  At his baseline, the patient walks for 4 miles.  Recently, he has noticed some trouble breathing, which appears to be hard to describe.  No chest, neck or upper stomach or arm symptoms.  Patient's son is at the bedside.  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    Vitamin B12 deficiency neuropathy (HCC) 05/25/2022    Thrombocytopenia, unspecified (HCC) 05/25/2022    Acute renal failure (HCC) 06/17/2019    Coronary artery disease with unstable angina pectoris ([facility]) 06/17/2019    CKD (chronic kidney disease) stage 3, GFR 30-59 ml/min (HCC) 06/17/2019    Anxiety and depression 10/18/2018    [place] (dyspnea on exertion) 10/18/2018    Chest pain at rest 10/17/2018    Chronic idiopathic constipation 04/17/2017    Gastroesophageal reflux disease without esophagitis 04/17/2017    Ketonuria 04/17/2017    Swelling of right hand 07/07/2016    Cat scratch of right forearm with infection 06/30/2016    Cough due to ACE inhibitor 04/25/2016    Chronic bronchitis (HCC) 04/05/2016    H/O asbestos exposure 04/05/2016    Acute bronchitis 03/16/2016    ASHD (arteriosclerotic heart disease) 10/19/2015    Breast lump on left side at 11 o'clock position 05/07/2015    T12

## 2024-10-09 NOTE — PROGRESS NOTES
Spiritual Health History and Assessment/Progress Note  [facility]    Advance Care Planning,  ,  ,      Name: Swapnil Duncan Sr MRN: [id number]    Age: 84 y.o.     Sex: male   Language: English   Voodoo: Temple   Unstable angina (HCC)     Date: 10/9/2024            Total Time Calculated: 26 min              Spiritual Assessment began in [facility] 2 CARDIAC MEDICAL STEP DOWN        Referral/Consult From: Multi-disciplinary team   Encounter Overview/Reason: Advance Care Planning  Service Provided For: Patient and family together    Kaylah, Belief, Meaning:   Patient is connected with a kaylah tradition or spiritual practice and has beliefs or practices that help with coping during difficult times  Family/Friends are connected with a kaylah tradition or spiritual practice and have beliefs or practices that help with coping during difficult times      Importance and Influence:  Patient has spiritual/personal beliefs that influence decisions regarding their health  Family/Friends have spiritual/personal beliefs that influence decisions regarding the patient's health    Community:  Patient is connected with a spiritual community and feels well-supported. Support system includes: Spouse/Partner, Children, Kaylah Community, Friends, and Extended family  Family/Friends are connected with a spiritual community: and feel well-supported. Support system includes: Spouse/Partner, Children, [place] Community, Friends, and Extended family    Assessment and Plan of Care:     Patient Interventions include: Facilitated expression of thoughts and feelings, Explored spiritual coping/struggle/distress, Affirmed coping skills/support systems, and Facilitated life review and/ or legacy  Family/Friends Interventions include: Facilitated expression of thoughts and feelings, Explored spiritual coping/struggle/distress, Affirmed coping skills/support systems, and Facilitated life review and/or legacy    Patient Plan of Care: No

## 2024-10-09 NOTE — PROGRESS NOTES
Balance  Sitting: Intact  Standing: Intact  Ambulation/Gait Training:                       Gait  Gait Training: Yes  Overall Level of Assistance: Independent  Distance (ft): 200 Feet  Assistive Device: Gait belt  Interventions: Safety awareness training                                                                                                                                                                                                                                                              [facility] AM-PAC®      Basic Mobility Inpatient Short Form (6-Clicks) Version 2  How much HELP from another person do you currently need... (If the patient hasn't done an activity recently, how much help from another person do you think they would need if they tried?) Total A Lot A Little None   1.  Turning from your back to your side while in a flat bed without using bedrails? []  1 []  2 []  3  [x]  4   2.  Moving from lying on your back to sitting on the side of a flat bed without using bedrails? []  1 []  2 []  3  [x]  4   3.  Moving to and from a bed to a chair (including a wheelchair)? []  1 []  2 []  3  [x]  4   4. Standing up from a chair using your arms (e.g. wheelchair or bedside chair)? []  1 []  2 []  3  [x]  4   5.  Walking in hospital room? []  1 []  2 []  3  [x]  4   6.  Climbing 3-5 steps with a railing? []  1 []  2 []  3  [x]  4     Raw Score: 24/24                            Cutoff score <=171,2,3 had higher odds of discharging home with home health or need of SNF/IPR.    1. Caro Paula, Kylee Espinoza, Miller Sams, Neyda Thomas, Gurwinder Kimble, Fish Paula.  Validity of the AM-PAC “6-Clicks” Inpatient Daily Activity and Basic Mobility Short Forms. Physical Therapy Mar 2014, 94 3) [phone]; DOI: 10.2522/ptj.23669181  2. Jimmy MOLINA, Griselda LOPEZ, Noé LOPEZ, Andrey LOPEZ. Association of AM-PAC \"6-Clicks\" Basic Mobility and Daily Activity Scores With Discharge Destination.  Phys Ther. 2021 Apr 4;101(4):qncf810. doi: 10.1093/ptj/gxnw776. PMID: 39324968.  3. Glenny LOPEZ, Keya DOWELL, Luisana S, Collin SAVAGE, Tessy S. Activity Measure for Post-Acute Care \"6-Clicks\" Basic Mobility Scores Predict Discharge Destination After Acute Care Hospitalization in Select Patient Groups: A Retrospective, Observational Study. Arch Rehabil Res Clin Transl. 2022 Jul 16;4(3):545239. doi: 10.1016/j.arrct.2022.980261. PMID: 53265715; PMCID: QSH8961721.  4. Chai CHANG, Lety S, Ginger W, Beba P. AM-PAC Short Forms Manual 4.0. Revised 2/2020.                                                                                                                                                                                                                                Activity Tolerance:   Good    After treatment:   Patient left in no apparent distress in bed, Call bell within reach, and Caregiver / family present      COMMUNICATION/EDUCATION:   The patient's plan of care was discussed with: registered nurse    Patient Education  Education Given To: Patient  Education Provided: Role of Therapy;Plan of Care;Fall Prevention Strategies  Education Method: Verbal  Barriers to Learning: None  Education Outcome: Verbalized understanding    Thank you for this referral.  Treva Stephens, PT  Minutes: 21      Physical Therapy Evaluation Charge Determination   History Examination Presentation Decision-Making   MEDIUM  Complexity : 1-2 comorbidities / personal factors will impact the outcome/ POC  LOW Complexity : 1-2 Standardized tests and measures addressing body structure, function, activity limitation and / or participation in recreation  LOW Complexity : Stable, uncomplicated  AM-PAC  LOW      Based on the above components, the patient evaluation is determined to be of the following complexity level: Low

## 2024-10-10 VITALS
WEIGHT: 173.5 LBS | DIASTOLIC BLOOD PRESSURE: 60 MMHG | BODY MASS INDEX: 26.3 KG/M2 | RESPIRATION RATE: 17 BRPM | SYSTOLIC BLOOD PRESSURE: 134 MMHG | HEART RATE: 65 BPM | OXYGEN SATURATION: 96 % | HEIGHT: 68 IN | TEMPERATURE: 98 F

## 2024-10-10 LAB
ANION GAP SERPL CALC-SCNC: 4 MMOL/L (ref 2–12)
BUN SERPL-MCNC: 17 MG/DL (ref 6–20)
BUN/CREAT SERPL: 13 (ref 12–20)
CALCIUM SERPL-MCNC: 8.8 MG/DL (ref 8.5–10.1)
CHLORIDE SERPL-SCNC: 108 MMOL/L (ref 97–108)
CO2 SERPL-SCNC: 29 MMOL/L (ref 21–32)
CREAT SERPL-MCNC: 1.27 MG/DL (ref 0.7–1.3)
GLUCOSE SERPL-MCNC: 124 MG/DL (ref 65–100)
POTASSIUM SERPL-SCNC: 3.9 MMOL/L (ref 3.5–5.1)
SODIUM SERPL-SCNC: 141 MMOL/L (ref 136–145)

## 2024-10-10 PROCEDURE — 2580000003 HC RX 258: Performed by: STUDENT IN AN ORGANIZED HEALTH CARE EDUCATION/TRAINING PROGRAM

## 2024-10-10 PROCEDURE — 6370000000 HC RX 637 (ALT 250 FOR IP): Performed by: STUDENT IN AN ORGANIZED HEALTH CARE EDUCATION/TRAINING PROGRAM

## 2024-10-10 PROCEDURE — 80048 BASIC METABOLIC PNL TOTAL CA: CPT

## 2024-10-10 RX ORDER — HYDROCHLOROTHIAZIDE 12.5 MG/1
12.5 TABLET ORAL DAILY
Qty: 30 TABLET | Refills: 3 | Status: SHIPPED | OUTPATIENT
Start: 2024-10-11

## 2024-10-10 RX ORDER — LOSARTAN POTASSIUM 50 MG/1
50 TABLET ORAL DAILY
Qty: 30 TABLET | Refills: 3 | Status: SHIPPED | OUTPATIENT
Start: 2024-10-11

## 2024-10-10 RX ADMIN — ASPIRIN 81 MG: 81 TABLET, CHEWABLE ORAL at 08:48

## 2024-10-10 RX ADMIN — HYDROCHLOROTHIAZIDE 12.5 MG: 25 TABLET ORAL at 08:47

## 2024-10-10 RX ADMIN — CLOPIDOGREL BISULFATE 75 MG: 75 TABLET ORAL at 08:47

## 2024-10-10 RX ADMIN — PANTOPRAZOLE SODIUM 40 MG: 40 TABLET, DELAYED RELEASE ORAL at 06:38

## 2024-10-10 RX ADMIN — LOSARTAN POTASSIUM 50 MG: 50 TABLET, FILM COATED ORAL at 08:47

## 2024-10-10 RX ADMIN — SODIUM CHLORIDE, PRESERVATIVE FREE 10 ML: 5 INJECTION INTRAVENOUS at 08:47

## 2024-10-10 NOTE — PLAN OF CARE
Problem: Discharge Planning  Goal: Discharge to home or other facility with appropriate resources  10/10/2024 0915 by Courtney Blanca RN (Brooke)  Outcome: Progressing  10/10/2024 0010 by Ashleigh Larson RN  Outcome: Progressing  Flowsheets (Taken 10/9/2024 1930)  Discharge to home or other facility with appropriate resources:   Identify barriers to discharge with patient and caregiver   Arrange for needed discharge resources and transportation as appropriate     Problem: Safety - Adult  Goal: Free from fall injury  10/10/2024 0915 by Courtney Blanca RN (Brooke)  Outcome: Progressing  10/10/2024 0010 by Ashleigh Larson RN  Outcome: Progressing     Problem: Pain  Goal: Verbalizes/displays adequate comfort level or baseline comfort level  10/10/2024 0915 by Courtney Blanca RN (Brooke)  Outcome: Progressing  10/10/2024 0010 by Ashleigh Larson RN  Outcome: Progressing     Problem: Skin/Tissue Integrity  Goal: Absence of new skin breakdown  Description: 1.  Monitor for areas of redness and/or skin breakdown  2.  Assess vascular access sites hourly  3.  Every 4-6 hours minimum:  Change oxygen saturation probe site  4.  Every 4-6 hours:  If on nasal continuous positive airway pressure, respiratory therapy assess nares and determine need for appliance change or resting period.  10/10/2024 0915 by Courtney Blanca RN (Brooke)  Outcome: Progressing  10/10/2024 0010 by Ashleigh Larson RN  Outcome: Progressing     Problem: ABCDS Injury Assessment  Goal: Absence of physical injury  10/10/2024 0915 by Courtney Blanca RN (Brooke)  Outcome: Progressing  10/10/2024 0010 by Ashleigh Larson RN  Outcome: Progressing

## 2024-10-10 NOTE — PROGRESS NOTES
AVS reviewed with patient. All questions answered and follows reviewed with patient. Tele and IV removed. Pt transport home by son. Pt walked down with nurse.

## 2024-10-10 NOTE — PROGRESS NOTES
Attempted to schedule PCP hospital follow up appointment. Unable to reach anyone, unable to leave voicemail. [facility] placed [facility] information AVS for patient resource.  Pending patient discharge. Krystyna Burrell, Care Management Assistant

## 2024-10-10 NOTE — PLAN OF CARE
Problem: Discharge Planning  Goal: Discharge to home or other facility with appropriate resources  10/10/2024 0010 by Ashleigh Larson RN  Outcome: Progressing  Flowsheets (Taken 10/9/2024 1930)  Discharge to home or other facility with appropriate resources:   Identify barriers to discharge with patient and caregiver   Arrange for needed discharge resources and transportation as appropriate  10/9/2024 1122 by Courtney Blanca RN (Brooke)  Outcome: Progressing     Problem: Safety - Adult  Goal: Free from fall injury  10/10/2024 0010 by Ashleigh Larson RN  Outcome: Progressing  10/9/2024 1122 by Courtney Blanca RN (Brooke)  Outcome: Progressing     Problem: Pain  Goal: Verbalizes/displays adequate comfort level or baseline comfort level  10/10/2024 0010 by Ashleigh Larson RN  Outcome: Progressing  10/9/2024 1122 by Courtney Blanca RN (Brooke)  Outcome: Progressing     Problem: Skin/Tissue Integrity  Goal: Absence of new skin breakdown  Description: 1.  Monitor for areas of redness and/or skin breakdown  2.  Assess vascular access sites hourly  3.  Every 4-6 hours minimum:  Change oxygen saturation probe site  4.  Every 4-6 hours:  If on nasal continuous positive airway pressure, respiratory therapy assess nares and determine need for appliance change or resting period.  10/10/2024 0010 by Ashleigh Larson RN  Outcome: Progressing  10/9/2024 1122 by Courtney Blanca RN (Brooke)  Outcome: Progressing     Problem: ABCDS Injury Assessment  Goal: Absence of physical injury  10/10/2024 0010 by Ashleigh Larson RN  Outcome: Progressing  10/9/2024 1122 by Courtney Blanca RN (Brooke)  Outcome: Progressing

## 2024-10-10 NOTE — PROGRESS NOTES
Home Oxygen Test  Date of test: 10/10/2024    Time of test: 1130    Sa02 98 % on room air AT REST.  Sa02 96-97 % on room air DURING AMBULATION.  Sa02 99 % on 1 Liters DURING AMBULATION.  Sa02 100 % on 1 Liters AT REST/AFTER AMBULATION.

## 2024-10-10 NOTE — CARE COORDINATION
Patient is clear from a CM standpoint.    Care Management Initial Assessment       RUR: 10% (low RUR)  Readmission? No  1st IM letter given? Yes - 10/9/2024  1st  letter given: No     1103 to 1107 - CM contacted patient to complete initial assessment.  If he needs medications ordered today, his physical pharmacy is: [facility] [address] [place] [address] [address] - P [phone] - F [phone].  RN notified that patient is clear from a CM standpoint.     10/10/24 1105   Service Assessment   Patient Orientation Alert and Oriented   Cognition Alert   History Provided By Patient   Primary Caregiver Self   Accompanied By/Relationship N/A   Support Systems Children   Patient's Healthcare Decision Maker is: Legal Next of Kin   PCP Verified by CM Yes   Last Visit to PCP Within last year   Prior Functional Level Independent in ADLs/IADLs   Current Functional Level Independent in ADLs/IADLs   Can patient return to prior living arrangement Yes   Ability to make needs known: Good   Family able to assist with home care needs: Yes   Would you like for me to discuss the discharge plan with any other family members/significant others, and if so, who? No   Financial Resources Medicare   Community Resources None   Social/Functional History   Lives With Alone   Type of Home House   Home Layout Two level;Able to Live on Main level with bedroom/bathroom   Entrance Stairs - Number of Steps 4   Home Equipment None   ADL Assistance Independent   Homemaking Assistance Independent   Ambulation Assistance Independent   Transfer Assistance Independent   Active  Yes   Mode of Transportation Car   Discharge Planning   Type of Residence House   Living Arrangements Children   Current Services Prior To Admission None   Potential Assistance Needed N/A   DME Ordered? No   Potential Assistance Purchasing Medications No   Type of Home Care Services None   Patient expects to be discharged to: House   Services At/After  Discharge   Transition of Care Consult (CM Consult) N/A   Services At/After Discharge None   Mode of Transport at Discharge Other (see comment)  (Family)   Confirm Follow Up Transport Family   Condition of Participation: Discharge Planning   The Plan for Transition of Care is related to the following treatment goals: Return home with follow ups; [facility] contact information listed on AVS   The Patient and/or Patient Representative was provided with a Choice of Provider? Patient   The Patient and/Or Patient Representative agree with the Discharge Plan? Yes     Advance Care Planning     General Advance Care Planning (ACP) Conversation    Date of Conversation: 10/10/2024  Conducted with: Patient with Decision Making Capacity  Other persons present: None    Healthcare Decision Maker:   Primary Decision Maker: DuncanSathish maher  Child - [phone]     Today we documented Decision Maker(s) consistent with Legal Next of Kin hierarchy.  Content/Action Overview:  Has ACP document(s) on file - do NOT reflect the patient's care preferences, patient to provide new document(s)  Reviewed DNR/DNI and patient elects Full Code (Attempt Resuscitation)  treatment goals    Length of Voluntary ACP Conversation in minutes:  <16 minutes (Non-Billable)    Julianne Suárez, RN          JESUS LightN, RN    Care Management  [phone]

## 2024-10-11 ENCOUNTER — TELEPHONE (OUTPATIENT)
Age: 84
End: 2024-10-11

## 2024-10-11 NOTE — TELEPHONE ENCOUNTER
Care Transitions Initial Follow Up Call    Outreach made within 2 business days of discharge: Yes    Patient: Swapnil Duncan Sr Patient : 1940   MRN: [id number]  Reason for Admission: unstable angina  Discharge Date: 10/10/24       Spoke with: Sathish (child)    Discharge department/facility: [facility] Interactive Patient Contact:  Was patient able to fill all prescriptions: Yes  Was patient instructed to bring all medications to the follow-up visit: Yes  Is patient taking all medications as directed in the discharge summary? Yes  Does patient understand their discharge instructions: Yes  Does patient have questions or concerns that need addressed prior to 7-14 day follow up office visit: no    Additional needs identified to be addressed with provider  No needs identified             Scheduled appointment with PCP within 7-14 days    Follow Up  Future Appointments   Date Time Provider Department Center   10/18/2024  8:00 AM Donnell Moss MD [facility]   2024  3:00 PM Leonidas Abreu MD BS [facility]       Joy Morris

## 2024-10-11 NOTE — DISCHARGE SUMMARY
Discharge Summary    Name: Swapnil Duncan Sr  [id number]  YOB: 1940 (Age: 84 y.o.)   Date of Admission: 10/8/2024  Date of Discharge: 10/10/24  Attending Physician: Dr. 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  male with PMHx as listed below presenting to the emergency department with complaints of a few days progressively worsening dyspnea on exertion most notable when walking up the stairs in his house.  Patient reports at baseline he is very active and walks 4 miles a day uninterrupted for his health without issue, but over the past few days has had to take breaks due to shortness of breath.  Today after walking upstairs became very dyspneic noting he had epigastric/lower substernal chest discomfort as well as sensation of palpitations and checked his pulse oximeter which read at 85% with a pulse reportedly in the 70s prompting him to present to the emergency department for evaluation.  At OSH, workup largely unremarkable including CTA chest, standard troponin assay, CBC and CMP.  However, given concerning story was transferred to our facility for cardiology evaluation.  Previously followed with Dr. Rivera who has since retired and has an upcoming appointment with Dr. Abreu.  Has pertinent past medical history of CAD status post remote CABG.  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  Patient denies tobacco or illicit drug use.  Reports occasional alcohol consumption.     On arrival, patient afebrile  ultrasound if unable to get one with Cardiology.    [facility]  Phone: [phone]  Follow up  Please contact to have a provider see you at home, if needed.    [facility] That Comes To Your Home  [email or web address]  [place]  [phone]  Follow up  As needed - [facility] is a mobile urgent care provider that comes to your home. You may call them if you would like to set up an appt to be seen for a follow up while waiting to be seen by your PCP.    Mary Mejia MD  [address] [id number] [phone]    Follow up in 1 week(s)  please followup to obtain an echocardiogram (heart ultrasound)    Phu Calix MD  [address]  [address]  [facility] [address] [phone]    Follow up  please followup to obtain further lung testing          Total time in minutes spent coordinating this discharge (includes going over instructions, follow-up, prescriptions, and preparing report for sign off to her PCP) :  35 minutes    David L Mendel, MD

## 2024-10-16 NOTE — PROGRESS NOTES
Physician Progress Note      PATIENT:               ROSSY GRUBBS SR  CSN #:                  [id number]  :                       1940  ADMIT DATE:       10/8/2024 6:42 PM  DISCH DATE:        10/10/2024 5:35 PM  RESPONDING  PROVIDER #:        David L Mendel MD          QUERY TEXT:    Pt admitted with dyspnea.  Pt noted to have \"unstable angina\" (10/9cardiology   pn). If possible, please document in progress notes and discharge summary if   you are evaluating and/or treating any of the following:    The medical record reflects the following:  Risk Factors: CAD s/p CABG  Clinical Indicators:10/9 cardiology pn-unstable angina  Treatment: card cx, stress test negative  Options provided:  -- dyspnea due to unstable angina  -- Chest pain due to ***  -- Other - I will add my own diagnosis  -- Disagree - Not applicable / Not valid  -- Disagree - Clinically unable to determine / Unknown  -- Refer to Clinical Documentation Reviewer    PROVIDER RESPONSE TEXT:    Dyspnea likely 2/2 pulmonary process given negative stress test    Query created by: Roslyn Olivera on 10/14/2024 3:35 PM      Electronically signed by:  David L Mendel MD 10/16/2024 11:14 AM

## 2024-10-18 ENCOUNTER — OFFICE VISIT (OUTPATIENT)
Age: 84
End: 2024-10-18

## 2024-10-18 VITALS
TEMPERATURE: 97.8 F | RESPIRATION RATE: 18 BRPM | OXYGEN SATURATION: 98 % | DIASTOLIC BLOOD PRESSURE: 69 MMHG | HEIGHT: 68 IN | BODY MASS INDEX: 26.55 KG/M2 | WEIGHT: 175.2 LBS | SYSTOLIC BLOOD PRESSURE: 164 MMHG | HEART RATE: 61 BPM

## 2024-10-18 DIAGNOSIS — Z09 HOSPITAL DISCHARGE FOLLOW-UP: Primary | ICD-10-CM

## 2024-10-18 DIAGNOSIS — J92.0 CALCIFIED PLEURAL PLAQUE DUE TO ASBESTOS EXPOSURE: ICD-10-CM

## 2024-10-18 DIAGNOSIS — Z95.1 S/P CABG X 3: ICD-10-CM

## 2024-10-18 RX ORDER — OMEPRAZOLE 40 MG/1
40 CAPSULE, DELAYED RELEASE ORAL DAILY
Qty: 90 CAPSULE | Refills: 3 | Status: SHIPPED | OUTPATIENT
Start: 2024-10-18

## 2024-10-18 RX ORDER — LOSARTAN POTASSIUM AND HYDROCHLOROTHIAZIDE 12.5; 1 MG/1; MG/1
1 TABLET ORAL DAILY
Qty: 90 TABLET | Refills: 1 | Status: SHIPPED | OUTPATIENT
Start: 2024-10-18

## 2024-10-18 RX ORDER — AZITHROMYCIN 250 MG/1
TABLET, FILM COATED ORAL
Qty: 6 TABLET | Refills: 0 | Status: SHIPPED | OUTPATIENT
Start: 2024-10-18 | End: 2024-10-28

## 2024-10-18 RX ORDER — ROSUVASTATIN CALCIUM 20 MG/1
20 TABLET, COATED ORAL NIGHTLY
Qty: 90 TABLET | Refills: 3 | Status: SHIPPED | OUTPATIENT
Start: 2024-10-18

## 2024-10-18 RX ORDER — FAMOTIDINE 20 MG/1
20 TABLET, FILM COATED ORAL 2 TIMES DAILY
Qty: 180 TABLET | Refills: 3 | Status: SHIPPED | OUTPATIENT
Start: 2024-10-18

## 2024-10-18 RX ORDER — ISOSORBIDE MONONITRATE 30 MG/1
TABLET, EXTENDED RELEASE ORAL
COMMUNITY

## 2024-10-18 ASSESSMENT — PATIENT HEALTH QUESTIONNAIRE - PHQ9
2. FEELING DOWN, DEPRESSED OR HOPELESS: NOT AT ALL
SUM OF ALL RESPONSES TO PHQ9 QUESTIONS 1 & 2: 0
SUM OF ALL RESPONSES TO PHQ QUESTIONS 1-9: 0
SUM OF ALL RESPONSES TO PHQ QUESTIONS 1-9: 0
7. TROUBLE CONCENTRATING ON THINGS, SUCH AS READING THE NEWSPAPER OR WATCHING TELEVISION: NOT AT ALL
8. MOVING OR SPEAKING SO SLOWLY THAT OTHER PEOPLE COULD HAVE NOTICED. OR THE OPPOSITE, BEING SO FIGETY OR RESTLESS THAT YOU HAVE BEEN MOVING AROUND A LOT MORE THAN USUAL: NOT AT ALL
5. POOR APPETITE OR OVEREATING: NOT AT ALL
3. TROUBLE FALLING OR STAYING ASLEEP: NOT AT ALL
1. LITTLE INTEREST OR PLEASURE IN DOING THINGS: NOT AT ALL
SUM OF ALL RESPONSES TO PHQ QUESTIONS 1-9: 0
SUM OF ALL RESPONSES TO PHQ QUESTIONS 1-9: 0
6. FEELING BAD ABOUT YOURSELF - OR THAT YOU ARE A FAILURE OR HAVE LET YOURSELF OR YOUR FAMILY DOWN: NOT AT ALL
4. FEELING TIRED OR HAVING LITTLE ENERGY: NOT AT ALL
9. THOUGHTS THAT YOU WOULD BE BETTER OFF DEAD, OR OF HURTING YOURSELF: NOT AT ALL
10. IF YOU CHECKED OFF ANY PROBLEMS, HOW DIFFICULT HAVE THESE PROBLEMS MADE IT FOR YOU TO DO YOUR WORK, TAKE CARE OF THINGS AT HOME, OR GET ALONG WITH OTHER PEOPLE: NOT DIFFICULT AT ALL

## 2024-10-18 NOTE — PROGRESS NOTES
Chief Complaint   Patient presents with    Follow-Up from Hospital     Patient is here today for a hospital follow up.      \"Have you been to the ER, urgent care clinic since your last visit?  Hospitalized since your last visit?\"    10/8/24-10/10/24 [facility] for SOB    “Have you seen or consulted any other health care providers outside of [facility] since your last visit?”    NO            Click Here for Release of Records Request

## 2024-10-18 NOTE — PROGRESS NOTES
Post-Discharge Transitional Care  Follow Up      Swapnil Duncan Sr   YOB: 1940    Date of Office Visit:  10/18/2024  Date of Hospital Admission: 10/8/24  Date of Hospital Discharge: 10/10/24  Risk of hospital readmission (high >=14%. Medium >=10%) :Readmission Risk Score: 9.6      Care management risk score Rising risk (score 2-5) and Complex Care (Scores >=6): No Risk Score On File     Non face to face  following discharge, date last encounter closed (first attempt may have been earlier): 10/11/2024    Call initiated 2 business days of discharge: Yes    ASSESSMENT/PLAN:   Hospital discharge follow-up  -     SD DISCHARGE MEDS RECONCILED W/ CURRENT OUTPATIENT MED LIST  -     famotidine (PEPCID) 20 MG tablet; Take 1 tablet by mouth 2 times daily, Disp-180 tablet, R-3Normal  S/P CABG x 3  -     famotidine (PEPCID) 20 MG tablet; Take 1 tablet by mouth 2 times daily, Disp-180 tablet, R-3Normal  Calcified pleural plaque due to asbestos exposure  -     [facility]  -     famotidine (PEPCID) 20 MG tablet; Take 1 tablet by mouth 2 times daily, Disp-180 tablet, R-3Normal    Medical Decision Making: high complexity  Return in 1 month (on 2024).           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

## 2024-12-30 ENCOUNTER — TELEPHONE (OUTPATIENT)
Age: 84
End: 2024-12-30

## 2024-12-30 DIAGNOSIS — I42.9 CARDIOMYOPATHY, UNSPECIFIED TYPE (HCC): ICD-10-CM

## 2024-12-30 DIAGNOSIS — Z95.1 S/P CABG X 3: ICD-10-CM

## 2024-12-30 DIAGNOSIS — I25.110 CORONARY ARTERY DISEASE WITH UNSTABLE ANGINA PECTORIS (HCC): ICD-10-CM

## 2024-12-30 DIAGNOSIS — Z95.1 S/P CABG (CORONARY ARTERY BYPASS GRAFT): ICD-10-CM

## 2024-12-30 DIAGNOSIS — R07.9 CHEST PAIN, UNSPECIFIED TYPE: ICD-10-CM

## 2024-12-30 DIAGNOSIS — I20.0 UNSTABLE ANGINA (HCC): ICD-10-CM

## 2024-12-30 DIAGNOSIS — R07.9 CHEST PAIN AT REST: ICD-10-CM

## 2024-12-30 DIAGNOSIS — I25.10 ATHEROSCLEROSIS OF NATIVE CORONARY ARTERY OF NATIVE HEART WITHOUT ANGINA PECTORIS: Primary | ICD-10-CM

## 2024-12-30 DIAGNOSIS — E78.2 MIXED HYPERLIPIDEMIA: ICD-10-CM

## 2025-01-31 ENCOUNTER — ANCILLARY PROCEDURE (OUTPATIENT)
Age: 85
End: 2025-01-31
Payer: MEDICARE

## 2025-01-31 VITALS
BODY MASS INDEX: 25.61 KG/M2 | WEIGHT: 169 LBS | SYSTOLIC BLOOD PRESSURE: 165 MMHG | HEIGHT: 68 IN | DIASTOLIC BLOOD PRESSURE: 70 MMHG | HEART RATE: 56 BPM

## 2025-01-31 DIAGNOSIS — I25.110 CORONARY ARTERY DISEASE WITH UNSTABLE ANGINA PECTORIS (HCC): ICD-10-CM

## 2025-01-31 DIAGNOSIS — I20.0 UNSTABLE ANGINA (HCC): ICD-10-CM

## 2025-01-31 DIAGNOSIS — E78.2 MIXED HYPERLIPIDEMIA: ICD-10-CM

## 2025-01-31 DIAGNOSIS — I42.9 CARDIOMYOPATHY, UNSPECIFIED TYPE (HCC): ICD-10-CM

## 2025-01-31 DIAGNOSIS — R07.9 CHEST PAIN, UNSPECIFIED TYPE: ICD-10-CM

## 2025-01-31 DIAGNOSIS — Z95.1 S/P CABG X 3: ICD-10-CM

## 2025-01-31 DIAGNOSIS — Z95.1 S/P CABG (CORONARY ARTERY BYPASS GRAFT): ICD-10-CM

## 2025-01-31 DIAGNOSIS — I25.10 ATHEROSCLEROSIS OF NATIVE CORONARY ARTERY OF NATIVE HEART WITHOUT ANGINA PECTORIS: ICD-10-CM

## 2025-01-31 DIAGNOSIS — R07.9 CHEST PAIN AT REST: ICD-10-CM

## 2025-01-31 PROCEDURE — 93306 TTE W/DOPPLER COMPLETE: CPT | Performed by: INTERNAL MEDICINE

## 2025-02-01 LAB
ECHO AO ASC DIAM: 3.7 CM
ECHO AO ASCENDING AORTA INDEX: 1.95 CM/M2
ECHO AO ROOT DIAM: 3.6 CM
ECHO AO ROOT INDEX: 1.89 CM/M2
ECHO AV AREA PEAK VELOCITY: 1.8 CM2
ECHO AV AREA VTI: 1.9 CM2
ECHO AV AREA/BSA PEAK VELOCITY: 0.9 CM2/M2
ECHO AV AREA/BSA VTI: 1 CM2/M2
ECHO AV MEAN GRADIENT: 7 MMHG
ECHO AV MEAN VELOCITY: 1.3 M/S
ECHO AV PEAK GRADIENT: 13 MMHG
ECHO AV PEAK VELOCITY: 1.8 M/S
ECHO AV VELOCITY RATIO: 0.5
ECHO AV VTI: 38.6 CM
ECHO BSA: 1.92 M2
ECHO EST RA PRESSURE: 8 MMHG
ECHO LA DIAMETER INDEX: 2.32 CM/M2
ECHO LA DIAMETER: 4.4 CM
ECHO LA TO AORTIC ROOT RATIO: 1.22
ECHO LA VOL A-L A2C: 50 ML (ref 18–58)
ECHO LA VOL A-L A4C: 54 ML (ref 18–58)
ECHO LA VOL BP: 50 ML (ref 18–58)
ECHO LA VOL MOD A2C: 48 ML (ref 18–58)
ECHO LA VOL MOD A4C: 51 ML (ref 18–58)
ECHO LA VOL/BSA BIPLANE: 26 ML/M2 (ref 16–34)
ECHO LA VOLUME AREA LENGTH: 52 ML
ECHO LA VOLUME INDEX A-L A2C: 26 ML/M2 (ref 16–34)
ECHO LA VOLUME INDEX A-L A4C: 28 ML/M2 (ref 16–34)
ECHO LA VOLUME INDEX AREA LENGTH: 27 ML/M2 (ref 16–34)
ECHO LA VOLUME INDEX MOD A2C: 25 ML/M2 (ref 16–34)
ECHO LA VOLUME INDEX MOD A4C: 27 ML/M2 (ref 16–34)
ECHO LV E' LATERAL VELOCITY: 12.98 CM/S
ECHO LV E' SEPTAL VELOCITY: 11.91 CM/S
ECHO LV EDV A2C: 75 ML
ECHO LV EDV A4C: 66 ML
ECHO LV EDV BP: 70 ML (ref 67–155)
ECHO LV EDV INDEX A4C: 35 ML/M2
ECHO LV EDV INDEX BP: 37 ML/M2
ECHO LV EDV NDEX A2C: 39 ML/M2
ECHO LV EJECTION FRACTION A2C: 62 %
ECHO LV EJECTION FRACTION A4C: 68 %
ECHO LV EJECTION FRACTION BIPLANE: 64 % (ref 55–100)
ECHO LV ESV A2C: 29 ML
ECHO LV ESV A4C: 21 ML
ECHO LV ESV BP: 26 ML (ref 22–58)
ECHO LV ESV INDEX A2C: 15 ML/M2
ECHO LV ESV INDEX A4C: 11 ML/M2
ECHO LV ESV INDEX BP: 14 ML/M2
ECHO LV FRACTIONAL SHORTENING: 29 % (ref 28–44)
ECHO LV INTERNAL DIMENSION DIASTOLE INDEX: 2.37 CM/M2
ECHO LV INTERNAL DIMENSION DIASTOLIC: 4.5 CM (ref 4.2–5.9)
ECHO LV INTERNAL DIMENSION SYSTOLIC INDEX: 1.68 CM/M2
ECHO LV INTERNAL DIMENSION SYSTOLIC: 3.2 CM
ECHO LV IVSD: 0.8 CM (ref 0.6–1)
ECHO LV MASS 2D: 113.6 G (ref 88–224)
ECHO LV MASS INDEX 2D: 59.8 G/M2 (ref 49–115)
ECHO LV POSTERIOR WALL DIASTOLIC: 0.8 CM (ref 0.6–1)
ECHO LV RELATIVE WALL THICKNESS RATIO: 0.36
ECHO LVOT AREA: 3.5 CM2
ECHO LVOT AV VTI INDEX: 0.53
ECHO LVOT DIAM: 2.1 CM
ECHO LVOT MEAN GRADIENT: 2 MMHG
ECHO LVOT PEAK GRADIENT: 3 MMHG
ECHO LVOT PEAK VELOCITY: 0.9 M/S
ECHO LVOT STROKE VOLUME INDEX: 37.4 ML/M2
ECHO LVOT SV: 71 ML
ECHO LVOT VTI: 20.5 CM
ECHO MV A VELOCITY: 0.87 M/S
ECHO MV E DECELERATION TIME (DT): 180.2 MS
ECHO MV E VELOCITY: 0.93 M/S
ECHO MV E/A RATIO: 1.07
ECHO MV E/E' LATERAL: 7.16
ECHO MV E/E' RATIO (AVERAGED): 7.49
ECHO MV E/E' SEPTAL: 7.81
ECHO PULMONARY ARTERY END DIASTOLIC PRESSURE: 4 MMHG
ECHO PV MAX VELOCITY: 1.2 M/S
ECHO PV PEAK GRADIENT: 6 MMHG
ECHO PV REGURGITANT MAX VELOCITY: 1 M/S
ECHO RIGHT VENTRICULAR SYSTOLIC PRESSURE (RVSP): 39 MMHG
ECHO RV BASAL DIMENSION: 3.9 CM
ECHO RV TAPSE: 1.7 CM (ref 1.7–?)
ECHO RVOT PEAK GRADIENT: 2 MMHG
ECHO RVOT PEAK VELOCITY: 0.8 M/S
ECHO TV REGURGITANT MAX VELOCITY: 2.77 M/S
ECHO TV REGURGITANT PEAK GRADIENT: 31 MMHG

## 2025-02-01 PROCEDURE — 93306 TTE W/DOPPLER COMPLETE: CPT | Performed by: INTERNAL MEDICINE

## 2025-02-03 ENCOUNTER — TELEPHONE (OUTPATIENT)
Age: 85
End: 2025-02-03

## 2025-02-03 NOTE — TELEPHONE ENCOUNTER
----- Message from Dr. Leonidas Abreu MD sent at 2/2/2025  7:00 PM EST -----  Please advise echo shows normal heart function with no significant valve problems.  Follow-up as scheduled.     Future Appointments  6/20/2025  9:40 AM    Donnell Moss MD       [facility]  6/27/2025  11:20 AM   Leonidas Abreu MD    BS                BS AMB

## 2025-02-03 NOTE — RESULT ENCOUNTER NOTE
Please advise echo shows normal heart function with no significant valve problems.  Follow-up as scheduled.     Future Appointments  6/20/2025  9:40 AM    Donnell Moss MD       [facility]  6/27/2025  11:20 AM   Leonidas Abreu MD    BS                BS AMB

## 2025-06-17 SDOH — ECONOMIC STABILITY: FOOD INSECURITY: WITHIN THE PAST 12 MONTHS, THE FOOD YOU BOUGHT JUST DIDN'T LAST AND YOU DIDN'T HAVE MONEY TO GET MORE.: NEVER TRUE

## 2025-06-17 SDOH — HEALTH STABILITY: PHYSICAL HEALTH: ON AVERAGE, HOW MANY DAYS PER WEEK DO YOU ENGAGE IN MODERATE TO STRENUOUS EXERCISE (LIKE A BRISK WALK)?: 7 DAYS

## 2025-06-17 SDOH — ECONOMIC STABILITY: INCOME INSECURITY: IN THE LAST 12 MONTHS, WAS THERE A TIME WHEN YOU WERE NOT ABLE TO PAY THE MORTGAGE OR RENT ON TIME?: NO

## 2025-06-17 SDOH — ECONOMIC STABILITY: TRANSPORTATION INSECURITY
IN THE PAST 12 MONTHS, HAS THE LACK OF TRANSPORTATION KEPT YOU FROM MEDICAL APPOINTMENTS OR FROM GETTING MEDICATIONS?: NO

## 2025-06-17 SDOH — HEALTH STABILITY: PHYSICAL HEALTH: ON AVERAGE, HOW MANY MINUTES DO YOU ENGAGE IN EXERCISE AT THIS LEVEL?: 70 MIN

## 2025-06-17 SDOH — ECONOMIC STABILITY: FOOD INSECURITY: WITHIN THE PAST 12 MONTHS, YOU WORRIED THAT YOUR FOOD WOULD RUN OUT BEFORE YOU GOT MONEY TO BUY MORE.: NEVER TRUE

## 2025-06-17 SDOH — ECONOMIC STABILITY: TRANSPORTATION INSECURITY
IN THE PAST 12 MONTHS, HAS LACK OF TRANSPORTATION KEPT YOU FROM MEETINGS, WORK, OR FROM GETTING THINGS NEEDED FOR DAILY LIVING?: NO

## 2025-06-17 ASSESSMENT — LIFESTYLE VARIABLES
HOW MANY STANDARD DRINKS CONTAINING ALCOHOL DO YOU HAVE ON A TYPICAL DAY: 1 OR 2
HOW MANY STANDARD DRINKS CONTAINING ALCOHOL DO YOU HAVE ON A TYPICAL DAY: 1
HOW OFTEN DO YOU HAVE A DRINK CONTAINING ALCOHOL: 2-4 TIMES A MONTH
HOW OFTEN DO YOU HAVE SIX OR MORE DRINKS ON ONE OCCASION: 1
HOW OFTEN DO YOU HAVE A DRINK CONTAINING ALCOHOL: 3

## 2025-06-17 ASSESSMENT — PATIENT HEALTH QUESTIONNAIRE - PHQ9
SUM OF ALL RESPONSES TO PHQ QUESTIONS 1-9: 0
1. LITTLE INTEREST OR PLEASURE IN DOING THINGS: NOT AT ALL
2. FEELING DOWN, DEPRESSED OR HOPELESS: NOT AT ALL

## 2025-06-20 ENCOUNTER — OFFICE VISIT (OUTPATIENT)
Age: 85
End: 2025-06-20
Payer: MEDICARE

## 2025-06-20 VITALS
WEIGHT: 175 LBS | HEIGHT: 68 IN | HEART RATE: 73 BPM | OXYGEN SATURATION: 98 % | TEMPERATURE: 97.4 F | RESPIRATION RATE: 17 BRPM | BODY MASS INDEX: 26.52 KG/M2 | DIASTOLIC BLOOD PRESSURE: 63 MMHG | SYSTOLIC BLOOD PRESSURE: 145 MMHG

## 2025-06-20 DIAGNOSIS — Z00.00 MEDICARE ANNUAL WELLNESS VISIT, SUBSEQUENT: Primary | ICD-10-CM

## 2025-06-20 DIAGNOSIS — M25.561 CHRONIC PAIN OF RIGHT KNEE: ICD-10-CM

## 2025-06-20 DIAGNOSIS — R79.9 ABNORMAL FINDING OF BLOOD CHEMISTRY, UNSPECIFIED: ICD-10-CM

## 2025-06-20 DIAGNOSIS — I10 PRIMARY HYPERTENSION: ICD-10-CM

## 2025-06-20 DIAGNOSIS — G89.29 CHRONIC PAIN OF RIGHT KNEE: ICD-10-CM

## 2025-06-20 DIAGNOSIS — N18.31 STAGE 3A CHRONIC KIDNEY DISEASE (HCC): ICD-10-CM

## 2025-06-20 LAB
APPEARANCE UR: CLEAR
BILIRUB UR QL: NEGATIVE
COLOR UR: NORMAL
GLUCOSE UR STRIP.AUTO-MCNC: NEGATIVE MG/DL
HGB UR QL STRIP: NEGATIVE
KETONES UR QL STRIP.AUTO: NEGATIVE MG/DL
LEUKOCYTE ESTERASE UR QL STRIP.AUTO: NEGATIVE
NITRITE UR QL STRIP.AUTO: NEGATIVE
PH UR STRIP: 6 (ref 5–8)
PROT UR STRIP-MCNC: NEGATIVE MG/DL
SP GR UR REFRACTOMETRY: 1.02 (ref 1–1.03)
SPECIMEN HOLD: NORMAL
UROBILINOGEN UR QL STRIP.AUTO: 0.2 EU/DL (ref 0.2–1)

## 2025-06-20 PROCEDURE — 1123F ACP DISCUSS/DSCN MKR DOCD: CPT | Performed by: FAMILY MEDICINE

## 2025-06-20 PROCEDURE — G0439 PPPS, SUBSEQ VISIT: HCPCS | Performed by: FAMILY MEDICINE

## 2025-06-20 PROCEDURE — 1125F AMNT PAIN NOTED PAIN PRSNT: CPT | Performed by: FAMILY MEDICINE

## 2025-06-20 RX ORDER — LOSARTAN POTASSIUM AND HYDROCHLOROTHIAZIDE 12.5; 1 MG/1; MG/1
1 TABLET ORAL DAILY
Qty: 90 TABLET | Refills: 1 | Status: SHIPPED | OUTPATIENT
Start: 2025-06-20

## 2025-06-20 NOTE — PROGRESS NOTES
Chief Complaint   Patient presents with    Medicare AWV       \"Have you been to the ER, urgent care clinic since your last visit?  Hospitalized since your last visit?\"    NO    “Have you seen or consulted any other health care providers outside of [facility] since your last visit?”    NO      Click Here for Release of Records Request     No results found for this visit on 25.   Vitals:    25 0941   BP: (!) 165/69   BP Site: Left Upper Arm   Patient Position: Sitting   BP Cuff Size: Large Adult   Pulse: 73   Resp: 17   Temp: 97.4 °F (36.3 °C)   TempSrc: Temporal   SpO2: 98%   Weight: 79.4 kg (175 lb)   Height: 1.727 m (5' 8\")          The patient, Swapnil RYAN Dakota Andino, identity was verified by name and .

## 2025-06-21 LAB
ALBUMIN SERPL-MCNC: 3.9 G/DL (ref 3.5–5)
ALBUMIN/GLOB SERPL: 1.3 (ref 1.1–2.2)
ALP SERPL-CCNC: 57 U/L (ref 45–117)
ALT SERPL-CCNC: 24 U/L (ref 12–78)
ANION GAP SERPL CALC-SCNC: 5 MMOL/L (ref 2–12)
AST SERPL-CCNC: 23 U/L (ref 15–37)
BASOPHILS # BLD: 0.07 K/UL (ref 0–0.1)
BASOPHILS NFR BLD: 1 % (ref 0–1)
BILIRUB SERPL-MCNC: 1 MG/DL (ref 0.2–1)
BUN SERPL-MCNC: 23 MG/DL (ref 6–20)
BUN/CREAT SERPL: 18 (ref 12–20)
CALCIUM SERPL-MCNC: 9.4 MG/DL (ref 8.5–10.1)
CHLORIDE SERPL-SCNC: 108 MMOL/L (ref 97–108)
CHOLEST SERPL-MCNC: 135 MG/DL
CO2 SERPL-SCNC: 29 MMOL/L (ref 21–32)
CREAT SERPL-MCNC: 1.3 MG/DL (ref 0.7–1.3)
DIFFERENTIAL METHOD BLD: ABNORMAL
EOSINOPHIL # BLD: 0.26 K/UL (ref 0–0.4)
EOSINOPHIL NFR BLD: 3.8 % (ref 0–7)
ERYTHROCYTE [DISTWIDTH] IN BLOOD BY AUTOMATED COUNT: 13.2 % (ref 11.5–14.5)
EST. AVERAGE GLUCOSE BLD GHB EST-MCNC: 108 MG/DL
GLOBULIN SER CALC-MCNC: 3 G/DL (ref 2–4)
GLUCOSE SERPL-MCNC: 96 MG/DL (ref 65–100)
HBA1C MFR BLD: 5.4 % (ref 4–5.6)
HCT VFR BLD AUTO: 39.6 % (ref 36.6–50.3)
HDLC SERPL-MCNC: 69 MG/DL
HDLC SERPL: 2 (ref 0–5)
HGB BLD-MCNC: 13.1 G/DL (ref 12.1–17)
IMM GRANULOCYTES # BLD AUTO: 0.02 K/UL (ref 0–0.04)
IMM GRANULOCYTES NFR BLD AUTO: 0.3 % (ref 0–0.5)
LDLC SERPL CALC-MCNC: 58.6 MG/DL (ref 0–100)
LYMPHOCYTES # BLD: 1.88 K/UL (ref 0.8–3.5)
LYMPHOCYTES NFR BLD: 27.2 % (ref 12–49)
MCH RBC QN AUTO: 32.4 PG (ref 26–34)
MCHC RBC AUTO-ENTMCNC: 33.1 G/DL (ref 30–36.5)
MCV RBC AUTO: 98 FL (ref 80–99)
MONOCYTES # BLD: 0.86 K/UL (ref 0–1)
MONOCYTES NFR BLD: 12.4 % (ref 5–13)
NEUTS SEG # BLD: 3.83 K/UL (ref 1.8–8)
NEUTS SEG NFR BLD: 55.3 % (ref 32–75)
NRBC # BLD: 0 K/UL (ref 0–0.01)
NRBC BLD-RTO: 0 PER 100 WBC
PLATELET # BLD AUTO: 148 K/UL (ref 150–400)
PMV BLD AUTO: 12.9 FL (ref 8.9–12.9)
POTASSIUM SERPL-SCNC: 3.7 MMOL/L (ref 3.5–5.1)
PROT SERPL-MCNC: 6.9 G/DL (ref 6.4–8.2)
RBC # BLD AUTO: 4.04 M/UL (ref 4.1–5.7)
SODIUM SERPL-SCNC: 142 MMOL/L (ref 136–145)
T4 FREE SERPL-MCNC: 0.9 NG/DL (ref 0.8–1.5)
TRIGL SERPL-MCNC: 37 MG/DL
TSH SERPL DL<=0.05 MIU/L-ACNC: 1.1 UIU/ML (ref 0.36–3.74)
VLDLC SERPL CALC-MCNC: 7.4 MG/DL
WBC # BLD AUTO: 6.9 K/UL (ref 4.1–11.1)

## 2025-06-27 ENCOUNTER — RESULTS FOLLOW-UP (OUTPATIENT)
Age: 85
End: 2025-06-27

## 2025-06-27 ENCOUNTER — HOSPITAL ENCOUNTER (OUTPATIENT)
Facility: HOSPITAL | Age: 85
Discharge: HOME | End: 2025-06-30
Payer: MEDICARE

## 2025-06-27 DIAGNOSIS — G89.29 CHRONIC PAIN OF RIGHT KNEE: ICD-10-CM

## 2025-06-27 DIAGNOSIS — I10 PRIMARY HYPERTENSION: ICD-10-CM

## 2025-06-27 DIAGNOSIS — M25.561 CHRONIC PAIN OF RIGHT KNEE: ICD-10-CM

## 2025-06-27 PROCEDURE — 73562 X-RAY EXAM OF KNEE 3: CPT

## 2025-06-27 NOTE — PATIENT INSTRUCTIONS

## 2025-06-27 NOTE — PROGRESS NOTES
Medicare Annual Wellness Visit    Swapnil Duncan Sr is here for Medicare AWV    Assessment & Plan   Medicare annual wellness visit, subsequent  Stage 3a chronic kidney disease (HCC)  -     Lipid Panel; Future  -     Hemoglobin A1C; Future  -     CBC with Auto Differential; Future  -     TSH + Free T4 Panel; Future  -     Comprehensive Metabolic Panel; Future  -     Urinalysis; Future  Chronic pain of right knee  -     XR KNEE RIGHT (3 VIEWS); Future  Primary hypertension  -     XR KNEE RIGHT (3 VIEWS); Future  Abnormal finding of blood chemistry, unspecified  -     Hemoglobin A1C; Future       Return if symptoms worsen or fail to improve.     Subjective       Patient's complete Health Risk Assessment and screening values have been reviewed and are found in Flowsheets. The following problems were reviewed today and where indicated follow up appointments were made and/or referrals ordered.    Positive Risk Factor Screenings with Interventions:                 Dentist Screen:  Have you seen the dentist within the past year?: (!) (Patient-Rptd) No    Intervention:  Advised to schedule with their dentist            ROS:  Constitutional: no chills and fever,nad     HENT: no ear pain and nosebleeds. No blurred vision,   Respiratory: no shortness of breath, wheezing cough nor sore throat.    Cardiovascular: Has no chest pain, leg swelling ,and racing heart .   Gastrointestinal: No constipation, diarrhea, nausea and vomiting.   Genitourinary: No frequency.   Musculoskeletal: +++for multiple joint pain, no swelling   Skin: no itching,or pimples   Neurological: Negative for dizziness, having no tremors  Psychiatric/Behavioral: Negative for depression,  not nervous/anxious.                   Objective   Vitals:    06/20/25 0941 06/20/25 1014   BP: (!) 165/69 (!) 145/63   BP Site: Left Upper Arm    Patient Position: Sitting    BP Cuff Size: Large Adult    Pulse: 73    Resp: 17    Temp: 97.4 °F (36.3 °C)    TempSrc: Temporal

## 2025-08-15 ENCOUNTER — OFFICE VISIT (OUTPATIENT)
Age: 85
End: 2025-08-15
Payer: MEDICARE

## 2025-08-15 VITALS
TEMPERATURE: 98 F | WEIGHT: 177 LBS | RESPIRATION RATE: 17 BRPM | OXYGEN SATURATION: 98 % | SYSTOLIC BLOOD PRESSURE: 116 MMHG | HEIGHT: 68 IN | DIASTOLIC BLOOD PRESSURE: 72 MMHG | HEART RATE: 66 BPM | BODY MASS INDEX: 26.83 KG/M2

## 2025-08-15 DIAGNOSIS — M25.561 CHRONIC PAIN OF RIGHT KNEE: ICD-10-CM

## 2025-08-15 DIAGNOSIS — N52.03 COMBINED ARTERIAL INSUFFICIENCY AND CORPORO-VENOUS OCCLUSIVE ERECTILE DYSFUNCTION: Primary | ICD-10-CM

## 2025-08-15 DIAGNOSIS — G89.29 CHRONIC PAIN OF RIGHT KNEE: ICD-10-CM

## 2025-08-15 DIAGNOSIS — L20.82 FLEXURAL ECZEMA: ICD-10-CM

## 2025-08-15 PROCEDURE — 99213 OFFICE O/P EST LOW 20 MIN: CPT | Performed by: FAMILY MEDICINE

## 2025-08-15 PROCEDURE — 1159F MED LIST DOCD IN RCRD: CPT | Performed by: FAMILY MEDICINE

## 2025-08-15 PROCEDURE — 1125F AMNT PAIN NOTED PAIN PRSNT: CPT | Performed by: FAMILY MEDICINE

## 2025-08-15 PROCEDURE — 1036F TOBACCO NON-USER: CPT | Performed by: FAMILY MEDICINE

## 2025-08-15 PROCEDURE — 1123F ACP DISCUSS/DSCN MKR DOCD: CPT | Performed by: FAMILY MEDICINE

## 2025-08-15 PROCEDURE — G8419 CALC BMI OUT NRM PARAM NOF/U: HCPCS | Performed by: FAMILY MEDICINE

## 2025-08-15 PROCEDURE — G8427 DOCREV CUR MEDS BY ELIG CLIN: HCPCS | Performed by: FAMILY MEDICINE

## 2025-08-15 RX ORDER — TADALAFIL 20 MG/1
20 TABLET ORAL DAILY PRN
Qty: 90 TABLET | Refills: 3 | Status: SHIPPED | OUTPATIENT
Start: 2025-08-15

## 2025-08-15 RX ORDER — DICLOFENAC SODIUM 75 MG/1
75 TABLET, DELAYED RELEASE ORAL 2 TIMES DAILY PRN
Qty: 60 TABLET | Refills: 2 | Status: SHIPPED | OUTPATIENT
Start: 2025-08-15

## 2025-08-15 RX ORDER — TRIAMCINOLONE ACETONIDE 1 MG/G
CREAM TOPICAL
Qty: 45 G | Refills: 3 | Status: SHIPPED | OUTPATIENT
Start: 2025-08-15

## 2025-08-15 RX ORDER — PREDNISONE 20 MG/1
20 TABLET ORAL DAILY
Qty: 7 TABLET | Refills: 0 | Status: SHIPPED | OUTPATIENT
Start: 2025-08-15 | End: 2025-08-22

## 2025-08-15 ASSESSMENT — PATIENT HEALTH QUESTIONNAIRE - PHQ9
SUM OF ALL RESPONSES TO PHQ QUESTIONS 1-9: 0
1. LITTLE INTEREST OR PLEASURE IN DOING THINGS: NOT AT ALL
2. FEELING DOWN, DEPRESSED OR HOPELESS: NOT AT ALL
SUM OF ALL RESPONSES TO PHQ QUESTIONS 1-9: 0

## (undated) DEVICE — ELECTRODE,RADIOTRANSLUCENT,FOAM,5PK: Brand: MEDLINE

## (undated) DEVICE — Z DISCONTINUED PER MEDLINE LINE GAS SAMPLING O2/CO2 LNG AD 13 FT NSL W/ TBNG FILTERLINE

## (undated) DEVICE — 1200 GUARD II KIT W/5MM TUBE W/O VAC TUBE: Brand: GUARDIAN

## (undated) DEVICE — SYR 10ML LUER LOK 1/5ML GRAD --

## (undated) DEVICE — BLOCK BITE ENDOSCP AD 21 MM W/ DIL BLU LF DISP

## (undated) DEVICE — Device

## (undated) DEVICE — SET ADMIN 16ML TBNG L100IN 2 Y INJ SITE IV PIGGY BK DISP

## (undated) DEVICE — NEONATAL-ADULT SPO2 SENSOR: Brand: NELLCOR

## (undated) DEVICE — SOLIDIFIER MEDC 1200ML -- CONVERT TO 356117

## (undated) DEVICE — TOWEL 4 PLY TISS 19X30 SUE WHT

## (undated) DEVICE — SYR ASSEMB INFL BLLN 60ML --

## (undated) DEVICE — FORCEPS BX L160CM DIA8MM GRSP DISECT CUP TIP NONLOCKING ROT

## (undated) DEVICE — NEEDLE HYPO 18GA L1.5IN PNK S STL HUB POLYPR SHLD REG BVL

## (undated) DEVICE — CATH IV AUTOGRD BC PNK 20GA 25 -- INSYTE

## (undated) DEVICE — SYR 3ML LL TIP 1/10ML GRAD --

## (undated) DEVICE — CONTAINER SPEC 20 ML LID NEUT BUFF FORMALIN 10 % POLYPR STS

## (undated) DEVICE — BASIN EMSIS 16OZ GRAPHITE PLAS KID SHP MOLD GRAD FOR ORAL

## (undated) DEVICE — YANKAUER,TAPERED BULBOUS TIP,W/O VENT: Brand: MEDLINE

## (undated) DEVICE — BAG SPEC BIOHZRD 10 X 10 IN --